# Patient Record
Sex: FEMALE | Race: BLACK OR AFRICAN AMERICAN | Employment: OTHER | RURAL
[De-identification: names, ages, dates, MRNs, and addresses within clinical notes are randomized per-mention and may not be internally consistent; named-entity substitution may affect disease eponyms.]

---

## 2017-03-15 ENCOUNTER — OFFICE VISIT (OUTPATIENT)
Dept: FAMILY MEDICINE CLINIC | Age: 82
End: 2017-03-15

## 2017-03-15 VITALS
WEIGHT: 202 LBS | HEIGHT: 65 IN | OXYGEN SATURATION: 95 % | HEART RATE: 66 BPM | RESPIRATION RATE: 18 BRPM | DIASTOLIC BLOOD PRESSURE: 60 MMHG | SYSTOLIC BLOOD PRESSURE: 137 MMHG | BODY MASS INDEX: 33.66 KG/M2 | TEMPERATURE: 96.7 F

## 2017-03-15 DIAGNOSIS — Z00.00 ROUTINE GENERAL MEDICAL EXAMINATION AT A HEALTH CARE FACILITY: Primary | ICD-10-CM

## 2017-03-15 DIAGNOSIS — Z86.73 HISTORY OF STROKE: ICD-10-CM

## 2017-03-15 DIAGNOSIS — I63.319 CEREBROVASCULAR ACCIDENT (CVA) DUE TO THROMBOSIS OF MIDDLE CEREBRAL ARTERY, UNSPECIFIED BLOOD VESSEL LATERALITY (HCC): ICD-10-CM

## 2017-03-15 DIAGNOSIS — Z13.39 SCREENING FOR ALCOHOLISM: ICD-10-CM

## 2017-03-15 DIAGNOSIS — N18.2 CKD (CHRONIC KIDNEY DISEASE), STAGE 2 (MILD): ICD-10-CM

## 2017-03-15 DIAGNOSIS — I10 ESSENTIAL HYPERTENSION: ICD-10-CM

## 2017-03-15 DIAGNOSIS — Z23 ENCOUNTER FOR IMMUNIZATION: ICD-10-CM

## 2017-03-15 DIAGNOSIS — Z13.31 SCREENING FOR DEPRESSION: ICD-10-CM

## 2017-03-15 DIAGNOSIS — E78.00 PURE HYPERCHOLESTEROLEMIA: ICD-10-CM

## 2017-03-15 DIAGNOSIS — K21.9 GASTROESOPHAGEAL REFLUX DISEASE WITHOUT ESOPHAGITIS: ICD-10-CM

## 2017-03-15 DIAGNOSIS — N32.81 OVERACTIVE BLADDER: ICD-10-CM

## 2017-03-15 RX ORDER — ATORVASTATIN CALCIUM 10 MG/1
TABLET, FILM COATED ORAL
Qty: 90 TAB | Refills: 3 | Status: SHIPPED | OUTPATIENT
Start: 2017-03-15 | End: 2018-05-05 | Stop reason: SDUPTHER

## 2017-03-15 RX ORDER — PHENOL/SODIUM PHENOLATE
20 AEROSOL, SPRAY (ML) MUCOUS MEMBRANE DAILY
Qty: 90 TAB | Refills: 3 | Status: SHIPPED | OUTPATIENT
Start: 2017-03-15 | End: 2017-06-01 | Stop reason: SDUPTHER

## 2017-03-15 RX ORDER — OXYBUTYNIN CHLORIDE 5 MG/1
TABLET ORAL
Qty: 180 TAB | Refills: 3 | Status: SHIPPED | OUTPATIENT
Start: 2017-03-15 | End: 2018-03-31 | Stop reason: SDUPTHER

## 2017-03-15 NOTE — PROGRESS NOTES
Corinne Poling is a 80 y.o. female presenting for/with: Annual Wellness Visit    HPI:  F/U TIA/CVA  Remains on ASA 81mg every day, eliquis 5 BID, judd well, no bleeding problems, BP pills and statin, judd all well. Orthostasis sx have been quiescent since last visit. No syncope, no falls, no vision change during episodes. CTA 10/2015 showed minimal atherosclerosis. No facial droop, no change in hand clumsiness (R hand a little weaker than left after last stroke)    Hypertension. Blood pressures have been great since last visit. Management at last visit included con't current meds, lisinopril@ 30mg every day due to orthostasis sx/ dizziness on the 40mg. Current regimen: ACE inhibitor and calcium channel blocker. Symptoms include no symptoms. Patient denies chest pain, palpitations, headache. Lab review:   Lab Results   Component Value Date/Time    Sodium 144 12/07/2016 02:05 PM    Potassium 4.2 12/07/2016 02:05 PM    Chloride 104 12/07/2016 02:05 PM    CO2 27 12/07/2016 02:05 PM    Anion gap 5 10/24/2015 03:21 AM    Glucose 106 12/07/2016 02:05 PM    BUN 15 12/07/2016 02:05 PM    Creatinine 1.07 12/07/2016 02:05 PM    BUN/Creatinine ratio 14 12/07/2016 02:05 PM    GFR est AA 55 12/07/2016 02:05 PM    GFR est non-AA 48 12/07/2016 02:05 PM    Calcium 9.3 12/07/2016 02:05 PM     Hyperlipidemia. On lipitor 40. Judd well. No myalgias, arthralgias, unusual weakness. Lab Results   Component Value Date/Time    Cholesterol, total 123 05/04/2016 02:32 PM    HDL Cholesterol 81 05/04/2016 02:32 PM    LDL, calculated 16 05/04/2016 02:32 PM    VLDL, calculated 26 05/04/2016 02:32 PM    Triglyceride 131 05/04/2016 02:32 PM    CHOL/HDL Ratio 1.4 10/23/2015 03:06 AM     Lab Results   Component Value Date/Time    ALT (SGPT) 8 05/04/2016 02:32 PM    AST (SGOT) 15 05/04/2016 02:32 PM    Alk.  phosphatase 85 05/04/2016 02:32 PM    Bilirubin, total 0.3 05/04/2016 02:32 PM     PMH, SH, Medications/Allergies: reviewed, on chart.    ROS:  Constitutional: No fever, chills or weight loss  Respiratory: No cough, SOB   CV: No chest pain or Palpitations    Visit Vitals    /60 (BP 1 Location: Right arm, BP Patient Position: Sitting)    Pulse 66    Temp 96.7 °F (35.9 °C) (Oral)    Resp 18    Ht 5' 5\" (1.651 m)    Wt 202 lb (91.6 kg)    SpO2 95%    BMI 33.61 kg/m2     Wt Readings from Last 3 Encounters:   03/15/17 202 lb (91.6 kg)   12/07/16 203 lb (92.1 kg)   11/02/16 202 lb (91.6 kg)     BP Readings from Last 3 Encounters:   03/15/17 137/60   12/07/16 152/78   11/02/16 130/60     Physical Examination: General appearance - alert, well appearing, and in no distress  Mental status - alert, oriented to person, place, and time  Eyes - pupils equal and reactive, extraocular eye movements intact  ENT - bilateral external ears and nose normal. Normal lips  Neck - supple, no significant adenopathy, no thyromegaly or mass  Lymphatics - no palpable lymphadenopathy, no hepatosplenomegaly  Chest - clear to auscultation, no wheezes, rales or rhonchi, symmetric air entry  Heart - normal rate, regular rhythm, normal S1, S2, no murmurs, rubs, clicks or gallops  Abd - NABS. Soft. Mild epigastric ttp, no G/R/G, no HSM/Mass. Extremities - peripheral pulses normal, no pedal edema, no clubbing or cyanosis  Neuro- CN 2-12 intact to test, nl coordination and ROM UE and LE. Nl gait. Str UE and LE 5/5. Gait normal.  Celia hallpike negative bilat. A/P:  TIA   Doing well. Con't eliquis, statin, ASA 81mg. Since had breakthru TIA on ASA and plavix, plan con't ASA and eliquis together indefinitely, provided pt has no bleeding problems. HTN and CKD  In goal today. Con't lisinopril 30mg every day, norvasc at half of a 10mg  (5mg dose) every day. Check labs in [place]. HLD  well controlled. con't current tx.  Plan labs in [place].    F/U 3mo/PRN    ______________________________________________________________________    Tucker Eddy is a 80 y.o. female and presents for annual Medicare Wellness Visit. Problem List: Reviewed with patient and discussed risk factors. Patient Active Problem List   Diagnosis Code    Carotid stenosis I65.29    CKD (chronic kidney disease) N18.9    GERD (gastroesophageal reflux disease) K21.9    Hyperlipidemia E78.5    HTN (hypertension) I10    History of stroke Z86.73    Knee pain, right M25.569    TIA (transient ischemic attack) G45.9       Current medical providers:  Patient Care Team:  Nona Ortiz MD as PCP - General (Family Practice)    PMH, , Medications/Allergies: reviewed, on chart. Female Alcohol Screening: On any occasion during the past 3 months, have you had more than 3 drinks containing alcohol? No    Do you average more than 7 drinks per week? No    ROS:  Constitutional: No fever, chills or weight loss  Respiratory: No cough, SOB   CV: No chest pain or Palpitations    Objective:  Visit Vitals    /60 (BP 1 Location: Right arm, BP Patient Position: Sitting)    Pulse 66    Temp 96.7 °F (35.9 °C) (Oral)    Resp 18    Ht 5' 5\" (1.651 m)    Wt 202 lb (91.6 kg)    SpO2 95%    BMI 33.61 kg/m2    Body mass index is 33.61 kg/(m^2). Assessment of cognitive impairment: Alert and oriented x 3    Depression Screen:   PHQ 2 / 9, over the last two weeks 3/15/2017   Little interest or pleasure in doing things Not at all   Feeling down, depressed or hopeless Not at all   Total Score PHQ 2 0       Fall Risk Assessment:    Fall Risk Assessment, last 12 mths 3/15/2017   Able to walk? Yes   Fall in past 12 months? No     Functional Ability:   Does the patient exhibit a steady gait? yes   How long did it take the patient to get up and walk from a sitting position? 3s   Is the patient self reliant?  (ie can do own laundry, meals, household chores)  yes     Does the patient handle his/her own medications? yes     Does the patient handle his/her own money?    yes     Is the patients home safe (ie good lighting, handrails on stairs and bath, etc.)? yes     Did you notice or did patient express any hearing difficulties? no     Did you notice or did patient express any vision difficulties? no       Advance Care Planning:   Patient was offered the opportunity to discuss advance care planning:  yes     Does patient have an Advance Directive:  yes   If no, did you provide information on Caring Connections? NA       Plan:      Orders Placed This Encounter    Depression Screen Annual    atorvastatin (LIPITOR) 10 mg tablet    Omeprazole delayed release (PRILOSEC D/R) 20 mg tablet    oxybutynin (DITROPAN) 5 mg tablet    varicella zoster vacine live (ZOSTAVAX) 19,400 unit/0.65 mL susr injection       Health Maintenance   Topic Date Due    DTaP/Tdap/Td series (1 - Tdap) 11/30/1954    ZOSTER VACCINE AGE 60>  11/30/1993    GLAUCOMA SCREENING Q2Y  11/30/1998    OSTEOPOROSIS SCREENING (DEXA)  11/30/1998    MEDICARE YEARLY EXAM  01/07/2017    INFLUENZA AGE 9 TO ADULT  09/11/2017 (Originally 8/1/2016)    Pneumococcal 65+ Low/Medium Risk (2 of 2 - PPSV23) 05/04/2017    BREAST CANCER SCRN MAMMOGRAM  01/13/2018       *Patient verbalized understanding and agreement with the plan. A copy of the After Visit Summary with personalized health plan was given to the patient today.

## 2017-03-15 NOTE — MR AVS SNAPSHOT
Visit Information Date & Time Provider Department Dept. Phone Encounter #  
 3/15/2017  1:00 PM Bharti Wiley MD [address] [place] [address] [id number] Follow-up Instructions Return in about 3 months (around 6/15/2017). Follow-up and Disposition History Your Appointments 6/1/2017  1:20 PM  
ESTABLISHED PATIENT with Bharti Wiley MD  
[address] ([facility]) Appt Note: 3 MO F/U  
 [address] [facility] [address] [facility],5Th Floor [address] [phone]  
  
   
 [address] [facility] [address] [place], [place] [address] [facility],5Th Floor [address] Upcoming Health Maintenance Date Due DTaP/Tdap/Td series (1 - Tdap) 11/30/1954 ZOSTER VACCINE AGE 60> 11/30/1993 GLAUCOMA SCREENING Q2Y 11/30/1998 OSTEOPOROSIS SCREENING (DEXA) 11/30/1998 MEDICARE YEARLY EXAM 1/7/2017 INFLUENZA AGE 9 TO ADULT 9/11/2017* Pneumococcal 65+ Low/Medium Risk (2 of 2 - PPSV23) 5/4/2017 BREAST CANCER SCRN MAMMOGRAM 1/13/2018 *Topic was postponed. The date shown is not the original due date. Allergies as of 3/15/2017  Review Complete On: 3/15/2017 By: Kenia Crockett LPN Severity Noted Reaction Type Reactions Ciprofloxacin  09/04/2013    Rash Current Immunizations  Never Reviewed Name Date Influenza Vaccine 10/13/2015 Pneumococcal Conjugate (PCV-13) 5/4/2016 Not reviewed this visit You Were Diagnosed With   
  
 Codes Comments Routine general medical examination at a health care facility    -  Primary ICD-10-CM: Z00.00 ICD-9-CM: V70.0 Screening for alcoholism     ICD-10-CM: Z13.89 ICD-9-CM: V79.1 Screening for depression     ICD-10-CM: Z13.89 ICD-9-CM: V79.0 History of stroke     ICD-10-CM: Z86.73 
ICD-9-CM: V12.54 Essential hypertension     ICD-10-CM: I10 
ICD-9-CM: 401.9 CKD (chronic kidney disease), stage 2 (mild)     ICD-10-CM: N18.2 ICD-9-CM: 876. 2  Pure hypercholesterolemia     ICD-10-CM: E78.00 
 ICD-9-CM: 272.0 Cerebrovascular accident (CVA) due to thrombosis of middle cerebral artery, unspecified blood vessel laterality ([facility] [address]     ICD-10-CM: P63.341 ICD-9-CM: 434.01 Gastroesophageal reflux disease without esophagitis     ICD-10-CM: K21.9 ICD-9-CM: 530.81 Overactive bladder     ICD-10-CM: N32.81 ICD-9-CM: 596.51 Encounter for immunization     ICD-10-CM: B44 ICD-9-CM: V03.89 Vitals BP Pulse Temp Resp Height(growth percentile) Weight(growth percentile)  
 137/60 (BP 1 Location: Right arm, BP Patient Position: Sitting) 66 96.7 °F (35.9 °C) (Oral) 18 5' 5\" (1.651 m) 202 lb (91.6 kg) SpO2 BMI OB Status Smoking Status 95% 33.61 kg/m2 Menopause Never Smoker BMI and BSA Data Body Mass Index Body Surface Area  
 33.61 kg/m 2 2.05 m 2 Preferred Pharmacy Pharmacy Name [facility], [place] - [address] [phone] Your Updated Medication List  
  
   
This list is accurate as of: 3/15/17  1:29 PM.  Always use your most recent med list. amLODIPine 10 mg tablet Commonly known as:  Bronson Presser Take 0.5 Tabs by mouth daily. aspirin delayed-release 81 mg tablet Take  by mouth daily. atorvastatin 10 mg tablet Commonly known as:  LIPITOR  
TAKE ONE TABLET BY MOUTH ONCE DAILY FOR stroke and cholesterol ELIQUIS 5 mg tablet Generic drug:  apixaban TAKE ONE TABLET BY MOUTH TWICE DAILY  
  
 lisinopril 30 mg tablet Commonly known as:  Ashley Pares Take 1 Tab by mouth daily. Indications: pressure Omeprazole delayed release 20 mg tablet Commonly known as:  PRILOSEC D/R Take 1 Tab by mouth daily. Indications: HEARTBURN  
  
 oxybutynin 5 mg tablet Commonly known as:  DITROPAN  
TAKE ONE TABLET BY MOUTH TWICE DAILY FOR  BLADDER  HYPERACTIVITY  
  
 varicella zoster vacine live 19,400 unit/0.65 mL Susr injection Commonly known as:  ZOSTAVAX 1 Vial by SubCUTAneous route once for 1 dose. VITAMIN D3 1,000 unit Cap Generic drug:  cholecalciferol Take  by mouth. Prescriptions Printed Refills  
 varicella zoster vacine live (ZOSTAVAX) 19,400 unit/0.65 mL susr injection 0 Si Vial by SubCUTAneous route once for 1 dose. Class: Print Route: SubCUTAneous Prescriptions Sent to Pharmacy Refills  
 atorvastatin (LIPITOR) 10 mg tablet 3 Sig: TAKE ONE TABLET BY MOUTH ONCE DAILY FOR stroke and cholesterol Class: Normal  
 Pharmacy: [address] [facility] Ph #: [phone] Omeprazole delayed release (PRILOSEC D/R) 20 mg tablet 3 Sig: Take 1 Tab by mouth daily. Indications: HEARTBURN Class: Normal  
 Pharmacy: [id number] [facility] [facility] Ph #: [phone] Route: Oral  
 oxybutynin (DITROPAN) 5 mg tablet 3 Sig: TAKE ONE TABLET BY MOUTH TWICE DAILY FOR  BLADDER  HYPERACTIVITY Class: Normal  
 Pharmacy: [id number] [facility] [facility] Ph #: [phone] We Performed the Following Binta Melendez [[id number] [facility]] Follow-up Instructions Return in about 3 months (around 6/15/2017). Introducing [facility] HE[facility]! Augustus Santos introduces Expreem patient portal. Now you can access parts of your medical record, email your doctor's office, and request medication refills online. 1. In your internet browser, go to [email or web address] 2. Click on the First Time User? Click Here link in the Sign In box. You will see the New Member Sign Up page. 3. Enter your Expreem Access Code exactly as it appears below. You will not need to use this code after youve completed the sign-up process. If you do not sign up before the expiration date, you must request a new code.  
 
· Expreem Access Code: [email or web address] 
 Expires: 6/13/2017 12:30 PM 
 
4. Enter the last four digits of your Social Security Number (xxxx) and Date of Birth (mm/dd/yyyy) as indicated and click Submit. You will be taken to the next sign-up page. 5. Create a [employer] ID. This will be your Neitui login ID and cannot be changed, so think of one that is secure and easy to remember. 6. Create a Neitui password. You can change your password at any time. 7. Enter your Password Reset Question and Answer. This can be used at a later time if you forget your password. 8. Enter your e-mail address. You will receive e-mail notification when new information is available in [address] 9. Click Sign Up. You can now view and download portions of your medical record. 10. Click the Download Summary menu link to download a portable copy of your medical information. If you have questions, please visit the Frequently Asked Questions section of the Neitui website. Remember, Neitui is NOT to be used for urgent needs. For medical emergencies, dial 911. Now available from your iPhone and Android! Please provide this summary of care documentation to your next provider. Your primary care clinician is listed as Nicolette Moroe. If you have any questions after today's visit, please call [phone].

## 2017-06-01 ENCOUNTER — OFFICE VISIT (OUTPATIENT)
Dept: FAMILY MEDICINE CLINIC | Age: 82
End: 2017-06-01

## 2017-06-01 VITALS
RESPIRATION RATE: 18 BRPM | BODY MASS INDEX: 33.49 KG/M2 | SYSTOLIC BLOOD PRESSURE: 134 MMHG | OXYGEN SATURATION: 96 % | HEIGHT: 65 IN | WEIGHT: 201 LBS | HEART RATE: 74 BPM | DIASTOLIC BLOOD PRESSURE: 59 MMHG | TEMPERATURE: 99 F

## 2017-06-01 DIAGNOSIS — K21.9 GASTROESOPHAGEAL REFLUX DISEASE WITHOUT ESOPHAGITIS: ICD-10-CM

## 2017-06-01 DIAGNOSIS — E78.00 PURE HYPERCHOLESTEROLEMIA: ICD-10-CM

## 2017-06-01 DIAGNOSIS — Z86.73 HISTORY OF STROKE: Primary | ICD-10-CM

## 2017-06-01 DIAGNOSIS — I10 ESSENTIAL HYPERTENSION: ICD-10-CM

## 2017-06-01 DIAGNOSIS — Z78.0 POSTMENOPAUSAL: ICD-10-CM

## 2017-06-01 DIAGNOSIS — Z23 ENCOUNTER FOR IMMUNIZATION: ICD-10-CM

## 2017-06-01 RX ORDER — PHENOL/SODIUM PHENOLATE
20 AEROSOL, SPRAY (ML) MUCOUS MEMBRANE DAILY
Qty: 90 TAB | Refills: 3 | Status: SHIPPED | OUTPATIENT
Start: 2017-06-01 | End: 2018-06-08 | Stop reason: SDUPTHER

## 2017-06-01 NOTE — PATIENT INSTRUCTIONS
Pneumococcal Polysaccharide Vaccine: Care Instructions  Your Care Instructions  The pneumococcal polysaccharide vaccine (PPSV) can prevent some of the serious complications of pneumonia. This includes infection in the bloodstream (bacteremia) or throughout the body (septicemia). PPSV is recommended for people ages 72 years and older. People ages 3 to 59 who have a long-term illness should also get the vaccine. This includes people with diabetes, heart disease, liver disease, or lung disease. PPSV can also help people who have a weakened immune system. This includes cancer patients and people who don't have a spleen. The immune system helps your body fight infection and other illnesses. PPSV is given as a shot. It's usually given in the arm. Healthy older adults get the shot once. Other people may need to have a second dose. The shot may cause pain and redness at the site. It may also cause a mild fever for a short time. Follow-up care is a key part of your treatment and safety. Be sure to make and go to all appointments, and call your doctor if you are having problems. It's also a good idea to know your test results and keep a list of the medicines you take. How can you care for yourself at home? · Take an over-the-counter pain medicine, such as acetaminophen (Tylenol), ibuprofen (Advil, Motrin), or naproxen (Aleve), if your arm is sore after the shot. Be safe with medicines. Read and follow all instructions on the label. · Give acetaminophen (Tylenol) or ibuprofen (Advil, Motrin) to your child for pain or fussiness after the shot. Read and follow all instructions on the label. Do not give aspirin to anyone younger than 20. It has been linked to Reye syndrome, a serious illness. · Put ice or a cold pack on the sore area for 10 to 20 minutes at a time. Put a thin cloth between the ice and your skin. When should you call for help? Call 911 anytime you think you may need emergency care.  For example, call if:  · You have severe problems breathing or swallowing. · You have a seizure. Call your doctor now or seek immediate medical care if:  · You get hives. · You have a high fever. · You have any unusual reaction after getting the shot. Watch closely for changes in your health, and be sure to contact your doctor if you have any problems. Where can you learn more? Go to http://naina-david.info/. Enter T225 in the search box to learn more about \"Pneumococcal Polysaccharide Vaccine: Care Instructions. \"  Current as of: February 9, 2016  Content Version: 11.2  © 6232-6590 [employer]. Care instructions adapted under license by [employer] (which disclaims liability or warranty for this information). If you have questions about a medical condition or this instruction, always ask your healthcare professional. [address] any warranty or liability for your use of this information. If you have any questions regarding [employer], you may call [employer] support at [phone].

## 2017-06-01 NOTE — PROGRESS NOTES
Dvein Downs is a 80 y.o. female presenting for/with:    Medication Refill    HPI:  F/U TIA/CVA  Remains on ASA 81mg every day, eliquis 5 BID, judd well, no bleeding problems, BP pills and statin, judd all well. Orthostasis sx have been quiescent since last visit. No syncope, no falls, no vision change during episodes. CTA 10/2015 showed minimal atherosclerosis. No facial droop, no change in hand clumsiness (R hand a little weaker than left after last stroke)    Hypertension. Blood pressures have been great since last visit. Management at last visit included con't current meds, lisinopril@ 30mg every day due to orthostasis sx/ dizziness on the 40mg. Current regimen: ACE inhibitor and calcium channel blocker. Symptoms include no symptoms. Patient denies chest pain, palpitations, headache. Lab review:   Lab Results   Component Value Date/Time    Sodium 144 12/07/2016 02:05 PM    Potassium 4.2 12/07/2016 02:05 PM    Chloride 104 12/07/2016 02:05 PM    CO2 27 12/07/2016 02:05 PM    Anion gap 5 10/24/2015 03:21 AM    Glucose 106 12/07/2016 02:05 PM    BUN 15 12/07/2016 02:05 PM    Creatinine 1.07 12/07/2016 02:05 PM    BUN/Creatinine ratio 14 12/07/2016 02:05 PM    GFR est AA 55 12/07/2016 02:05 PM    GFR est non-AA 48 12/07/2016 02:05 PM    Calcium 9.3 12/07/2016 02:05 PM     Hyperlipidemia. On lipitor 40. Judd well. No myalgias, arthralgias, unusual weakness. Lab Results   Component Value Date/Time    Cholesterol, total 123 05/04/2016 02:32 PM    HDL Cholesterol 81 05/04/2016 02:32 PM    LDL, calculated 16 05/04/2016 02:32 PM    VLDL, calculated 26 05/04/2016 02:32 PM    Triglyceride 131 05/04/2016 02:32 PM    CHOL/HDL Ratio 1.4 10/23/2015 03:06 AM     Lab Results   Component Value Date/Time    ALT (SGPT) 8 05/04/2016 02:32 PM    AST (SGOT) 15 05/04/2016 02:32 PM    Alk.  phosphatase 85 05/04/2016 02:32 PM    Bilirubin, total 0.3 05/04/2016 02:32 PM     PMH, SH, Medications/Allergies: reviewed, on chart.    ROS:  Constitutional: No fever, chills or weight loss  Respiratory: No cough, SOB   CV: No chest pain or Palpitations    Visit Vitals    /59    Pulse 74    Temp 99 °F (37.2 °C) (Oral)    Resp 18    Ht 5' 5\" (1.651 m)    Wt 201 lb (91.2 kg)    SpO2 96%    BMI 33.45 kg/m2     Wt Readings from Last 3 Encounters:   06/01/17 201 lb (91.2 kg)   03/15/17 202 lb (91.6 kg)   12/07/16 203 lb (92.1 kg)     BP Readings from Last 3 Encounters:   06/01/17 134/59   03/15/17 137/60   12/07/16 152/78     Physical Examination: General appearance - alert, well appearing, and in no distress  Mental status - alert, oriented to person, place, and time  Eyes - pupils equal and reactive, extraocular eye movements intact  ENT - bilateral external ears and nose normal. Normal lips  Neck - supple, no significant adenopathy, no thyromegaly or mass  Lymphatics - no palpable lymphadenopathy, no hepatosplenomegaly  Chest - clear to auscultation, no wheezes, rales or rhonchi, symmetric air entry  Heart - normal rate, regular rhythm, normal S1, S2, no murmurs, rubs, clicks or gallops  Extremities - peripheral pulses normal, no pedal edema, no clubbing or cyanosis    A/P:  TIA   Doing well. Con't eliquis, statin, ASA 81mg. Since had breakthru TIA on ASA and plavix, plan con't ASA and eliquis together indefinitely, provided pt has no bleeding problems. HTN and CKD  In goal today. Con't lisinopril 30mg every day, norvasc at half of a 10mg  (5mg dose) every day. Check labs    HLD  well controlled. con't current tx. Check labs    HCM  DXA due  Ordered. Pneumovax 23 due. Ordered.     F/U 3mo/PRN

## 2017-06-01 NOTE — MR AVS SNAPSHOT
Visit Information Date & Time Provider Department Dept. Phone Encounter #  
 6/1/2017  1:20 PM Dante Singer MD [address] [place] [address] [id number] Follow-up Instructions Return in about 6 months (around 12/1/2017). Your Appointments 12/7/2017  1:20 PM  
ESTABLISHED PATIENT with Dante Singer MD  
[facility] ([facility]) Appt Note: 6 MO F/U  
 1000 [facility] [address] [facility] [id number] [phone]  
  
   
 1000 [address] [facility] [address] Upcoming Health Maintenance Date Due  
 GLAUCOMA SCREENING Q2Y 11/30/1998 Pneumococcal 65+ Low/Medium Risk (2 of 2 - PPSV23) 5/4/2017 INFLUENZA AGE 9 TO ADULT 9/11/2017* BREAST CANCER SCRN MAMMOGRAM 1/13/2018 MEDICARE YEARLY EXAM 3/16/2018 DTaP/Tdap/Td series (2 - Td) 6/1/2027 *Topic was postponed. The date shown is not the original due date. Allergies as of 6/1/2017  Review Complete On: 6/1/2017 By: Dante Singer MD  
  
 Severity Noted Reaction Type Reactions Ciprofloxacin  09/04/2013    Rash Current Immunizations  Never Reviewed Name Date Influenza Vaccine 10/13/2015 Pneumococcal Conjugate (PCV-13) 5/4/2016 Pneumococcal Polysaccharide (PPSV-23) 6/1/2017 Not reviewed this visit You Were Diagnosed With   
  
 Codes Comments History of stroke    -  Primary ICD-10-CM: Z86.73 
ICD-9-CM: V12.54 Essential hypertension     ICD-10-CM: I10 
ICD-9-CM: 401.9 Pure hypercholesterolemia     ICD-10-CM: E78.00 ICD-9-CM: 272.0 Gastroesophageal reflux disease without esophagitis     ICD-10-CM: K21.9 ICD-9-CM: 530.81 Postmenopausal     ICD-10-CM: Z78.0 ICD-9-CM: V49.81 Encounter for immunization     ICD-10-CM: T12 ICD-9-CM: V03.89 Vitals BP Pulse Temp Resp Height(growth percentile) Weight(growth percentile) 134/59 74 99 °F (37.2 °C) (Oral) 18 5' 5\" (1.651 m) 201 lb (91.2 kg) SpO2 BMI OB Status Smoking Status 96% 33.45 kg/m2 Menopause Never Smoker BMI and BSA Data Body Mass Index Body Surface Area  
 33.45 kg/m 2 2.05 m 2 Preferred Pharmacy Pharmacy Name Phone [address] [facility] [place] [facility], [place] - [address] [phone] Your Updated Medication List  
  
   
This list is accurate as of: 6/1/17  1:58 PM.  Always use your most recent med list. amLODIPine 10 mg tablet Commonly known as:  Leslie Punter Take 0.5 Tabs by mouth daily. aspirin delayed-release 81 mg tablet Take  by mouth daily. atorvastatin 10 mg tablet Commonly known as:  LIPITOR  
TAKE ONE TABLET BY MOUTH ONCE DAILY FOR stroke and cholesterol ELIQUIS 5 mg tablet Generic drug:  apixaban TAKE ONE TABLET BY MOUTH TWICE DAILY  
  
 lisinopril 30 mg tablet Commonly known as:  Roxianne Daughters Take 1 Tab by mouth daily. Indications: pressure Omeprazole delayed release 20 mg tablet Commonly known as:  PRILOSEC D/R Take 1 Tab by mouth daily. Indications: HEARTBURN  
  
 oxybutynin 5 mg tablet Commonly known as:  DITROPAN  
TAKE ONE TABLET BY MOUTH TWICE DAILY FOR  BLADDER  HYPERACTIVITY  
  
 VITAMIN D3 1,000 unit Cap Generic drug:  cholecalciferol Take  by mouth. Prescriptions Printed Refills Omeprazole delayed release (PRILOSEC D/R) 20 mg tablet 3 Sig: Take 1 Tab by mouth daily. Indications: HEARTBURN Class: Print Route: Oral  
  
We Performed the Following ADMIN PNEUMOCOCCAL VACCINE [ [facility]S] LIPID PANEL [12858 CPT(R)] METABOLIC PANEL, COMPREHENSIVE [42265 CPT(R)] PNEUMOCOCCAL POLYSACCHARIDE VACCINE, 23-VALENT, ADULT OR IMMUNOSUPPRESSED PT DOSE, [66538 CPT(R)] Follow-up Instructions Return in about 6 months (around 12/1/2017). To-Do List   
 06/01/2017 Imaging:  DEXA BONE DENSITY STUDY AXIAL Patient Instructions Pneumococcal Polysaccharide Vaccine: Care Instructions Your Care Instructions The pneumococcal polysaccharide vaccine (PPSV) can prevent some of the serious complications of pneumonia. This includes infection in the bloodstream (bacteremia) or throughout the body (septicemia). PPSV is recommended for people ages 72 years and older. People ages 3 to 59 who have a long-term illness should also get the vaccine. This includes people with diabetes, heart disease, liver disease, or lung disease. PPSV can also help people who have a weakened immune system. This includes cancer patients and people who don't have a spleen. The immune system helps your body fight infection and other illnesses. PPSV is given as a shot. It's usually given in the arm. Healthy older adults get the shot once. Other people may need to have a second dose. The shot may cause pain and redness at the site. It may also cause a mild fever for a short time. Follow-up care is a key part of your treatment and safety. Be sure to make and go to all appointments, and call your doctor if you are having problems. It's also a good idea to know your test results and keep a list of the medicines you take. How can you care for yourself at home? · Take an over-the-counter pain medicine, such as acetaminophen (Tylenol), ibuprofen (Advil, Motrin), or naproxen (Aleve), if your arm is sore after the shot. Be safe with medicines. Read and follow all instructions on the label. · Give acetaminophen (Tylenol) or ibuprofen (Advil, Motrin) to your child for pain or fussiness after the shot. Read and follow all instructions on the label. Do not give aspirin to anyone younger than 20. It has been linked to Reye syndrome, a serious illness. · Put ice or a cold pack on the sore area for 10 to 20 minutes at a time. Put a thin cloth between the ice and your skin. When should you call for help? Call 911 anytime you think you may need emergency care. For example, call if: · You have severe problems breathing or swallowing. · You have a seizure. Call your doctor now or seek immediate medical care if: 
· You get hives. · You have a high fever. · You have any unusual reaction after getting the shot. Watch closely for changes in your health, and be sure to contact your doctor if you have any problems. Where can you learn more? Go to http://naina-david.info/. Enter T225 in the search box to learn more about \"Pneumococcal Polysaccharide Vaccine: Care Instructions. \" Current as of: February 9, 2016 Content Version: 11.2 © 7285-7165 [employer]. Care instructions adapted under license by [employer] (which disclaims liability or warranty for this information). If you have questions about a medical condition or this instruction, always ask your healthcare professional. [address] any warranty or liability for your use of this information. If you have any questions regarding [employer], you may call Gro[employer] support at [phone]. Introducing [facility] & HE[facility]! [facility] introduces [employer] patient portal. Now you can access parts of your medical record, email your doctor's office, and request medication refills online. 1. In your internet browser, go to [email or web address] 2. Click on the First Time User? Click Here link in the Sign In box. You will see the New Member Sign Up page. 3. Enter your [employer] Access Code exactly as it appears below. You will not need to use this code after youve completed the sign-up process. If you do not sign up before the expiration date, you must request a new code. · [employer] Access Code: [email or web address] Expires: 6/13/2017 12:30 PM 
 
4. Enter the last four digits of your Social Security Number (xxxx) and Date of Birth (mm/dd/yyyy) as indicated and click Submit. You will be taken to the next sign-up page. 5. Create a [employer] ID. This will be your [employer] login ID and cannot be changed, so think of one that is secure and easy to remember. 6. Create a [employer] password. You can change your password at any time. 7. Enter your Password Reset Question and Answer. This can be used at a later time if you forget your password. 8. Enter your e-mail address. You will receive e-mail notification when new information is available in [address] 9. Click Sign Up. You can now view and download portions of your medical record. 10. Click the Download Summary menu link to download a portable copy of your medical information. If you have questions, please visit the Frequently Asked Questions section of the [employer] website. Remember, [employer] is NOT to be used for urgent needs. For medical emergencies, dial 911. Now available from your iPhone and Android! Please provide this summary of care documentation to your next provider. Your primary care clinician is listed as Dayna Moore. If you have any questions after today's visit, please call [phone].

## 2017-06-02 LAB
ALBUMIN SERPL-MCNC: 3.5 G/DL (ref 3.5–4.7)
ALBUMIN/GLOB SERPL: 1.3 {RATIO} (ref 1.2–2.2)
ALP SERPL-CCNC: 88 IU/L (ref 39–117)
ALT SERPL-CCNC: 10 IU/L (ref 0–32)
AST SERPL-CCNC: 12 IU/L (ref 0–40)
BILIRUB SERPL-MCNC: 0.3 MG/DL (ref 0–1.2)
BUN SERPL-MCNC: 14 MG/DL (ref 8–27)
BUN/CREAT SERPL: 12 (ref 12–28)
CALCIUM SERPL-MCNC: 8.9 MG/DL (ref 8.7–10.3)
CHLORIDE SERPL-SCNC: 102 MMOL/L (ref 96–106)
CHOLEST SERPL-MCNC: 116 MG/DL (ref 100–199)
CO2 SERPL-SCNC: 24 MMOL/L (ref 18–29)
CREAT SERPL-MCNC: 1.16 MG/DL (ref 0.57–1)
GLOBULIN SER CALC-MCNC: 2.8 G/DL (ref 1.5–4.5)
GLUCOSE SERPL-MCNC: 160 MG/DL (ref 65–99)
HDLC SERPL-MCNC: 76 MG/DL
LDLC SERPL CALC-MCNC: 21 MG/DL (ref 0–99)
POTASSIUM SERPL-SCNC: 4 MMOL/L (ref 3.5–5.2)
PROT SERPL-MCNC: 6.3 G/DL (ref 6–8.5)
SODIUM SERPL-SCNC: 141 MMOL/L (ref 134–144)
TRIGL SERPL-MCNC: 94 MG/DL (ref 0–149)
VLDLC SERPL CALC-MCNC: 19 MG/DL (ref 5–40)

## 2017-12-07 ENCOUNTER — OFFICE VISIT (OUTPATIENT)
Dept: FAMILY MEDICINE CLINIC | Age: 82
End: 2017-12-07

## 2017-12-07 VITALS
RESPIRATION RATE: 12 BRPM | SYSTOLIC BLOOD PRESSURE: 130 MMHG | DIASTOLIC BLOOD PRESSURE: 80 MMHG | OXYGEN SATURATION: 97 % | WEIGHT: 199.4 LBS | TEMPERATURE: 97.6 F | HEIGHT: 65 IN | BODY MASS INDEX: 33.22 KG/M2 | HEART RATE: 66 BPM

## 2017-12-07 DIAGNOSIS — I10 ESSENTIAL HYPERTENSION: ICD-10-CM

## 2017-12-07 DIAGNOSIS — E78.00 PURE HYPERCHOLESTEROLEMIA: ICD-10-CM

## 2017-12-07 DIAGNOSIS — I65.23 BILATERAL CAROTID ARTERY STENOSIS: ICD-10-CM

## 2017-12-07 DIAGNOSIS — G45.1 HEMISPHERIC CAROTID ARTERY SYNDROME: Primary | ICD-10-CM

## 2017-12-07 DIAGNOSIS — N18.2 STAGE 2 CHRONIC KIDNEY DISEASE: ICD-10-CM

## 2017-12-07 RX ORDER — AMLODIPINE BESYLATE 5 MG/1
5 TABLET ORAL DAILY
Qty: 90 TAB | Refills: 3 | Status: SHIPPED | OUTPATIENT
Start: 2017-12-07 | End: 2019-01-31 | Stop reason: SDUPTHER

## 2017-12-07 NOTE — MR AVS SNAPSHOT
Visit Information Date & Time Provider Department Dept. Phone Encounter #  
 12/7/2017  1:20 PM Aneta Michelle MD [address] [place] [address] [id number] Follow-up Instructions Return in about 6 months (around 6/7/2018). Follow-up and Disposition History Your Appointments 6/8/2018  1:20 PM  
ESTABLISHED PATIENT with Aneta Michelle MD  
[facility]) Appt Note: 6 mo f/u  
 1000 [facility] [address] [facility],5[address] [address] [phone]  
  
   
 1000 [address] [facility] [address] Upcoming Health Maintenance Date Due  
 GLAUCOMA SCREENING Q2Y 5/4/2017 BREAST CANCER SCRN MAMMOGRAM 1/13/2018 MEDICARE YEARLY EXAM 3/16/2018 DTaP/Tdap/Td series (2 - Td) 6/1/2027 Allergies as of 12/7/2017  Review Complete On: 12/7/2017 By: Aneta Michelle MD  
  
 Severity Noted Reaction Type Reactions Ciprofloxacin  09/04/2013    Rash Current Immunizations  Never Reviewed Name Date Influenza Vaccine 10/13/2015 Pneumococcal Conjugate (PCV-13) 5/4/2016 Pneumococcal Polysaccharide (PPSV-23) 6/1/2017 Not reviewed this visit You Were Diagnosed With   
  
 Codes Comments Hemispheric carotid artery syndrome    -  Primary ICD-10-CM: G45.1 ICD-9-CM: 435.8 Essential hypertension     ICD-10-CM: I10 
ICD-9-CM: 401.9 Pure hypercholesterolemia     ICD-10-CM: E78.00 ICD-9-CM: 272.0 Stage 2 chronic kidney disease     ICD-10-CM: N18.2 ICD-9-CM: 806. 2 Bilateral carotid artery stenosis     ICD-10-CM: I65.23 ICD-9-CM: 433.10, 433.30 Vitals BP Pulse Temp Resp Height(growth percentile) Weight(growth percentile) 130/80 (BP 1 Location: Left arm, BP Patient Position: Sitting) 66 97.6 °F (36.4 °C) (Oral) 12 5' 5\" (1.651 m) 199 lb 6.4 oz (90.4 kg) SpO2 BMI OB Status Smoking Status 97% 33.18 kg/m2 Menopause Never Smoker BMI and BSA Data Body Mass Index Body Surface Area  
 33.18 kg/m 2 2.04 m 2 Preferred Pharmacy Pharmacy Name Phone [facility] [place] - [address] [phone] Your Updated Medication List  
  
   
This list is accurate as of: 12/7/17  2:27 PM.  Always use your most recent med list. amLODIPine 5 mg tablet Commonly known as:  Marli Darting Take 1 Tab by mouth daily. aspirin delayed-release 81 mg tablet Take  by mouth daily. atorvastatin 10 mg tablet Commonly known as:  LIPITOR  
TAKE ONE TABLET BY MOUTH ONCE DAILY FOR stroke and cholesterol ELIQUIS 5 mg tablet Generic drug:  apixaban TAKE ONE TABLET BY MOUTH TWICE DAILY  
  
 lisinopril 30 mg tablet Commonly known as:  PRINIVIL, ZESTRIL  
TAKE ONE TABLET BY MOUTH ONCE DAILY FOR  PRESSURE Omeprazole delayed release 20 mg tablet Commonly known as:  PRILOSEC D/R Take 1 Tab by mouth daily. Indications: HEARTBURN  
  
 oxybutynin 5 mg tablet Commonly known as:  DITROPAN  
TAKE ONE TABLET BY MOUTH TWICE DAILY FOR  BLADDER  HYPERACTIVITY  
  
 VITAMIN D3 1,000 unit Cap Generic drug:  cholecalciferol Take  by mouth. Prescriptions Sent to Pharmacy Refills  
 amLODIPine (NORVASC) 5 mg tablet 3 Sig: Take 1 Tab by mouth daily. Class: Normal  
 Pharmacy: [address] [facility] [address]  #: [phone] Route: Oral  
  
Follow-up Instructions Return in about 6 months (around 6/7/2018). Patient Instructions Keep up the good work! If you have any questions regarding [employer], you may call [employer] support at [phone]. Patient Instructions History Introducing [facility]! [employer] introduces [employer] patient portal. Now you can access parts of your medical record, email your doctor's office, and request medication refills online.    
 
1. In your internet browser, go to [email or web address] 2. Click on the First Time User? Click Here link in the Sign In box. You will see the New Member Sign Up page. 3. Enter your [employer] Access Code exactly as it appears below. You will not need to use this code after youve completed the sign-up process. If you do not sign up before the expiration date, you must request a new code. · [employer] Access Code: [email or web address] Expires: 3/7/2018  1:20 PM 
 
4. Enter the last four digits of your Social Security Number (xxxx) and Date of Birth (mm/dd/yyyy) as indicated and click Submit. You will be taken to the next sign-up page. 5. Create a [employer]t ID. This will be your [employer] login ID and cannot be changed, so think of one that is secure and easy to remember. 6. Create a [employer] password. You can change your password at any time. 7. Enter your Password Reset Question and Answer. This can be used at a later time if you forget your password. 8. Enter your e-mail address. You will receive e-mail notification when new information is available in [address] 9. Click Sign Up. You can now view and download portions of your medical record. 10. Click the Download Summary menu link to download a portable copy of your medical information. If you have questions, please visit the Frequently Asked Questions section of the [employer] website. Remember, [employer] is NOT to be used for urgent needs. For medical emergencies, dial 911. Now available from your iPhone and Android! Please provide this summary of care documentation to your next provider. Your primary care clinician is listed as Prabhjot Moore. If you have any questions after today's visit, please call [phone].

## 2017-12-07 NOTE — PROGRESS NOTES
Giovany Baker is a 80 y.o. female presenting for/with:    HPI:  F/U TIA/CVA  Remains on ASA 81mg every day, eliquis 5 BID, judd well, no bleeding problems, BP pills and statin, judd all well. Orthostasis sx have been quiescent since last visit. No syncope, no falls, no vision change during episodes. CTA 10/2015 showed minimal atherosclerosis. No facial droop, no change in hand clumsiness (R hand a little weaker than left after last stroke). Hypertension. Blood pressures have been great since last visit. Management at last visit included con't current meds, lisinopril@ 30mg every day. Current regimen: ACE inhibitor and calcium channel blocker. Symptoms include no symptoms. Patient denies chest pain, palpitations, headache. Lab review:   Lab Results   Component Value Date/Time    Sodium 141 06/01/2017 01:50 PM    Potassium 4.0 06/01/2017 01:50 PM    Chloride 102 06/01/2017 01:50 PM    CO2 24 06/01/2017 01:50 PM    Anion gap 5 10/24/2015 03:21 AM    Glucose 160 06/01/2017 01:50 PM    BUN 14 06/01/2017 01:50 PM    Creatinine 1.16 06/01/2017 01:50 PM    BUN/Creatinine ratio 12 06/01/2017 01:50 PM    GFR est AA 50 06/01/2017 01:50 PM    GFR est non-AA 44 06/01/2017 01:50 PM    Calcium 8.9 06/01/2017 01:50 PM     Hyperlipidemia. On lipitor 40. Judd well. No myalgias, arthralgias, unusual weakness. Lab Results   Component Value Date/Time    Cholesterol, total 116 06/01/2017 01:50 PM    HDL Cholesterol 76 06/01/2017 01:50 PM    LDL, calculated 21 06/01/2017 01:50 PM    VLDL, calculated 19 06/01/2017 01:50 PM    Triglyceride 94 06/01/2017 01:50 PM    CHOL/HDL Ratio 1.4 10/23/2015 03:06 AM     Lab Results   Component Value Date/Time    ALT (SGPT) 10 06/01/2017 01:50 PM    AST (SGOT) 12 06/01/2017 01:50 PM    Alk. phosphatase 88 06/01/2017 01:50 PM    Bilirubin, total 0.3 06/01/2017 01:50 PM     PMH, SH, Medications/Allergies: reviewed, on chart.     ROS:  Constitutional: No fever, chills or weight loss  Respiratory: No cough, SOB   CV: No chest pain or Palpitations    Visit Vitals    /80 (BP 1 Location: Left arm, BP Patient Position: Sitting)    Pulse 66    Temp 97.6 °F (36.4 °C) (Oral)    Resp 12    Ht 5' 5\" (1.651 m)    Wt 199 lb 6.4 oz (90.4 kg)    SpO2 97%    BMI 33.18 kg/m2     Wt Readings from Last 3 Encounters:   12/07/17 199 lb 6.4 oz (90.4 kg)   06/01/17 201 lb (91.2 kg)   03/15/17 202 lb (91.6 kg)     BP Readings from Last 3 Encounters:   12/07/17 130/80   06/01/17 134/59   03/15/17 137/60     Physical Examination: General appearance - alert, well appearing, and in no distress  Mental status - alert, oriented to person, place, and time  Eyes - pupils equal and reactive, extraocular eye movements intact  ENT - bilateral external ears and nose normal. Normal lips  Neck - supple, no significant adenopathy, no thyromegaly or mass  Lymphatics - no palpable lymphadenopathy, no hepatosplenomegaly  Chest - clear to auscultation, no wheezes, rales or rhonchi, symmetric air entry  Heart - normal rate, regular rhythm, normal S1, S2, no murmurs, rubs, clicks or gallops  Extremities - peripheral pulses normal, no pedal edema, no clubbing or cyanosis    A/P:  TIA/HLD/Carotid stenosis  Doing well. Con't eliquis, statin, ASA 81mg. Since had breakthru TIA on ASA and plavix, plan con't ASA and eliquis together indefinitely, provided pt has no bleeding problems. HTN and CKD  In goal today. Con't lisinopril 30mg every day, norvasc at 5mg every day. Hyperglycemia  Plan repeat A1c with next labs summer 2018.   F/U 6mo/PRN

## 2017-12-07 NOTE — PATIENT INSTRUCTIONS
Keep up the good work! If you have any questions regarding [employer], you may call [employer] support at [phone].

## 2018-02-06 ENCOUNTER — TELEPHONE (OUTPATIENT)
Dept: FAMILY MEDICINE CLINIC | Age: 83
End: 2018-02-06

## 2018-02-06 NOTE — TELEPHONE ENCOUNTER
Patient is needing a letter from pcp in order to have her mail box moved close to her house. Patient had a stroke and it not able to cross road to retreive her mail. Needs to be in a safer location. Please call when letter is ready.

## 2018-03-31 DIAGNOSIS — N32.81 OVERACTIVE BLADDER: ICD-10-CM

## 2018-04-02 RX ORDER — OXYBUTYNIN CHLORIDE 5 MG/1
TABLET ORAL
Qty: 60 TAB | Refills: 11 | Status: SHIPPED | OUTPATIENT
Start: 2018-04-02 | End: 2018-12-26 | Stop reason: ALTCHOICE

## 2018-04-19 ENCOUNTER — TELEPHONE (OUTPATIENT)
Dept: FAMILY MEDICINE CLINIC | Age: 83
End: 2018-04-19

## 2018-04-19 DIAGNOSIS — I10 ESSENTIAL HYPERTENSION: Primary | ICD-10-CM

## 2018-04-19 DIAGNOSIS — I10 ESSENTIAL HYPERTENSION WITH GOAL BLOOD PRESSURE LESS THAN 140/90: ICD-10-CM

## 2018-04-19 RX ORDER — LISINOPRIL 30 MG/1
30 TABLET ORAL DAILY
Qty: 90 TAB | Refills: 3 | Status: SHIPPED | OUTPATIENT
Start: 2018-04-19 | End: 2019-04-16 | Stop reason: ALTCHOICE

## 2018-05-05 DIAGNOSIS — I63.319 CEREBROVASCULAR ACCIDENT (CVA) DUE TO THROMBOSIS OF MIDDLE CEREBRAL ARTERY, UNSPECIFIED BLOOD VESSEL LATERALITY (HCC): ICD-10-CM

## 2018-05-08 RX ORDER — ATORVASTATIN CALCIUM 10 MG/1
TABLET, FILM COATED ORAL
Qty: 90 TAB | Refills: 3 | Status: SHIPPED | OUTPATIENT
Start: 2018-05-08 | End: 2019-04-16 | Stop reason: SDUPTHER

## 2018-06-08 ENCOUNTER — OFFICE VISIT (OUTPATIENT)
Dept: FAMILY MEDICINE CLINIC | Age: 83
End: 2018-06-08

## 2018-06-08 VITALS
TEMPERATURE: 99.1 F | HEIGHT: 66 IN | HEART RATE: 69 BPM | OXYGEN SATURATION: 90 % | SYSTOLIC BLOOD PRESSURE: 126 MMHG | DIASTOLIC BLOOD PRESSURE: 68 MMHG | RESPIRATION RATE: 14 BRPM | WEIGHT: 199 LBS | BODY MASS INDEX: 31.98 KG/M2

## 2018-06-08 DIAGNOSIS — R73.9 HYPERGLYCEMIA: ICD-10-CM

## 2018-06-08 DIAGNOSIS — Z86.73 HISTORY OF STROKE: ICD-10-CM

## 2018-06-08 DIAGNOSIS — Z13.31 SCREENING FOR DEPRESSION: ICD-10-CM

## 2018-06-08 DIAGNOSIS — I65.23 BILATERAL CAROTID ARTERY STENOSIS: ICD-10-CM

## 2018-06-08 DIAGNOSIS — K21.9 GASTROESOPHAGEAL REFLUX DISEASE WITHOUT ESOPHAGITIS: ICD-10-CM

## 2018-06-08 DIAGNOSIS — N18.2 STAGE 2 CHRONIC KIDNEY DISEASE: ICD-10-CM

## 2018-06-08 DIAGNOSIS — Z00.00 MEDICARE ANNUAL WELLNESS VISIT, SUBSEQUENT: Primary | ICD-10-CM

## 2018-06-08 DIAGNOSIS — E78.00 PURE HYPERCHOLESTEROLEMIA: ICD-10-CM

## 2018-06-08 DIAGNOSIS — I10 ESSENTIAL HYPERTENSION: ICD-10-CM

## 2018-06-08 DIAGNOSIS — Z13.39 SCREENING FOR ALCOHOLISM: ICD-10-CM

## 2018-06-08 DIAGNOSIS — Z13.5 SCREENING FOR GLAUCOMA: ICD-10-CM

## 2018-06-08 RX ORDER — PHENOL/SODIUM PHENOLATE
20 AEROSOL, SPRAY (ML) MUCOUS MEMBRANE DAILY
Qty: 90 TAB | Refills: 3 | Status: SHIPPED | OUTPATIENT
Start: 2018-06-08 | End: 2021-03-08 | Stop reason: SDUPTHER

## 2018-06-08 NOTE — PROGRESS NOTES
______________________________________________________________________    Whitney Scott is a 80 y.o. female and presents for annual Medicare Wellness Visit. Problem List: Reviewed with patient and discussed risk factors. Patient Active Problem List   Diagnosis Code    Carotid stenosis I65.29    CKD (chronic kidney disease) N18.9    GERD (gastroesophageal reflux disease) K21.9    Hyperlipidemia E78.5    HTN (hypertension) I10    History of stroke Z86.73    Knee pain, right M25.569    TIA (transient ischemic attack) G45.9       1. Have you been to the ER, urgent care clinic since your last visit? Hospitalized since your last visit? No    2. Have you seen or consulted any other health care providers outside of the [address] [place] [address] since your last visit? Include any pap smears or colon screening. No    Current medical providers:  Patient Care Team:  Rafa Looney MD as PCP - General (Family Practice)    [facility], , Medications/Allergies: reviewed, on chart. Female Alcohol Screening: On any occasion during the past 3 months, have you had more than 3 drinks at one sitting containing alcohol? No    Do you average more than 7 drinks per week? No      ROS:  Constitutional: No fever, chills or weight loss  Respiratory: No cough, SOB   CV: No chest pain or Palpitations    Objective:  Visit Vitals    /68 (BP 1 Location: Right arm, BP Patient Position: Sitting)    Pulse 69    Temp 99.1 °F (37.3 °C) (Oral)    Resp 14    Ht 5' 6\" (1.676 m)    Wt 199 lb (90.3 kg)    SpO2 90%    BMI 32.12 kg/m2    Body mass index is 32.12 kg/(m^2).     Assessment of cognitive impairment: Alert and oriented x 3   Mini-co Clock, 3/3 recall    Depression Screen:   PHQ over the last two weeks 2018   PHQ Not Done -   Little interest or pleasure in doing things Not at all   Feeling down, depressed or hopeless Not at all   Total Score PHQ 2 0       Fall Risk Assessment:    Fall Risk Assessment, last 12 mths 6/8/2018   Able to walk? Yes   Fall in past 12 months? No   Fall with injury? -   Number of falls in past 12 months -       Functional Ability:   Does the patient exhibit a steady gait? yes   How long did it take the patient to get up and walk from a sitting position? 1   Is the patient self reliant?  (ie can do own laundry, meals, household chores)  yes     Does the patient handle his/her own medications? yes     Does the patient handle his/her own money? yes     Is the patients home safe (ie good lighting, handrails on stairs and bath, etc.)? yes     Did you notice or did patient express any hearing difficulties? no     Did you notice or did patient express any vision difficulties? no       Advance Care Planning:   Patient was offered the opportunity to discuss advance care planning:  yes     Does patient have an Advance Directive:  yes   If no, did you provide information on Caring Connections? yes       Plan:      Orders Placed This Encounter    Depression Screen Annual    METABOLIC PANEL, COMPREHENSIVE    LIPID PANEL    HEMOGLOBIN A1C WITH EAG    Annual  Alcohol Screen 15 min ()       Health Maintenance   Topic Date Due    GLAUCOMA SCREENING Q2Y  05/04/2017    MEDICARE YEARLY EXAM  03/16/2018    Influenza Age 9 to Adult  08/01/2018    BREAST CANCER SCRN MAMMOGRAM  03/31/2019    DTaP/Tdap/Td series (2 - Td) 06/01/2027    Bone Densitometry (Dexa) Screening  Addressed    ZOSTER VACCINE AGE 60>  Addressed    Pneumococcal 65+ Low/Medium Risk  Completed       *Patient verbalized understanding and agreement with the plan. A copy of the After Visit Summary with personalized health plan was given to the patient today.

## 2018-06-08 NOTE — PATIENT INSTRUCTIONS
Medicare Wellness Visit, Female    The best way to live healthy is to have a lifestyle where you eat a well-balanced diet, exercise regularly, limit alcohol use, and quit all forms of tobacco/nicotine, if applicable. Regular preventive services are another way to keep healthy. Preventive services (vaccines, screening tests, monitoring & exams) can help personalize your care plan, which helps you manage your own care. Screening tests can find health problems at the earliest stages, when they are easiest to treat. [facility] follows the current, evidence-based guidelines published by the [facility] Are[facility] ([facility]) when recommending preventive services for our patients. Because we follow these guidelines, sometimes recommendations change over time as research supports it. (For example, mammograms used to be recommended annually. Even though Medicare will still pay for an annual mammogram, the newer guidelines recommend a mammogram every two years for women of average risk.)    Of course, you and your provider may decide to screen more often for some diseases, based on your risk and co-morbidities (chronic disease you are already diagnosed with). Preventive services for you include:    - Medicare offers their members a free annual wellness visit, which is time for you and your primary care provider to discuss and plan for your preventive service needs. Take advantage of this benefit every year!    -All people over age 72 should receive the recommended pneumonia vaccines. Current USPSTF guidelines recommend a series of two vaccines for the best pneumonia protection.     -All adults should have a yearly flu vaccine and a tetanus vaccine every 10 years. All adults age 61 years should receive a shingles vaccine once in their lifetime.      -A bone mass density test is recommended when a woman turns 65 to screen for osteoporosis.  This test is only recommended once as a screening. Some providers will use this same test as a disease monitoring tool if you already have osteoporosis. -All adults age 38-68 years who are overweight should have a diabetes screening test once every three years.     -Other screening tests & preventive services for persons with diabetes include: an eye exam to screen for diabetic retinopathy, a kidney function test, a foot exam, and stricter control over your cholesterol.     -Cardiovascular screening for adults with routine risk involves an electrocardiogram (ECG) at intervals determined by the provider.     -Colorectal cancer screenings should be done for adults age 54-65 years with normal risk. There are a number of acceptable methods of screening for this type of cancer. Each test has its own benefits and drawbacks. Discuss with your provider what is most appropriate for you during your annual wellness visit. The different tests include: colonoscopy (considered the best screening method), a fecal occult blood test, a fecal DNA test, and sigmoidoscopy. -Breast cancer screenings are recommended every other year for women of normal risk age 54-69 years.     -Cervical cancer screenings for women over age 72 are only recommended with certain risk factors.     -All adults born between [facility] should be screened once for Hepatitis C.      Here is a list of your current Health Maintenance items (your personalized list of preventive services) with a due date:  Health Maintenance Due   Topic Date Due    Glaucoma Screening   05/04/2017    Annual Well Visit  03/16/2018

## 2018-06-08 NOTE — PROGRESS NOTES
Patrick Virgen is a 80 y.o. female presenting for/with:    HPI:  F/U TIA/CVA  Remains on ASA 81mg every day, eliquis 5 BID, judd well, no bleeding problems, BP pills and statin, judd all well. Orthostasis sx have been quiescent since last visit. No syncope, no falls, no vision change lately. CTA 10/2015 showed minimal atherosclerosis. No facial droop, no change in hand clumsiness (R hand a little weaker than left after last stroke). Hypertension. Blood pressures have been great since last visit. Management at last visit included con't current meds, lisinopril@ 30mg every day. Current regimen: ACE inhibitor and calcium channel blocker. Symptoms include no symptoms. Patient denies chest pain, palpitations, headache. Lab review:   Lab Results   Component Value Date/Time    Sodium 144 01/09/2018 10:45 PM    Potassium 4.0 01/09/2018 10:45 PM    Chloride 107 01/09/2018 10:45 PM    CO2 30 01/09/2018 10:45 PM    Anion gap 7 01/09/2018 10:45 PM    Glucose 143 (H) 01/09/2018 10:45 PM    BUN 19 (H) 01/09/2018 10:45 PM    Creatinine 1.29 (H) 01/09/2018 10:45 PM    BUN/Creatinine ratio 15 01/09/2018 10:45 PM    GFR est AA 48 (L) 01/09/2018 10:45 PM    GFR est non-AA 39 (L) 01/09/2018 10:45 PM    Calcium 9.1 01/09/2018 10:45 PM     Hyperlipidemia. On lipitor 40. Judd well. No myalgias, arthralgias, unusual weakness. Lab Results   Component Value Date/Time    Cholesterol, total 116 06/01/2017 01:50 PM    HDL Cholesterol 76 06/01/2017 01:50 PM    LDL, calculated 21 06/01/2017 01:50 PM    VLDL, calculated 19 06/01/2017 01:50 PM    Triglyceride 94 06/01/2017 01:50 PM    CHOL/HDL Ratio 1.4 10/23/2015 03:06 AM     Lab Results   Component Value Date/Time    ALT (SGPT) 10 06/01/2017 01:50 PM    AST (SGOT) 12 06/01/2017 01:50 PM    Alk. phosphatase 88 06/01/2017 01:50 PM    Bilirubin, total 0.3 06/01/2017 01:50 PM     PMH, SH, Medications/Allergies: reviewed, on chart.     ROS:  Constitutional: No fever, chills or weight loss  Respiratory: No cough, SOB   CV: No chest pain or Palpitations    Visit Vitals    /68 (BP 1 Location: Right arm, BP Patient Position: Sitting)    Pulse 69    Temp 99.1 °F (37.3 °C) (Oral)    Resp 14    Ht 5' 6\" (1.676 m)    Wt 199 lb (90.3 kg)    SpO2 90%    BMI 32.12 kg/m2     Wt Readings from Last 3 Encounters:   06/08/18 199 lb (90.3 kg)   01/09/18 199 lb (90.3 kg)   12/07/17 199 lb 6.4 oz (90.4 kg)     BP Readings from Last 3 Encounters:   06/08/18 126/68   01/09/18 140/62   12/07/17 130/80     Physical Examination: General appearance - alert, well appearing, and in no distress  Mental status - alert, oriented to person, place, and time  Eyes - pupils equal and reactive, extraocular eye movements intact  ENT - bilateral external ears and nose normal. Normal lips  Neck - supple, no significant adenopathy, no thyromegaly or mass  Lymphatics - no palpable lymphadenopathy, no hepatosplenomegaly  Chest - clear to auscultation, no wheezes, rales or rhonchi, symmetric air entry  Heart - normal rate, regular rhythm, normal S1, S2, no murmurs, rubs, clicks or gallops  Extremities - peripheral pulses normal, no pedal edema, no clubbing or cyanosis    A/P:  TIA/HLD/Carotid stenosis  Doing well. Con't eliquis, statin, ASA 81mg. Since had breakthru TIA on ASA and plavix, plan con't ASA and eliquis together indefinitely, provided pt has no bleeding problems. Check CMP and lipids. HTN and CKD  In goal today. Con't lisinopril 30mg every day, norvasc at 5mg every day. Check CMP. Hyperglycemia  repeat A1c    GERD and GI bleed prophy  con't prilosec, is on double anticoag.     F/U 6mo/PRN

## 2018-06-08 NOTE — MR AVS SNAPSHOT
[address] [place] [address],[address] [facility] [phone] Patient: Katherine Morris MRN: [id number] RTO:[id number] Visit Information Date & Time Provider Department Dept. Phone Encounter #  
 6/8/2018  1:20 PM Yuliet Onofre MD [address] [place] [address] [id number] Upcoming Health Maintenance Date Due  
 GLAUCOMA SCREENING Q2Y 5/4/2017 MEDICARE YEARLY EXAM 3/16/2018 Influenza Age 5 to Adult 8/1/2018 BREAST CANCER SCRN MAMMOGRAM 3/31/2019 DTaP/Tdap/Td series (2 - Td) 6/1/2027 Allergies as of 6/8/2018  Review Complete On: 6/8/2018 By: Yuliet Onofre MD  
  
 Severity Noted Reaction Type Reactions Ciprofloxacin  09/04/2013    Rash Current Immunizations  Never Reviewed Name Date Influenza Vaccine 10/13/2015 Pneumococcal Conjugate (PCV-13) 5/4/2016 Pneumococcal Polysaccharide (PPSV-23) 6/1/2017 Not reviewed this visit You Were Diagnosed With   
  
 Codes Comments Medicare annual wellness visit, subsequent    -  Primary ICD-10-CM: Z00.00 ICD-9-CM: V70.0 Screening for alcoholism     ICD-10-CM: Z13.89 ICD-9-CM: V79.1 Screening for depression     ICD-10-CM: Z13.89 ICD-9-CM: V79.0 History of stroke     ICD-10-CM: Z86.73 
ICD-9-CM: V12.54 Bilateral carotid artery stenosis     ICD-10-CM: I65.23 ICD-9-CM: 433.10, 433.30 Pure hypercholesterolemia     ICD-10-CM: E78.00 ICD-9-CM: 272.0 Stage 2 chronic kidney disease     ICD-10-CM: N18.2 ICD-9-CM: 585.2 Essential hypertension     ICD-10-CM: I10 
ICD-9-CM: 401.9 Hyperglycemia     ICD-10-CM: R73.9 ICD-9-CM: 790.29 Screening for glaucoma     ICD-10-CM: Z13.5 ICD-9-CM: V80.1 Gastroesophageal reflux disease without esophagitis     ICD-10-CM: K21.9 ICD-9-CM: 530.81 Vitals BP Pulse Temp Resp Height(growth percentile) Weight(growth percentile) 126/68 (BP 1 Location: Right arm, BP Patient Position: Sitting) 69 99.1 °F (37.3 °C) (Oral) 14 5' 6\" (1.676 m) 199 lb (90.3 kg) SpO2 BMI OB Status Smoking Status 90% 32.12 kg/m2 Menopause Never Smoker BMI and BSA Data Body Mass Index Body Surface Area  
 32.12 kg/m 2 2.05 m 2 Preferred Pharmacy Pharmacy Name Phone [address] [phone] Your Updated Medication List  
  
   
This list is accurate as of 6/8/18  1:40 PM.  Always use your most recent med list. amLODIPine 5 mg tablet Commonly known as:  Delcie Denton Take 1 Tab by mouth daily. aspirin delayed-release 81 mg tablet Take  by mouth daily. atorvastatin 10 mg tablet Commonly known as:  LIPITOR  
TAKE ONE TABLET BY MOUTH ONCE DAILY FOR CHOLESTEROL AND  STROKE  
  
 ELIQUIS 5 mg tablet Generic drug:  apixaban TAKE ONE TABLET BY MOUTH TWICE DAILY  
  
 lisinopril 30 mg tablet Commonly known as:  Mira Bucklin Take 1 Tab by mouth daily. Indications: pressure Omeprazole delayed release 20 mg tablet Commonly known as:  PRILOSEC D/R Take 1 Tab by mouth daily. Indications: Heartburn  
  
 oxybutynin 5 mg tablet Commonly known as:  DITROPAN  
TAKE ONE TABLET BY MOUTH TWICE DAILY FOR  BLADDER  HYPERACTIVITY  
  
 VITAMIN D3 1,000 unit Cap Generic drug:  cholecalciferol Take  by mouth. Prescriptions Sent to Pharmacy Refills Omeprazole delayed release (PRILOSEC D/R) 20 mg tablet 3 Sig: Take 1 Tab by mouth daily. Indications: Heartburn Class: Normal  
 Pharmacy: [facility] [address] Ph #: [phone] Route: Oral  
  
We Performed the Following [facility] [[id number] [facility]] HEMOGLOBIN A1C WITH EAG [21878 CPT(R)] LIPID PANEL [91319 CPT(R)] METABOLIC PANEL, COMPREHENSIVE [90914 CPT(R)] HI ANNUAL ALCOHOL SCREEN 15 MIN [id number] \[facility] [place] [facility]\""] REFERRAL TO OPHTHALMOLOGY [REF57 Custom] Comments:  
 Please evaluate patient for routine eye exam and glaucoma screening Referral Information Referral ID Referred By Referred To  
  
 [id number] Hunter Haque MD   
   [id number] [facility] Phone: [phone] Fax: [phone] Visits Status Start Date End Date 1 New Request 6/8/18 6/8/19 If your referral has a status of pending review or denied, additional information will be sent to support the outcome of this decision. Patient Instructions Medicare Wellness Visit, Female The best way to live healthy is to have a lifestyle where you eat a well-balanced diet, exercise regularly, limit alcohol use, and quit all forms of tobacco/nicotine, if applicable. Regular preventive services are another way to keep healthy. Preventive services (vaccines, screening tests, monitoring & exams) can help personalize your care plan, which helps you manage your own care. Screening tests can find health problems at the earliest stages, when they are easiest to treat. [facility] follows the current, evidence-based guidelines published by the [facility] ([facility]) when recommending preventive services for our patients. Because we follow these guidelines, sometimes recommendations change over time as research supports it. (For example, mammograms used to be recommended annually. Even though Medicare will still pay for an annual mammogram, the newer guidelines recommend a mammogram every two years for women of average risk.) Of course, you and your provider may decide to screen more often for some diseases, based on your risk and co-morbidities (chronic disease you are already diagnosed with). Preventive services for you include: - Medicare offers their members a free annual wellness visit, which is time for you and your primary care provider to discuss and plan for your preventive service needs. Take advantage of this benefit every year! 
 
-All people over age 72 should receive the recommended pneumonia vaccines. Current USPSTF guidelines recommend a series of two vaccines for the best pneumonia protection.  
 
-All adults should have a yearly flu vaccine and a tetanus vaccine every 10 years. All adults age 61 years should receive a shingles vaccine once in their lifetime.   
 
-A bone mass density test is recommended when a woman turns 65 to screen for osteoporosis. This test is only recommended once as a screening. Some providers will use this same test as a disease monitoring tool if you already have osteoporosis. -All adults age 38-68 years who are overweight should have a diabetes screening test once every three years.  
 
-Other screening tests & preventive services for persons with diabetes include: an eye exam to screen for diabetic retinopathy, a kidney function test, a foot exam, and stricter control over your cholesterol.  
 
-Cardiovascular screening for adults with routine risk involves an electrocardiogram (ECG) at intervals determined by the provider.  
 
-Colorectal cancer screenings should be done for adults age 54-65 years with normal risk. There are a number of acceptable methods of screening for this type of cancer. Each test has its own benefits and drawbacks. Discuss with your provider what is most appropriate for you during your annual wellness visit. The different tests include: colonoscopy (considered the best screening method), a fecal occult blood test, a fecal DNA test, and sigmoidoscopy. -Breast cancer screenings are recommended every other year for women of normal risk age 54-69 years.  
 
-Cervical cancer screenings for women over age 72 are only recommended with certain risk factors. -All adults born between [facility] should be screened once for Hepatitis C. Here is a list of your current Health Maintenance items (your personalized list of preventive services) with a due date: 
Health Maintenance Due Topic Date Due  Glaucoma Screening   05/04/2017 Marisol Loya Annual Well Visit  03/16/2018 Introducing [facility]! [employer] introduces EpicTopic patient portal. Now you can access parts of your medical record, email your doctor's office, and request medication refills online. 1. In your internet browser, go to [email or web address] 2. Click on the First Time User? Click Here link in the Sign In box. You will see the New Member Sign Up page. 3. Enter your EpicTopic Access Code exactly as it appears below. You will not need to use this code after youve completed the sign-up process. If you do not sign up before the expiration date, you must request a new code. · EpicTopic Access Code: [email or web address] Expires: 6/11/2018  2:27 PM 
 
4. Enter the last four digits of your Social Security Number (xxxx) and Date of Birth (mm/dd/yyyy) as indicated and click Submit. You will be taken to the next sign-up page. 5. Create a EpicTopic ID. This will be your EpicTopic login ID and cannot be changed, so think of one that is secure and easy to remember. 6. Create a EpicTopic password. You can change your password at any time. 7. Enter your Password Reset Question and Answer. This can be used at a later time if you forget your password. 8. Enter your e-mail address. You will receive e-mail notification when new information is available in [address] 9. Click Sign Up. You can now view and download portions of your medical record. 10. Click the Download Summary menu link to download a portable copy of your medical information. If you have questions, please visit the Frequently Asked Questions section of the EpicTopic website.  Remember, EpicTopic is NOT to be used for urgent needs. For medical emergencies, dial 911. Now available from your iPhone and Android! Please provide this summary of care documentation to your next provider. Your primary care clinician is listed as Lakesha Moore. If you have any questions after today's visit, please call [phone].

## 2018-06-09 LAB
ALBUMIN SERPL-MCNC: 3.7 G/DL (ref 3.5–4.7)
ALBUMIN/GLOB SERPL: 1.3 {RATIO} (ref 1.2–2.2)
ALP SERPL-CCNC: 91 IU/L (ref 39–117)
ALT SERPL-CCNC: 10 IU/L (ref 0–32)
AST SERPL-CCNC: 12 IU/L (ref 0–40)
BILIRUB SERPL-MCNC: 0.3 MG/DL (ref 0–1.2)
BUN SERPL-MCNC: 19 MG/DL (ref 8–27)
BUN/CREAT SERPL: 18 (ref 12–28)
CALCIUM SERPL-MCNC: 9.4 MG/DL (ref 8.7–10.3)
CHLORIDE SERPL-SCNC: 104 MMOL/L (ref 96–106)
CHOLEST SERPL-MCNC: 124 MG/DL (ref 100–199)
CO2 SERPL-SCNC: 25 MMOL/L (ref 18–29)
CREAT SERPL-MCNC: 1.03 MG/DL (ref 0.57–1)
EST. AVERAGE GLUCOSE BLD GHB EST-MCNC: 137 MG/DL
GFR SERPLBLD CREATININE-BSD FMLA CKD-EPI: 50 ML/MIN/1.73
GFR SERPLBLD CREATININE-BSD FMLA CKD-EPI: 58 ML/MIN/1.73
GLOBULIN SER CALC-MCNC: 2.9 G/DL (ref 1.5–4.5)
GLUCOSE SERPL-MCNC: 112 MG/DL (ref 65–99)
HBA1C MFR BLD: 6.4 % (ref 4.8–5.6)
HDLC SERPL-MCNC: 79 MG/DL
LDLC SERPL CALC-MCNC: 33 MG/DL (ref 0–99)
POTASSIUM SERPL-SCNC: 4.4 MMOL/L (ref 3.5–5.2)
PROT SERPL-MCNC: 6.6 G/DL (ref 6–8.5)
SODIUM SERPL-SCNC: 142 MMOL/L (ref 134–144)
TRIGL SERPL-MCNC: 61 MG/DL (ref 0–149)
VLDLC SERPL CALC-MCNC: 12 MG/DL (ref 5–40)

## 2018-11-28 DIAGNOSIS — I63.30 CEREBROVASCULAR ACCIDENT (CVA) DUE TO THROMBOSIS OF CEREBRAL ARTERY (HCC): ICD-10-CM

## 2018-11-29 RX ORDER — APIXABAN 5 MG/1
TABLET, FILM COATED ORAL
Qty: 60 TAB | Refills: 11 | Status: SHIPPED | OUTPATIENT
Start: 2018-11-29 | End: 2018-11-29 | Stop reason: SDUPTHER

## 2018-12-26 PROBLEM — N32.81 OAB (OVERACTIVE BLADDER): Status: ACTIVE | Noted: 2018-12-26

## 2019-01-26 ENCOUNTER — HOSPITAL ENCOUNTER (INPATIENT)
Age: 84
LOS: 4 days | Discharge: HOME HEALTH | DRG: 661 | End: 2019-01-30
Attending: INTERNAL MEDICINE | Admitting: INTERNAL MEDICINE
Payer: MEDICARE

## 2019-01-26 PROBLEM — N13.30 HYDRONEPHROSIS: Status: ACTIVE | Noted: 2019-01-26

## 2019-01-26 PROCEDURE — 94760 N-INVAS EAR/PLS OXIMETRY 1: CPT

## 2019-01-26 PROCEDURE — 65270000029 HC RM PRIVATE

## 2019-01-26 PROCEDURE — 74011250636 HC RX REV CODE- 250/636: Performed by: INTERNAL MEDICINE

## 2019-01-26 PROCEDURE — 74011250637 HC RX REV CODE- 250/637: Performed by: INTERNAL MEDICINE

## 2019-01-26 RX ORDER — ATORVASTATIN CALCIUM 10 MG/1
10 TABLET, FILM COATED ORAL DAILY
Status: DISCONTINUED | OUTPATIENT
Start: 2019-01-26 | End: 2019-01-30 | Stop reason: HOSPADM

## 2019-01-26 RX ORDER — HYDRALAZINE HYDROCHLORIDE 20 MG/ML
10 INJECTION INTRAMUSCULAR; INTRAVENOUS
Status: DISCONTINUED | OUTPATIENT
Start: 2019-01-26 | End: 2019-01-30 | Stop reason: HOSPADM

## 2019-01-26 RX ORDER — HYDROCODONE BITARTRATE AND ACETAMINOPHEN 5; 325 MG/1; MG/1
1 TABLET ORAL
Status: DISCONTINUED | OUTPATIENT
Start: 2019-01-26 | End: 2019-01-30 | Stop reason: HOSPADM

## 2019-01-26 RX ORDER — MORPHINE SULFATE 2 MG/ML
1 INJECTION, SOLUTION INTRAMUSCULAR; INTRAVENOUS
Status: DISCONTINUED | OUTPATIENT
Start: 2019-01-26 | End: 2019-01-30 | Stop reason: HOSPADM

## 2019-01-26 RX ORDER — MELATONIN
1000 DAILY
Status: DISCONTINUED | OUTPATIENT
Start: 2019-01-26 | End: 2019-01-30 | Stop reason: HOSPADM

## 2019-01-26 RX ORDER — ONDANSETRON 2 MG/ML
4 INJECTION INTRAMUSCULAR; INTRAVENOUS
Status: DISCONTINUED | OUTPATIENT
Start: 2019-01-26 | End: 2019-01-30 | Stop reason: HOSPADM

## 2019-01-26 RX ORDER — SODIUM CHLORIDE 0.9 % (FLUSH) 0.9 %
5-40 SYRINGE (ML) INJECTION AS NEEDED
Status: DISCONTINUED | OUTPATIENT
Start: 2019-01-26 | End: 2019-01-30 | Stop reason: HOSPADM

## 2019-01-26 RX ORDER — OXYBUTYNIN CHLORIDE 5 MG/1
10 TABLET, EXTENDED RELEASE ORAL DAILY
Status: DISCONTINUED | OUTPATIENT
Start: 2019-01-26 | End: 2019-01-30 | Stop reason: HOSPADM

## 2019-01-26 RX ORDER — SODIUM CHLORIDE 9 MG/ML
50 INJECTION, SOLUTION INTRAVENOUS CONTINUOUS
Status: DISCONTINUED | OUTPATIENT
Start: 2019-01-26 | End: 2019-01-30

## 2019-01-26 RX ORDER — SODIUM CHLORIDE 0.9 % (FLUSH) 0.9 %
5-40 SYRINGE (ML) INJECTION EVERY 8 HOURS
Status: DISCONTINUED | OUTPATIENT
Start: 2019-01-26 | End: 2019-01-30 | Stop reason: HOSPADM

## 2019-01-26 RX ORDER — BISACODYL 5 MG
5 TABLET, DELAYED RELEASE (ENTERIC COATED) ORAL DAILY PRN
Status: DISCONTINUED | OUTPATIENT
Start: 2019-01-26 | End: 2019-01-30 | Stop reason: HOSPADM

## 2019-01-26 RX ORDER — AMLODIPINE BESYLATE 5 MG/1
5 TABLET ORAL DAILY
Status: DISCONTINUED | OUTPATIENT
Start: 2019-01-26 | End: 2019-01-30 | Stop reason: HOSPADM

## 2019-01-26 RX ORDER — PANTOPRAZOLE SODIUM 40 MG/1
40 TABLET, DELAYED RELEASE ORAL DAILY
Status: DISCONTINUED | OUTPATIENT
Start: 2019-01-26 | End: 2019-01-30 | Stop reason: HOSPADM

## 2019-01-26 RX ADMIN — ATORVASTATIN CALCIUM 10 MG: 10 TABLET, FILM COATED ORAL at 10:15

## 2019-01-26 RX ADMIN — OXYBUTYNIN CHLORIDE 10 MG: 5 TABLET, EXTENDED RELEASE ORAL at 10:14

## 2019-01-26 RX ADMIN — SODIUM CHLORIDE 100 ML/HR: 900 INJECTION, SOLUTION INTRAVENOUS at 04:10

## 2019-01-26 RX ADMIN — AMLODIPINE BESYLATE 5 MG: 5 TABLET ORAL at 10:14

## 2019-01-26 RX ADMIN — PANTOPRAZOLE SODIUM 40 MG: 40 TABLET, DELAYED RELEASE ORAL at 10:14

## 2019-01-26 RX ADMIN — VITAMIN D, TAB 1000IU (100/BT) 1000 UNITS: 25 TAB at 10:14

## 2019-01-26 NOTE — CONSULTS
[address]  MR#: [id number]  : 1933  ACCOUNT #: [de-identified]   DATE OF SERVICE: 2019    REFERRING PHYSICIAN:  Kelsie Tillman MD    REASON FOR  EVALUATION:  1. Right flank pain. 2.  Right hydronephrosis. 3.  An 8 mm mid right ureteral calculus. HISTORY OF PRESENT ILLNESS:  The patient is an 59-year-old black female who lives in [place] She presented to the hospital there with complaints of acute onset of right-sided flank pain. The pain was constant, it was very severe. She said it was sharp and stabbing. She was seen at the ER there and had a CT scan which demonstrated some right-sided hydronephrosis as well as an 8 mm mid right ureteral calculus as well as a bump in creatinine. Modifying factors that she was seen in the emergency department and given pain medications and the pain improved. She was transferred to [facility] and urology was consulted. PAST MEDICAL HISTORY:  Significant for hypertension, hyperlipidemia, carotid stenosis, does have a history of having had a TIA. She also has a history of stone disease. She has gastroesophageal reflux disease as well. PAST SURGICAL HISTORY:  Significant for a cholecystectomy as well as a hysterectomy. SOCIAL HISTORY:  Denies any smoking. Denies any alcohol. FAMILY HISTORY:  She denies any family history of stone disease, but she does have personal history. MEDICATIONS:  At the time of admission include Eliquis 5 mg 1 tablet p.o. b.i.d., oxybutynin 1 tab p.o. daily 10 mg, Lipitor 10 mg daily, Zestril 30 mg tablet, Norvasc 5 mg daily, 81 mg aspirin and vitamin D3.    REVIEW OF SYSTEMS:  Significant for severe abdominal pain, otherwise noncontributory. PHYSICAL EXAMINATION:  GENERAL:  Exam today reveals a well-developed, well-nourished female who appears younger than her stated age. She is in no distress.   VITAL SIGNS: Temperature is 97.7, blood pressure is 142/71, pulse 60, respiratory rate 18. HEENT:  Normocephalic, atraumatic. Extraocular muscles are intact. NECK:  There is no jugular venous distention. Sclerae are anicteric. CARDIOVASCULAR:  Regular rate. PULMONARY:  Clear. ABDOMEN:  Soft, nontender, nondistended. There is no CVA or point spinal tenderness. SKIN:  Warm and dry. NEUROLOGIC:  Cranial nerves II-XII are grossly intact. EXTREMITIES:  Demonstrate no clubbing, cyanosis or edema. LABORATORY VALUES:  Reviewed. Revealed a white blood cell count of 11, H and H of 13 and 41, platelets of 165. Urine did show large blood, about 0-5 red blood cells per high powered field on microscopy. Chemistries show a sodium of 140, potassium 4.2, chloride 104, bicarbonate 27, BUN 20, creatinine 1.66. Previous baseline creatinine was 1.03 in 06/2018. CT without contrast was performed. It demonstrated 8 mm mid right ureteral calculus with hydronephrosis as well as bilateral nonobstructing renal stones. IMPRESSION:  1. Right flank pain. 2.  Right hydronephrosis. 3.  Mid right ureteral calculus, 8 mm in size. PLAN:  I had discussion with the patient today. Her daughter is living in [place]. She lives alone. She is going to call her daughter and decide what she would like to do. Discussed with her that today we could do a cystoscopy and a right ureteral stent placement. I informed her that this would not treat the stone, but this would temporize the situation and help with pain and discomfort and then we will be able to treat the stone as an outpatient. She stated that she was transferred to the hospital to be able to get her stone taken care of so she wants to get her stone taken care of while she is here. The nurse called her daughter and then relayed on to me that her daughter wants to stay in the hospital until the stone can be addressed.   I explained to the patient for that we would be unable to address her stone until sometime next week. She does understand this. At this point, we will go ahead and feed her. Hold her aspirin and Eliquis and see about scheduling for a cystoscopy and a right retrograde pyelogram and a right ureteroscopic stone extraction using laser sometime next week.       MD JOSE Godoy / [place]  D: 01/26/2019 11:03     T: 01/26/2019 11:22  JOB #: [id number]

## 2019-01-26 NOTE — PROGRESS NOTES
Hospitalist Progress Note NAME: Loretta Oseguera :  1933 MRN:  [id number] Assessment / Plan: Moderate hydronephrosis secondary to right mid ureter kidney stone Acute renal failure most likely secondary to obstructive uropathy NPO, ivf and pain management CT scan which demonstrated some right-sided hydronephrosis as well as an 8 mm mid right ureteral calculus . Noted per urology eval:  
scheduling for a cystoscopy and a right retrograde pyelogram and a right ureteroscopic stone extraction using laser sometime next week. Need to talk with urology regarding the plan Hypertension Continue  PTA norvasc Hold lisinopril because of worsening renal function 
- echo 2015: ef 60% no wall motion abnormalities History of stroke/carotid stenosis Hold aspirin and Eliquis for possible surgical intervention Need to resume as able Hyperlipidemia Continue Lipitor 40 mg daily 
 
 
  
  
Code Status: Full Surrogate Decision Maker:sister  
  
DVT Prophylaxis: SCD 
GI Prophylaxis:o protonix 
  
Baseline: independent , use cane Lives in [place] Code status: full Prophylaxis: scds (eliquis on hold) Recommended Disposition: tbd Subjective: Chief Complaint / Reason for Physician Visit Follow up on moderate hydronephrosis Discussed with RN events overnight. Review of Systems: 
Symptom Y/N Comments  Symptom Y/N Comments Fever/Chills n   Chest Pain n   
Poor Appetite    Edema Cough n   Abdominal Pain n   
Sputum    Joint Pain SOB/FERNANDEZ n   Pruritis/Rash Nausea/vomit    Tolerating PT/OT Diarrhea    Tolerating Diet Constipation    Other Could NOT obtain due to:   
 
Objective: VITALS:  
Last 24hrs VS reviewed since prior progress note. Most recent are: 
Patient Vitals for the past 24 hrs: 
 Temp Pulse Resp BP SpO2  
19 0801 97.7 °F (36.5 °C) 60 18 142/71 96 % 01/26/19 0443 98.1 °F (36.7 °C) 67 18 132/62 96 % 01/26/19 0300 98.4 °F (36.9 °C) 68 18 140/67 96 % Intake/Output Summary (Last 24 hours) at 1/26/2019 0637 Last data filed at 1/26/2019 3868 Gross per 24 hour Intake 275 ml Output 850 ml Net -575 ml PHYSICAL EXAM: 
General: WD, WN. Alert, cooperative, no acute distress   
EENT:  EOMI. Anicteric sclerae. MMM Resp:  Bilateral air entry, no crackles. No accessory muscle use CV:  Regular  rhythm,  No edema GI:  Soft, Non distended, Non tender.  +Bowel sounds Neurologic:  Alert and oriented X 3, normal speech, Psych:   Good insight. Not anxious nor agitated Skin:  No rashes. No jaundice Reviewed most current lab test results and cultures  YES Reviewed most current radiology test results   YES Review and summation of old records today    NO Reviewed patient's current orders and MAR    YES 
PMH/ reviewed - no change compared to H&P 
________________________________________________________________________ Care Plan discussed with: 
  Comments Patient y Family RN y   
Care Manager Consultant Multidiciplinary team rounds were held today with , nursing, pharmacist and clinical coordinator. Patient's plan of care was discussed; medications were reviewed and discharge planning was addressed. ________________________________________________________________________ Total NON critical care TIME: 35  Minutes Total CRITICAL CARE TIME Spent:   Minutes non procedure based Comments >50% of visit spent in counseling and coordination of care    
________________________________________________________________________ Vincenzo Perry MD  
 
Procedures: see electronic medical records for all procedures/Xrays and details which were not copied into this note but were reviewed prior to creation of Plan. LABS: 
I reviewed today's most current labs and imaging studies. Pertinent labs include: 
Recent Labs  
  01/25/19 2030 WBC 11.1* HGB 12.9 HCT 40.6  Recent Labs  
  01/25/19 2030   
K 4.2  CO2 27 * BUN 20  
CREA 1.66* CA 8.9 ALB 3.4* TBILI 0.4 SGOT 15 ALT 18 Signed: Owen Jara MD

## 2019-01-26 NOTE — PROGRESS NOTES
Bedside shift change report given to Gerry Lucero [address] (oncoming nurse) by Luis Fernando Gallegos (offgoing nurse). Report included the following information SBAR, Kardex, Intake/Output, MAR and Recent Results.

## 2019-01-26 NOTE — PROGRESS NOTES
Patient seen and examined and chart reviewed. Full consult dictated. Discussed with patient undergoing right ureteral stent placement today. She called her daughters and she and the family decided that they want her to stay in the hospital until stone can be treated. Please continue to hold ASA and eliquis. Will see who is available next week to address stone.

## 2019-01-26 NOTE — PROGRESS NOTES
Spoke with pt and her daughter, Ayaz Carnes. They prefer she remain inpatient until stone can be addressed this coming week due to family living in [place] and pt having limited support in the area (she is from [address] [place] [address]). They understand date/time are unknown. Dr. Pasha Chew notified; ok for pt to eat. Cardiac regular diet entered.

## 2019-01-26 NOTE — PROGRESS NOTES
Patient received via stretcher and ambulance EMT in attendance to room 2118, nursing supervisor and MD notified of arrival. Patient assisted up to bathroom, voiding without difficulties. Resting quietly in bed at present with no acute distress, call bell in reach, bed in low locked position.

## 2019-01-26 NOTE — H&P
Hospitalist Admission NoteNAME: Belen Gamez :  1933 MRN:  [id number] Date/Time:  2019 6:07 AM 
 
Patient PCP: Cammie Price MD 
______________________________________________________________________ Given the patient's current clinical presentation, I have a high level of concern for decompensation if discharged from the emergency department. Complex decision making was performed, which includes reviewing the patient's available past medical records, laboratory results, and x-ray films. My assessment of this patient's clinical condition and my plan of care is as follows. Assessment / Plan: Moderate hydronephrosis secondary to right mid ureter kidney stone Start patient on pain medication Keep patient n.p.o. Urology consultation aware IV fluid Acute renal failure most likely secondary to obstructive uropathy Gentle hydration with IV fluid Urology consultation Hypertension Continue home antihypertensive medication Hold lisinopril because of worsening renal function History of TIA Hold aspirin and Eliquis for possible surgical intervention at a.m. Hyperlipidemia Continue Lipitor 40 mg daily History of carotid stenosis Code Status: Full Surrogate Decision Maker:sister DVT Prophylaxis: SCD 
GI Prophylaxis: not indicated Baseline: independent , use cane Subjective: CHIEF COMPLAINT: RLQ pain HISTORY OF PRESENT ILLNESS:    
years old female with past medical history significant for hypertension, GERD, kidney stone, history of CVA was transferred from [facility] because of right lower quadrant abdominal pain started today, patient denies any nausea any vomiting any fever any chills any change in color of urine any diarrhea, abdominal CT scan was done on show moderate right hydronephrosis with 8 mm stone right mid ureter, patient was transferred for urology evaluation, blood work was done in ED and show mild worsening creatinine 1.66, transfer center discussed the case with the accepting hospitalist and urology on-call and agree about the transfer. We were asked to admit for work up and evaluation of the above problems. Past Medical History:  
Diagnosis Date  Calculus of kidney  Carotid stenosis  CKD (chronic kidney disease)  GERD (gastroesophageal reflux disease)  Hiatal hernia  Hypertension  Stroke [facility]) 10/6/12 Past Surgical History:  
Procedure Laterality Date  HX CHOLECYSTECTOMY  HX HYSTERECTOMY Social History Tobacco Use  Smoking status: Never Smoker  Smokeless tobacco: Never Used Substance Use Topics  Alcohol use: No  
  Alcohol/week: 0.0 oz Frequency: Never Binge frequency: Never Family History Problem Relation Age of Onset  Heart Disease Mother  Hypertension Mother  Hypertension Father  Heart Disease Father Allergies Allergen Reactions  Ciprofloxacin Rash Prior to Admission medications Medication Sig Start Date End Date Taking? Authorizing Provider  
oxybutynin chloride XL (DITROPAN XL) 10 mg CR tablet Take 1 Tab by mouth daily. 12/26/18   Royal Baltazar MD  
apixaban (ELIQUIS) 5 mg tablet Take 1 Tab by mouth two (2) times a day. 11/29/18   Royal Baltazar MD  
Omeprazole delayed release (PRILOSEC D/R) 20 mg tablet Take 1 Tab by mouth daily. Indications: Heartburn 6/8/18   Marisabel Moore MD  
atorvastatin (LIPITOR) 10 mg tablet TAKE ONE TABLET BY MOUTH ONCE DAILY FOR CHOLESTEROL AND  STROKE 5/8/18   Marisabel Moore MD  
lisinopril (PRINIVIL, ZESTRIL) 30 mg tablet Take 1 Tab by mouth daily. Indications: pressure 4/19/18   Marisabel Moore MD  
amLODIPine (NORVASC) 5 mg tablet Take 1 Tab by mouth daily.  12/7/17   Royal Baltazar MD  
 aspirin delayed-release 81 mg tablet Take  by mouth daily. Provider, Historical  
Cholecalciferol, Vitamin D3, (VITAMIN D3) 1,000 unit cap Take  by mouth. Provider, Historical  
 
 
REVIEW OF SYSTEMS:    
I am not able to complete the review of systems because: The patient is intubated and sedated The patient has altered mental status due to his acute medical problems The patient has baseline aphasia from prior stroke(s) The patient has baseline dementia and is not reliable historian The patient is in acute medical distress and unable to provide information Total of 12 systems reviewed as follows:   
   POSITIVE= underlined text  Negative = text not underlined General:  fever, chills, sweats, generalized weakness, weight loss/gain,  
   loss of appetite Eyes:    blurred vision, eye pain, loss of vision, double vision ENT:    rhinorrhea, pharyngitis Respiratory:   cough, sputum production, SOB, FERNANDEZ, wheezing, pleuritic pain  
Cardiology:   chest pain, palpitations, orthopnea, PND, edema, syncope Gastrointestinal:  abdominal pain , N/V, diarrhea, dysphagia, constipation, bleeding Genitourinary:  frequency, urgency, dysuria, hematuria, incontinence Muskuloskeletal :  arthralgia, myalgia, back pain Hematology:  easy bruising, nose or gum bleeding, lymphadenopathy Dermatological: rash, ulceration, pruritis, color change / jaundice Endocrine:   hot flashes or polydipsia Neurological:  headache, dizziness, confusion, focal weakness, paresthesia, Speech difficulties, memory loss, gait difficulty Psychological: Feelings of anxiety, depression, agitation Objective: VITALS:   
Visit Vitals /62 (BP 1 Location: Right arm, BP Patient Position: Post activity; Sitting) Pulse 67 Temp 98.1 °F (36.7 °C) Resp 18 SpO2 96% PHYSICAL EXAM: 
 
 
_______________________________________________________________________ Care Plan discussed with: 
  Comments Patient y Family RN y   
Care Manager Consultant:     
_______________________________________________________________________ Expected  Disposition:  
Home with Family y HH/PT/OT/RN   
SNF/LTC   
TEJAS   
________________________________________________________________________ TOTAL TIME: 60 Minutes Critical Care Provided     Minutes non procedure based Comments  
 y Reviewed previous records  
>50% of visit spent in counseling and coordination of care y Discussion with patient and/or family and questions answered 
  
 
________________________________________________________________________ Signed: Oscar Sullivan MD 
 
Procedures: see electronic medical records for all procedures/Xrays and details which were not copied into this note but were reviewed prior to creation of Plan. LAB DATA REVIEWED:   
Recent Results (from the past 24 hour(s)) CBC WITH AUTOMATED DIFF Collection Time: 01/25/19  8:30 PM  
Result Value Ref Range WBC 11.1 (H) 3.6 - 11.0 K/uL RBC 4.45 3.80 - 5.20 M/uL  
 HGB 12.9 11.5 - 16.0 g/dL HCT 40.6 35.0 - 47.0 % MCV 91.2 80.0 - 99.0 FL  
 MCH 29.0 26.0 - 34.0 PG  
 MCHC 31.8 30.0 - 36.5 g/dL  
 RDW 13.7 11.5 - 14.5 % PLATELET 579 776 - 436 K/uL MPV 11.4 8.9 - 12.9 FL  
 NEUTROPHILS 82 (H) 32 - 75 % LYMPHOCYTES 10 (L) 12 - 49 % MONOCYTES 7 5 - 13 % EOSINOPHILS 1 0 - 7 % BASOPHILS 0 0 - 1 %  
 ABS. NEUTROPHILS 9.1 (H) 1.8 - 8.0 K/UL  
 ABS. LYMPHOCYTES 1.1 0.8 - 3.5 K/UL  
 ABS. MONOCYTES 0.7 0.0 - 1.0 K/UL  
 ABS. EOSINOPHILS 0.1 0.0 - 0.4 K/UL  
 ABS. BASOPHILS 0.0 0.0 - 0.1 K/UL XXWBCSUS 0    
METABOLIC PANEL, COMPREHENSIVE Collection Time: 01/25/19  8:30 PM  
Result Value Ref Range Sodium 140 136 - 145 mmol/L Potassium 4.2 3.5 - 5.1 mmol/L Chloride 104 97 - 108 mmol/L  
 CO2 27 21 - 32 mmol/L Anion gap 9 5 - 15 mmol/L Glucose 191 (H) 65 - 100 mg/dL BUN 20 6 - 20 MG/DL Creatinine 1.66 (H) 0.55 - 1.02 MG/DL  
 BUN/Creatinine ratio 12 12 - 20 GFR est AA 36 (L) >60 ml/min/1.73m2 GFR est non-AA 29 (L) >60 ml/min/1.73m2 Calcium 8.9 8.5 - 10.1 MG/DL Bilirubin, total 0.4 0.2 - 1.0 MG/DL  
 ALT (SGPT) 18 12 - 78 U/L  
 AST (SGOT) 15 15 - 37 U/L Alk. phosphatase 91 45 - 117 U/L Protein, total 7.4 6.4 - 8.2 g/dL Albumin 3.4 (L) 3.5 - 5.0 g/dL Globulin 4.0 2.0 - 4.0 g/dL A-G Ratio 0.9 (L) 1.1 - 2.2 URINALYSIS W/ RFLX MICROSCOPIC Collection Time: 01/25/19  8:30 PM  
Result Value Ref Range Color YELLOW/STRAW Appearance CLEAR CLEAR Specific gravity 1.015 1.003 - 1.030    
 pH (UA) 7.0 5.0 - 8.0 Protein TRACE (A) NEG mg/dL Glucose NEGATIVE  NEG mg/dL Ketone TRACE (A) NEG mg/dL Bilirubin NEGATIVE  NEG Blood LARGE (A) NEG Urobilinogen 1.0 0.2 - 1.0 EU/dL Nitrites NEGATIVE  NEG Leukocyte Esterase NEGATIVE  NEG    
LIPASE Collection Time: 01/25/19  8:30 PM  
Result Value Ref Range  Lipase 64 (L) 73 - 393 U/L  
 URINE MICROSCOPIC ONLY Collection Time: 01/25/19  8:30 PM  
Result Value Ref Range WBC 0-4 0 - 4 /hpf  
 RBC 0-5 0 - 5 /hpf Epithelial cells FEW FEW /lpf  Bacteria NEGATIVE  NEG /hpf

## 2019-01-27 LAB
BASOPHILS # BLD: 0 K/UL (ref 0–0.1)
BASOPHILS NFR BLD: 0 % (ref 0–1)
DIFFERENTIAL METHOD BLD: ABNORMAL
EOSINOPHIL # BLD: 0.2 K/UL (ref 0–0.4)
EOSINOPHIL NFR BLD: 4 % (ref 0–7)
ERYTHROCYTE [DISTWIDTH] IN BLOOD BY AUTOMATED COUNT: 13.4 % (ref 11.5–14.5)
HCT VFR BLD AUTO: 36.8 % (ref 35–47)
HGB BLD-MCNC: 11.3 G/DL (ref 11.5–16)
IMM GRANULOCYTES # BLD AUTO: 0 K/UL (ref 0–0.04)
IMM GRANULOCYTES NFR BLD AUTO: 0 % (ref 0–0.5)
LYMPHOCYTES # BLD: 1.1 K/UL (ref 0.8–3.5)
LYMPHOCYTES NFR BLD: 21 % (ref 12–49)
MCH RBC QN AUTO: 29.9 PG (ref 26–34)
MCHC RBC AUTO-ENTMCNC: 30.7 G/DL (ref 30–36.5)
MCV RBC AUTO: 97.4 FL (ref 80–99)
MONOCYTES # BLD: 0.4 K/UL (ref 0–1)
MONOCYTES NFR BLD: 8 % (ref 5–13)
NEUTS SEG # BLD: 3.6 K/UL (ref 1.8–8)
NEUTS SEG NFR BLD: 67 % (ref 32–75)
NRBC # BLD: 0 K/UL (ref 0–0.01)
NRBC BLD-RTO: 0 PER 100 WBC
PLATELET # BLD AUTO: 140 K/UL (ref 150–400)
PMV BLD AUTO: 11.3 FL (ref 8.9–12.9)
RBC # BLD AUTO: 3.78 M/UL (ref 3.8–5.2)
WBC # BLD AUTO: 5.4 K/UL (ref 3.6–11)

## 2019-01-27 PROCEDURE — 65270000029 HC RM PRIVATE

## 2019-01-27 PROCEDURE — 74011250636 HC RX REV CODE- 250/636: Performed by: HOSPITALIST

## 2019-01-27 PROCEDURE — 97116 GAIT TRAINING THERAPY: CPT | Performed by: PHYSICAL THERAPIST

## 2019-01-27 PROCEDURE — 36415 COLL VENOUS BLD VENIPUNCTURE: CPT

## 2019-01-27 PROCEDURE — 74011250637 HC RX REV CODE- 250/637: Performed by: INTERNAL MEDICINE

## 2019-01-27 PROCEDURE — 97161 PT EVAL LOW COMPLEX 20 MIN: CPT | Performed by: PHYSICAL THERAPIST

## 2019-01-27 PROCEDURE — 85025 COMPLETE CBC W/AUTO DIFF WBC: CPT

## 2019-01-27 PROCEDURE — 94760 N-INVAS EAR/PLS OXIMETRY 1: CPT

## 2019-01-27 RX ADMIN — PANTOPRAZOLE SODIUM 40 MG: 40 TABLET, DELAYED RELEASE ORAL at 10:18

## 2019-01-27 RX ADMIN — Medication 10 ML: at 21:02

## 2019-01-27 RX ADMIN — AMLODIPINE BESYLATE 5 MG: 5 TABLET ORAL at 10:18

## 2019-01-27 RX ADMIN — SODIUM CHLORIDE 75 ML/HR: 900 INJECTION, SOLUTION INTRAVENOUS at 05:41

## 2019-01-27 RX ADMIN — ATORVASTATIN CALCIUM 10 MG: 10 TABLET, FILM COATED ORAL at 10:18

## 2019-01-27 RX ADMIN — OXYBUTYNIN CHLORIDE 10 MG: 5 TABLET, EXTENDED RELEASE ORAL at 10:18

## 2019-01-27 RX ADMIN — VITAMIN D, TAB 1000IU (100/BT) 1000 UNITS: 25 TAB at 10:18

## 2019-01-27 RX ADMIN — Medication 10 ML: at 14:00

## 2019-01-27 NOTE — PROGRESS NOTES
General Surgery End of Shift Nursing Note Bedside shift change report given to ANALY (oncoming nurse) by Audra (offgoing nurse). Report included the following information SBAR, Kardex, Intake/Output and MAR. Shift worked:   7a-7p Summary of shift:    No c/o pain, nausea, etc. Voiding, good appetite. Issues for physician to address: none Franca Taylor

## 2019-01-27 NOTE — PROGRESS NOTES
Pt up in chair. She is alert and oriented times four at this time. Denies pain or needs. On room air, oxygen saturation normal. Call light within reach. Plan of care reviewed with her, she voiced understanding. Will monitor. No events overnight. Slept well. Will monitor.

## 2019-01-27 NOTE — PROGRESS NOTES
Problem: Falls - Risk of 
Goal: *Absence of Falls Document Chelly Beyer Fall Risk and appropriate interventions in the flowsheet. Outcome: Progressing Towards Goal 
Fall Risk Interventions: 
Mobility Interventions: Patient to call before getting OOB, PT Consult for mobility concerns, Utilize walker, cane, or other assistive device

## 2019-01-27 NOTE — PROGRESS NOTES
Hospitalist Progress Note NAME: Belen Gamez :  1933 MRN:  [id number] Assessment / Plan: Moderate hydronephrosis secondary to right mid ureter kidney stone Acute renal failure most likely secondary to obstructive uropathy diet today, and pain management CT scan which demonstrated some right-sided hydronephrosis as well as an 8 mm mid right ureteral calculus . - per the urology, npo past midnight and possible cystoscopy and right ureteroscopy with laser and right ureteral stent placement. Hypertension Continue  PTA norvasc Hold lisinopril because of worsening renal function ( labs ordered today BMP not done ) Check labs in am 
- echo 2015: ef 60% no wall motion abnormalities History of stroke/carotid stenosis Hold aspirin and Eliquis for possible surgical intervention Need to resume as able Hyperlipidemia Continue Lipitor 40 mg daily 
 
 
  
Code Status: Full Surrogate Decision Maker:sister  
  
DVT Prophylaxis: SCD 
GI Prophylaxis: protonix 
  
Baseline: independent , use cane Lives in [place] near [facility], lives by herself Met the patient 3 daughters today ( who live in [place]) PT recommended HH with increased assistance vs snf Code status: full Prophylaxis: scds (eliquis on hold) Recommended Disposition: tbd Subjective: Chief Complaint / Reason for Physician Visit Follow up on moderate hydronephrosis Sitting in chair, with family at the bedside. Discussed with RN events overnight. Review of Systems: 
Symptom Y/N Comments  Symptom Y/N Comments Fever/Chills n   Chest Pain n   
Poor Appetite    Edema Cough n   Abdominal Pain n   
Sputum    Joint Pain SOB/FERNANDEZ n   Pruritis/Rash Nausea/vomit    Tolerating PT/OT Diarrhea    Tolerating Diet Constipation    Other Could NOT obtain due to:   
 
Objective: VITALS:  
Last 24hrs VS reviewed since prior progress note. Most recent are: Patient Vitals for the past 24 hrs: 
 Temp Pulse Resp BP SpO2  
01/27/19 1209 97.9 °F (36.6 °C) 64 16 160/66 90 % 01/27/19 0848 97.9 °F (36.6 °C) 60 16 147/73 97 % 01/26/19 2331 97.4 °F (36.3 °C) (!) 53 16 139/70 98 % 01/26/19 1851 97.9 °F (36.6 °C) 67 16 142/59 100 % 01/26/19 1530 97.6 °F (36.4 °C) 63 16 116/65 95 % Intake/Output Summary (Last 24 hours) at 1/27/2019 1421 Last data filed at 1/27/2019 1353 Gross per 24 hour Intake 2424.17 ml Output 1525 ml Net 899.17 ml PHYSICAL EXAM: 
General: WD, WN. Alert, cooperative,  no acute distress   
EENT:  EOMI. Anicteric sclerae. MMM Resp:  Bilateral air entry, no crackles  No accessory muscle use CV:  Regular  rhythm,  No edema GI:  Soft, Non distended, Non tender.  +Bowel sounds Neurologic:  Alert and oriented X 3, normal speech, Psych:   Good insight. Not anxious nor agitated Skin:  No rashes. No jaundice Reviewed most current lab test results and cultures  YES Reviewed most current radiology test results   YES Review and summation of old records today    NO Reviewed patient's current orders and MAR    YES 
PMH/ reviewed - no change compared to H&P 
________________________________________________________________________ Care Plan discussed with: 
  Comments Patient y Family RN y   
Care Manager Consultant Multidiciplinary team rounds were held today with , nursing, pharmacist and clinical coordinator. Patient's plan of care was discussed; medications were reviewed and discharge planning was addressed. ________________________________________________________________________ Total NON critical care TIME: 30  Minutes Total CRITICAL CARE TIME Spent:   Minutes non procedure based Comments >50% of visit spent in counseling and coordination of care    
________________________________________________________________________ Ruth Manifold, MD  
 
 Procedures: see electronic medical records for all procedures/Xrays and details which were not copied into this note but were reviewed prior to creation of Plan. LABS: 
I reviewed today's most current labs and imaging studies. Pertinent labs include: 
Recent Labs  
  01/27/19 0707 01/25/19 2030 WBC 5.4 11.1* HGB 11.3* 12.9 HCT 36.8 40.6 * 171 Recent Labs  
  01/25/19 2030   
K 4.2  CO2 27 * BUN 20  
CREA 1.66* CA 8.9 ALB 3.4* TBILI 0.4 SGOT 15 ALT 18 Signed: Aminah Forrester MD

## 2019-01-27 NOTE — PROGRESS NOTES
Doing well, no complaints. No further pain. Afebrile. Will make NPO after midnight in case their is availability for cystoscopy and right ureteroscopy with laser and right ureteral stent placement. She knows case may not be tomorrow and may be later in the week in which case she will allowed to eat and then will make NPO when needed.

## 2019-01-27 NOTE — PROGRESS NOTES
Problem: Falls - Risk of 
Goal: *Absence of Falls Document Orvel Buffy Fall Risk and appropriate interventions in the flowsheet. Outcome: Progressing Towards Goal 
Fall Risk Interventions: 
Mobility Interventions: Patient to call before getting OOB Medication Interventions: Bed/chair exit alarm, Patient to call before getting OOB Elimination Interventions: Bed/chair exit alarm

## 2019-01-27 NOTE — PROGRESS NOTES
Problem: Mobility Impaired (Adult and Pediatric) Goal: *Acute Goals and Plan of Care (Insert Text) Physical Therapy Goals Initiated 1/27/2019 1. Patient will move from supine to sit and sit to supine  in bed with independence within 7 day(s). 2.  Patient will transfer from bed to chair and chair to bed with supervision/set-up using the least restrictive device within 7 day(s). 3.  Patient will perform sit to stand with independence within 7 day(s). 4.  Patient will ambulate with supervision/set-up for 200 feet with the least restrictive device within 7 day(s). physical Therapy EVALUATION Patient: Joshua Carrizales (80 y.o. female) Date: 1/27/2019 Primary Diagnosis: Kidney Izora [place] Hydronephrosis Precautions: fall ASSESSMENT : 
Based on the objective data described below, the patient presents with generalized weakness, impaired balance, decreased activity tolerance, and slightly decreased functional mobility skills. Patient coming out of bathroom upon arrival.  Patient agreeable to ambulation. Patient ambulated 120' with quad cane and CGA, while occasionally holding onto handrail in hallway. Patient with a slow pace, decreased step clearance and shortened steps, but no overt LOB. Patient returned to bathroom with chair brought behind to sit while awaiting assistance with a bath. Prior to admission patient was independent with mobility and lived alone in a one story home. Patient able to take care of home and did a little driving. Family present during evaluation and they express concerns of patient getting weak. Discussed discharge options of HHPT and SNF. They would like to talk to patient and  about options. PT will continue to assess but currently would recommend HHPT and increased assistance/supervision at home vs. SNF if extra assistance not available. Saray Mancia Patient will benefit from skilled intervention to address the above impairments. Patients rehabilitation potential is considered to be Good Factors which may influence rehabilitation potential include:  
[]         None noted 
[]         Mental ability/status []         Medical condition 
[x]         Home/family situation and support systems 
[]         Safety awareness 
[]         Pain tolerance/management 
[]         Other: PLAN : 
Recommendations and Planned Interventions: 
[x]           Bed Mobility Training             []    Neuromuscular Re-Education 
[x]           Transfer Training                   []    Orthotic/Prosthetic Training 
[x]           Gait Training                         []    Modalities [x]           Therapeutic Exercises           []    Edema Management/Control 
[]           Therapeutic Activities            []    Patient and Family Training/Education 
[]           Other (comment): Frequency/Duration: Patient will be followed by physical therapy  4 times a week to address goals. Discharge Recommendations: Home Health and increased assistance vs. SNF Further Equipment Recommendations for Discharge: none SUBJECTIVE:  
Patient stated I like to hold onto things when I walk.  OBJECTIVE DATA SUMMARY:  
HISTORY:   
Past Medical History:  
Diagnosis Date  Calculus of kidney  Carotid stenosis  CKD (chronic kidney disease)  GERD (gastroesophageal reflux disease)  Hiatal hernia  Hypertension  Stroke [facility]) 10/6/12 Past Surgical History:  
Procedure Laterality Date  HX CHOLECYSTECTOMY  HX HYSTERECTOMY Prior Level of Function/Home Situation: Lives alone; independent with mobility. Personal factors and/or comorbidities impacting plan of care: Assistance available near home Home Situation Home Environment: Private residence # Steps to Enter: 0 Wheelchair Ramp: Yes One/Two Story Residence: One story Living Alone: Yes Support Systems: Family member(s) Patient Expects to be Discharged to[de-identified] Private residence Current DME Used/Available at Home: Cane, straight, Walker, rolling EXAMINATION/PRESENTATION/DECISION MAKING: Critical Behavior: 
Neurologic State: Alert Orientation Level: Oriented X4 Cognition: Appropriate for age attention/concentration Safety/Judgement: Awareness of environment, Decreased awareness of need for safety Hearing: Auditory Auditory Impairment: NoneSkin:  intact Edema: none noted Range Of Motion: 
AROM: Generally decreased, functional 
  
  
  
PROM: Within functional limits Strength:   
Strength: Generally decreased, functional 
  
  
  
  
  
  
Tone & Sensation:  
Tone: Normal 
  
  
  
  
Sensation: Impaired(fingers) Coordination: 
Coordination: Within functional limits Vision:  
  
Functional Mobility: 
Bed Mobility: 
  
  
  
  
Transfers: 
Sit to Stand: Contact guard assistance Stand to Sit: Contact guard assistance Bed to Chair: Contact guard assistance Balance:  
Sitting: Intact Standing: Impaired Standing - Static: Fair Standing - Dynamic : FairAmbulation/Gait Training:Distance (ft): 120 Feet (ft) Assistive Device: Gait belt;Cane, quad Ambulation - Level of Assistance: Contact guard assistance Gait Abnormalities: Decreased step clearance Speed/Any: Pace decreased (<100 feet/min) Step Length: Right shortened;Left shortened Therapeutic Exercises:  
 
 
Functional Measure: 
 
Elder Mobility Scale 
 
11/20 Scores under 10  generally these patients are dependent in mobility maneuvers; require help with 
basic ADL, such as transfers, toileting and dressing. Scores between 10  13  generally these patients are borderline in terms of safe mobility and 
independence in ADL i.e. they require some help with some mobility maneuvers. Scores over 14  Generally these patients are able to perform mobility maneuvers alone and safely 
and are independent in basic ADL. Physical Therapy Evaluation Charge Determination History Examination Presentation Decision-Making MEDIUM  Complexity : 1-2 comorbidities / personal factors will impact the outcome/ POC  LOW Complexity : 1-2 Standardized tests and measures addressing body structure, function, activity limitation and / or participation in recreation  LOW Complexity : Stable, uncomplicated  LOW Complexity : FOTO score of  Based on the above components, the patient evaluation is determined to be of the following complexity level: LOW Pain: 
  
  
  
  
  
  
Activity Tolerance:  
fair Please refer to the flowsheet for vital signs taken during this treatment. After treatment:  
[x]         Patient left in no apparent distress sitting up in chair 
[]         Patient left in no apparent distress in bed 
[x]         Call bell left within reach [x]         Nursing notified 
[x]         Caregiver present 
[]         Bed alarm activated COMMUNICATION/EDUCATION:  
The patients plan of care was discussed with: Registered Nurse. [x]         Fall prevention education was provided and the patient/caregiver indicated understanding. []         Patient/family have participated as able in goal setting and plan of care. [x]         Patient/family agree to work toward stated goals and plan of care. []         Patient understands intent and goals of therapy, but is neutral about his/her participation. []         Patient is unable to participate in goal setting and plan of care. Thank you for this referral. 
Aide Angeles, PT Time Calculation: 16 mins

## 2019-01-28 ENCOUNTER — ANESTHESIA EVENT (OUTPATIENT)
Dept: SURGERY | Age: 84
DRG: 661 | End: 2019-01-28
Payer: MEDICARE

## 2019-01-28 LAB
ANION GAP SERPL CALC-SCNC: 5 MMOL/L (ref 5–15)
BASOPHILS # BLD: 0 K/UL (ref 0–0.1)
BASOPHILS NFR BLD: 0 % (ref 0–1)
BUN SERPL-MCNC: 19 MG/DL (ref 6–20)
BUN/CREAT SERPL: 16 (ref 12–20)
CALCIUM SERPL-MCNC: 8.6 MG/DL (ref 8.5–10.1)
CHLORIDE SERPL-SCNC: 110 MMOL/L (ref 97–108)
CO2 SERPL-SCNC: 28 MMOL/L (ref 21–32)
CREAT SERPL-MCNC: 1.22 MG/DL (ref 0.55–1.02)
DIFFERENTIAL METHOD BLD: ABNORMAL
EOSINOPHIL # BLD: 0.2 K/UL (ref 0–0.4)
EOSINOPHIL NFR BLD: 3 % (ref 0–7)
ERYTHROCYTE [DISTWIDTH] IN BLOOD BY AUTOMATED COUNT: 13.2 % (ref 11.5–14.5)
GLUCOSE SERPL-MCNC: 102 MG/DL (ref 65–100)
HCT VFR BLD AUTO: 36 % (ref 35–47)
HGB BLD-MCNC: 11.6 G/DL (ref 11.5–16)
IMM GRANULOCYTES # BLD AUTO: 0 K/UL (ref 0–0.04)
IMM GRANULOCYTES NFR BLD AUTO: 0 % (ref 0–0.5)
LYMPHOCYTES # BLD: 1.1 K/UL (ref 0.8–3.5)
LYMPHOCYTES NFR BLD: 19 % (ref 12–49)
MCH RBC QN AUTO: 30.1 PG (ref 26–34)
MCHC RBC AUTO-ENTMCNC: 32.2 G/DL (ref 30–36.5)
MCV RBC AUTO: 93.3 FL (ref 80–99)
MONOCYTES # BLD: 0.6 K/UL (ref 0–1)
MONOCYTES NFR BLD: 10 % (ref 5–13)
NEUTS SEG # BLD: 3.9 K/UL (ref 1.8–8)
NEUTS SEG NFR BLD: 68 % (ref 32–75)
NRBC # BLD: 0 K/UL (ref 0–0.01)
NRBC BLD-RTO: 0 PER 100 WBC
PLATELET # BLD AUTO: 140 K/UL (ref 150–400)
PMV BLD AUTO: 11.3 FL (ref 8.9–12.9)
POTASSIUM SERPL-SCNC: 4 MMOL/L (ref 3.5–5.1)
RBC # BLD AUTO: 3.86 M/UL (ref 3.8–5.2)
SODIUM SERPL-SCNC: 143 MMOL/L (ref 136–145)
WBC # BLD AUTO: 5.8 K/UL (ref 3.6–11)

## 2019-01-28 PROCEDURE — 85025 COMPLETE CBC W/AUTO DIFF WBC: CPT

## 2019-01-28 PROCEDURE — 36415 COLL VENOUS BLD VENIPUNCTURE: CPT

## 2019-01-28 PROCEDURE — 94760 N-INVAS EAR/PLS OXIMETRY 1: CPT

## 2019-01-28 PROCEDURE — 65270000029 HC RM PRIVATE

## 2019-01-28 PROCEDURE — 74011250637 HC RX REV CODE- 250/637: Performed by: INTERNAL MEDICINE

## 2019-01-28 PROCEDURE — 97116 GAIT TRAINING THERAPY: CPT

## 2019-01-28 PROCEDURE — 80048 BASIC METABOLIC PNL TOTAL CA: CPT

## 2019-01-28 RX ADMIN — PANTOPRAZOLE SODIUM 40 MG: 40 TABLET, DELAYED RELEASE ORAL at 08:52

## 2019-01-28 RX ADMIN — Medication 10 ML: at 05:22

## 2019-01-28 RX ADMIN — OXYBUTYNIN CHLORIDE 10 MG: 5 TABLET, EXTENDED RELEASE ORAL at 08:52

## 2019-01-28 RX ADMIN — Medication 10 ML: at 13:36

## 2019-01-28 RX ADMIN — ATORVASTATIN CALCIUM 10 MG: 10 TABLET, FILM COATED ORAL at 08:52

## 2019-01-28 RX ADMIN — VITAMIN D, TAB 1000IU (100/BT) 1000 UNITS: 25 TAB at 08:52

## 2019-01-28 RX ADMIN — AMLODIPINE BESYLATE 5 MG: 5 TABLET ORAL at 08:52

## 2019-01-28 RX ADMIN — Medication 10 ML: at 21:26

## 2019-01-28 NOTE — PROGRESS NOTES
General Surgery End of Shift Nursing Note Bedside shift change report given to Dorcas Padilla (oncoming nurse) by Audra (offgoing nurse). Report included the following information SBAR, Kardex, Intake/Output, MAR and Recent Results. Shift worked:   7a-7p Summary of shift:    Uneventful. She has no complaints other than having quite a bit of urinary incontinence despite toileting nearly every hour ( frequently missing the hat, strained urine showed no evidence of stones). Issues for physician to address:   Family members asked about increasing oxybutinin? Donzell Harada

## 2019-01-28 NOTE — PROGRESS NOTES
General Surgery End of Shift Nursing Note Bedside shift change report given to Ginette (oncoming nurse) by Sofia Fischer (offgoing nurse). Report included the following information SBAR, Kardex, Intake/Output, MAR, Accordion, Recent Results and Med Rec Status. Shift worked:   7p-7a Summary of shift:    Patient had no complaints this shift, rested well, labs drawn, no concerns. Remains NPO Issues for physician to address:   none Darshana Jackson

## 2019-01-28 NOTE — PROGRESS NOTES
She has no complaint Patient Vitals for the past 12 hrs: 
 Temp Pulse Resp BP SpO2  
01/28/19 1551 97.7 °F (36.5 °C) 60  126/61 96 % 01/28/19 1125 97.3 °F (36.3 °C) 61 18 161/66 97 % 01/28/19 0747 98.5 °F (36.9 °C) 62 19 139/84 94 % Awake and alert She has an 8mm mid ureter stone on the right Scheduled for a right CRULLS with Dr Monet Serna tomorrow. OK to eat today and NPO p mn tonight

## 2019-01-28 NOTE — PROGRESS NOTES
Problem: Falls - Risk of 
Goal: *Absence of Falls Document Jamila Jean Baptiste Fall Risk and appropriate interventions in the flowsheet. Outcome: Progressing Towards Goal 
Fall Risk Interventions: 
Mobility Interventions: Communicate number of staff needed for ambulation/transfer, Utilize walker, cane, or other assistive device Medication Interventions: Patient to call before getting OOB, Teach patient to arise slowly Elimination Interventions: Patient to call for help with toileting needs, Call light in reach Problem: Pressure Injury - Risk of 
Goal: *Prevention of pressure injury Document Hema Scale and appropriate interventions in the flowsheet. Outcome: Progressing Towards Goal 
Pressure Injury Interventions: 
  
 
Moisture Interventions: Absorbent underpads Activity Interventions: Increase time out of bed, Pressure redistribution bed/mattress(bed type) Mobility Interventions: Pressure redistribution bed/mattress (bed type), PT/OT evaluation Nutrition Interventions: Document food/fluid/supplement intake Friction and Shear Interventions: Minimize layers

## 2019-01-28 NOTE — PROGRESS NOTES
Bedside shift change report given to Nitin Quezada RN by Nitza Ernst. Report included the following information SBAR, ED Summary, Intake/Output, MAR and Recent Results.

## 2019-01-28 NOTE — PROGRESS NOTES
Spiritual Care Partner Volunteer visited patient in Gen Surg on January 28, 2019. Documented by: 
 
MAYITO Sanders, [address] [facility],  [facility]  Paging Service  [phone]

## 2019-01-28 NOTE — PROGRESS NOTES
Problem: Mobility Impaired (Adult and Pediatric) Goal: *Acute Goals and Plan of Care (Insert Text) Physical Therapy Goals Initiated 1/27/2019 1. Patient will move from supine to sit and sit to supine  in bed with independence within 7 day(s). 2.  Patient will transfer from bed to chair and chair to bed with supervision/set-up using the least restrictive device within 7 day(s). 3.  Patient will perform sit to stand with independence within 7 day(s). 4.  Patient will ambulate with supervision/set-up for 200 feet with the least restrictive device within 7 day(s). physical Therapy TREATMENT Patient: Calista Snell (80 y.o. female) Date: 1/28/2019 Diagnosis: Kidney Stone Hydronephrosis <principal problem not specified> Procedure(s) (LRB): 
RIGHT URETEROSCOPY, HOLMIUM LASER, STENT (Right) Precautions:   
Chart, physical therapy assessment, plan of care and goals were reviewed. ASSESSMENT: 
Pt cleared by nurse to mobilize. Pt received up in chair. Pt agreeable to therapy. Pt performed sit to stand transfer at [facility]. Pt ambulated with SBQC at [facility]. Pt initially unsteady reaching out for furniture. With time pt with increased stability and moving well. Pt will left up in chair with all needs met. Pt will benefit from HHPT to continue to improve endurance and strength. Progression toward goals: 
[x]    Improving appropriately and progressing toward goals 
[]    Improving slowly and progressing toward goals 
[]    Not making progress toward goals and plan of care will be adjusted PLAN: 
Patient continues to benefit from skilled intervention to address the above impairments. Continue treatment per established plan of care. Discharge Recommendations:  Home Health Further Equipment Recommendations for Discharge:  None SUBJECTIVE:  
Patient stated I'm just waiting for them to go get this stone.  OBJECTIVE DATA SUMMARY:  
Critical Behavior: Neurologic State: Alert, Appropriate for age, Eyes open spontaneously Orientation Level: Oriented X4 Cognition: Appropriate decision making, Appropriate for age attention/concentration, Appropriate safety awareness, Follows commands Safety/Judgement: Awareness of environment, Decreased awareness of need for safety Functional Mobility Training: 
  
Transfers: 
Sit to Stand: Contact guard assistance Stand to Sit: Contact guard assistance Balance: 
Sitting: Intact Standing: Intact; With support Standing - Static: Good Standing - Dynamic : GoodAmbulation/Gait Training: 
Distance (ft): 190 Feet (ft) Assistive Device: Gait belt;Cane, quad(HHA) Ambulation - Level of Assistance: Contact guard assistance Gait Abnormalities: Decreased step clearance Base of Support: Widened Speed/Any: Pace decreased (<100 feet/min) Step Length: Right shortened;Left shortened Pain: 
Pain Scale 1: Numeric (0 - 10) Pain Intensity 1: 0 Activity Tolerance:  
Pt moving well and tolerance session well. After treatment:  
[x]    Patient left in no apparent distress sitting up in chair 
[]    Patient left in no apparent distress in bed 
[x]    Call bell left within reach [x]    Nursing notified 
[]    Caregiver present 
[]    Bed alarm activated COMMUNICATION/COLLABORATION:  
The patients plan of care was discussed with: Registered Nurse Juan Bray Time Calculation: 12 mins

## 2019-01-28 NOTE — CDMP QUERY
Patient is noted to have a BMI of 32 kg/m2. Please clarify if this patient is:  
 
=>Morbidly obese [BMI >/= 40, or BMI > 35 with obesity-related health conditions (e.g. Type 2 DM, HTN, MARJ, GERD, depression, OA weight bearing joints, urinary stress incontinence, etc.) ] 
=>Obese (BMI 30 - 39.9) =>Overweight (BMI 25 - 29.9) =>Other explanation of clinical findings =>Clinically Undetermined (no explanation for clinical findings) REFERENCE: 
The [address] [place] [address] has issued a statement indicating that, \"Individuals who are overweight, obese, or morbidly obese are at an increased risk for certain medical conditions when compared to persons of normal weight. Therefore, these conditions are always clinically significant and reportable when documented by the provider. Please clarify and document your clinical opinion in the progress notes and discharge summary, including the definitive and or presumptive diagnosis, (suspected or probable), related to the above clinical findings. Please include clinical findings supporting your diagnosis. Thanks, Jarrett Waldrop Rn/CDMP

## 2019-01-29 ENCOUNTER — ANESTHESIA (OUTPATIENT)
Dept: SURGERY | Age: 84
DRG: 661 | End: 2019-01-29
Payer: MEDICARE

## 2019-01-29 ENCOUNTER — APPOINTMENT (OUTPATIENT)
Dept: GENERAL RADIOLOGY | Age: 84
DRG: 661 | End: 2019-01-29
Attending: UROLOGY
Payer: MEDICARE

## 2019-01-29 PROCEDURE — 74011250637 HC RX REV CODE- 250/637: Performed by: INTERNAL MEDICINE

## 2019-01-29 PROCEDURE — 77030018832 HC SOL IRR H20 ICUM -A: Performed by: UROLOGY

## 2019-01-29 PROCEDURE — 74011250636 HC RX REV CODE- 250/636

## 2019-01-29 PROCEDURE — 77030019948 HC FBR LSR HOLM DISP OCOA -E: Performed by: UROLOGY

## 2019-01-29 PROCEDURE — 76000 FLUOROSCOPY <1 HR PHYS/QHP: CPT

## 2019-01-29 PROCEDURE — 74011000250 HC RX REV CODE- 250

## 2019-01-29 PROCEDURE — 74011000250 HC RX REV CODE- 250: Performed by: UROLOGY

## 2019-01-29 PROCEDURE — 76060000033 HC ANESTHESIA 1 TO 1.5 HR: Performed by: UROLOGY

## 2019-01-29 PROCEDURE — 74011250636 HC RX REV CODE- 250/636: Performed by: UROLOGY

## 2019-01-29 PROCEDURE — 77030013079 HC BLNKT BAIR HGGR 3M -A: Performed by: ANESTHESIOLOGY

## 2019-01-29 PROCEDURE — 74455 X-RAY URETHRA/BLADDER: CPT

## 2019-01-29 PROCEDURE — 77030008684 HC TU ET CUF COVD -B: Performed by: NURSE ANESTHETIST, CERTIFIED REGISTERED

## 2019-01-29 PROCEDURE — 65270000029 HC RM PRIVATE

## 2019-01-29 PROCEDURE — 74011250636 HC RX REV CODE- 250/636: Performed by: HOSPITALIST

## 2019-01-29 PROCEDURE — 0TF68ZZ FRAGMENTATION IN RIGHT URETER, VIA NATURAL OR ARTIFICIAL OPENING ENDOSCOPIC: ICD-10-PCS | Performed by: UROLOGY

## 2019-01-29 PROCEDURE — 77030018836 HC SOL IRR NACL ICUM -A: Performed by: UROLOGY

## 2019-01-29 PROCEDURE — 77030019908 HC STETH ESOPH SIMS -A: Performed by: NURSE ANESTHETIST, CERTIFIED REGISTERED

## 2019-01-29 PROCEDURE — C1769 GUIDE WIRE: HCPCS | Performed by: UROLOGY

## 2019-01-29 PROCEDURE — 77010033678 HC OXYGEN DAILY

## 2019-01-29 PROCEDURE — 77030026438 HC STYL ET INTUB CARD -A: Performed by: NURSE ANESTHETIST, CERTIFIED REGISTERED

## 2019-01-29 PROCEDURE — 77030020782 HC GWN BAIR PAWS FLX 3M -B

## 2019-01-29 PROCEDURE — C2617 STENT, NON-COR, TEM W/O DEL: HCPCS | Performed by: UROLOGY

## 2019-01-29 PROCEDURE — 76010000161 HC OR TIME 1 TO 1.5 HR INTENSV-TIER 1: Performed by: UROLOGY

## 2019-01-29 PROCEDURE — 94760 N-INVAS EAR/PLS OXIMETRY 1: CPT

## 2019-01-29 PROCEDURE — 76210000006 HC OR PH I REC 0.5 TO 1 HR: Performed by: UROLOGY

## 2019-01-29 PROCEDURE — 0T768DZ DILATION OF RIGHT URETER WITH INTRALUMINAL DEVICE, VIA NATURAL OR ARTIFICIAL OPENING ENDOSCOPIC: ICD-10-PCS | Performed by: UROLOGY

## 2019-01-29 PROCEDURE — 77030018846 HC SOL IRR STRL H20 ICUM -A: Performed by: UROLOGY

## 2019-01-29 PROCEDURE — 77030021163 HC TUBE CYSTO IRR ICUM -A: Performed by: UROLOGY

## 2019-01-29 DEVICE — URETERAL STENT
Type: IMPLANTABLE DEVICE | Site: URETER | Status: FUNCTIONAL
Brand: POLARIS™ ULTRA

## 2019-01-29 RX ORDER — FENTANYL CITRATE 50 UG/ML
25 INJECTION, SOLUTION INTRAMUSCULAR; INTRAVENOUS
Status: DISCONTINUED | OUTPATIENT
Start: 2019-01-29 | End: 2019-01-29 | Stop reason: HOSPADM

## 2019-01-29 RX ORDER — SODIUM CHLORIDE 0.9 % (FLUSH) 0.9 %
5-40 SYRINGE (ML) INJECTION EVERY 8 HOURS
Status: DISCONTINUED | OUTPATIENT
Start: 2019-01-29 | End: 2019-01-29 | Stop reason: HOSPADM

## 2019-01-29 RX ORDER — SODIUM CHLORIDE, SODIUM LACTATE, POTASSIUM CHLORIDE, CALCIUM CHLORIDE 600; 310; 30; 20 MG/100ML; MG/100ML; MG/100ML; MG/100ML
25 INJECTION, SOLUTION INTRAVENOUS CONTINUOUS
Status: DISCONTINUED | OUTPATIENT
Start: 2019-01-29 | End: 2019-01-29 | Stop reason: HOSPADM

## 2019-01-29 RX ORDER — SODIUM CHLORIDE, SODIUM LACTATE, POTASSIUM CHLORIDE, CALCIUM CHLORIDE 600; 310; 30; 20 MG/100ML; MG/100ML; MG/100ML; MG/100ML
INJECTION, SOLUTION INTRAVENOUS
Status: DISCONTINUED | OUTPATIENT
Start: 2019-01-29 | End: 2019-01-29 | Stop reason: HOSPADM

## 2019-01-29 RX ORDER — LIDOCAINE HYDROCHLORIDE 10 MG/ML
0.1 INJECTION, SOLUTION EPIDURAL; INFILTRATION; INTRACAUDAL; PERINEURAL AS NEEDED
Status: DISCONTINUED | OUTPATIENT
Start: 2019-01-29 | End: 2019-01-30 | Stop reason: HOSPADM

## 2019-01-29 RX ORDER — SODIUM CHLORIDE 0.9 % (FLUSH) 0.9 %
5-40 SYRINGE (ML) INJECTION AS NEEDED
Status: DISCONTINUED | OUTPATIENT
Start: 2019-01-29 | End: 2019-01-30 | Stop reason: HOSPADM

## 2019-01-29 RX ORDER — CEFAZOLIN SODIUM/WATER 2 G/20 ML
2 SYRINGE (ML) INTRAVENOUS ONCE
Status: COMPLETED | OUTPATIENT
Start: 2019-01-30 | End: 2019-01-29

## 2019-01-29 RX ORDER — EPHEDRINE SULFATE 50 MG/ML
INJECTION, SOLUTION INTRAVENOUS AS NEEDED
Status: DISCONTINUED | OUTPATIENT
Start: 2019-01-29 | End: 2019-01-29 | Stop reason: HOSPADM

## 2019-01-29 RX ORDER — ONDANSETRON 2 MG/ML
INJECTION INTRAMUSCULAR; INTRAVENOUS AS NEEDED
Status: DISCONTINUED | OUTPATIENT
Start: 2019-01-29 | End: 2019-01-29 | Stop reason: HOSPADM

## 2019-01-29 RX ORDER — FENTANYL CITRATE 50 UG/ML
INJECTION, SOLUTION INTRAMUSCULAR; INTRAVENOUS AS NEEDED
Status: DISCONTINUED | OUTPATIENT
Start: 2019-01-29 | End: 2019-01-29 | Stop reason: HOSPADM

## 2019-01-29 RX ORDER — DIPHENHYDRAMINE HYDROCHLORIDE 50 MG/ML
12.5 INJECTION, SOLUTION INTRAMUSCULAR; INTRAVENOUS AS NEEDED
Status: DISCONTINUED | OUTPATIENT
Start: 2019-01-29 | End: 2019-01-29 | Stop reason: HOSPADM

## 2019-01-29 RX ORDER — SODIUM CHLORIDE 0.9 % (FLUSH) 0.9 %
5-40 SYRINGE (ML) INJECTION AS NEEDED
Status: DISCONTINUED | OUTPATIENT
Start: 2019-01-29 | End: 2019-01-29 | Stop reason: HOSPADM

## 2019-01-29 RX ORDER — SUCCINYLCHOLINE CHLORIDE 20 MG/ML
INJECTION INTRAMUSCULAR; INTRAVENOUS AS NEEDED
Status: DISCONTINUED | OUTPATIENT
Start: 2019-01-29 | End: 2019-01-29 | Stop reason: HOSPADM

## 2019-01-29 RX ORDER — SODIUM CHLORIDE 0.9 % (FLUSH) 0.9 %
5-40 SYRINGE (ML) INJECTION EVERY 8 HOURS
Status: DISCONTINUED | OUTPATIENT
Start: 2019-01-29 | End: 2019-01-30 | Stop reason: HOSPADM

## 2019-01-29 RX ORDER — HYDROMORPHONE HYDROCHLORIDE 1 MG/ML
0.2 INJECTION, SOLUTION INTRAMUSCULAR; INTRAVENOUS; SUBCUTANEOUS
Status: DISCONTINUED | OUTPATIENT
Start: 2019-01-29 | End: 2019-01-29 | Stop reason: HOSPADM

## 2019-01-29 RX ORDER — ROCURONIUM BROMIDE 10 MG/ML
INJECTION, SOLUTION INTRAVENOUS AS NEEDED
Status: DISCONTINUED | OUTPATIENT
Start: 2019-01-29 | End: 2019-01-29 | Stop reason: HOSPADM

## 2019-01-29 RX ORDER — LIDOCAINE HYDROCHLORIDE 20 MG/ML
JELLY TOPICAL AS NEEDED
Status: DISCONTINUED | OUTPATIENT
Start: 2019-01-29 | End: 2019-01-30 | Stop reason: HOSPADM

## 2019-01-29 RX ORDER — PROPOFOL 10 MG/ML
INJECTION, EMULSION INTRAVENOUS AS NEEDED
Status: DISCONTINUED | OUTPATIENT
Start: 2019-01-29 | End: 2019-01-29 | Stop reason: HOSPADM

## 2019-01-29 RX ORDER — LIDOCAINE HYDROCHLORIDE 20 MG/ML
INJECTION, SOLUTION EPIDURAL; INFILTRATION; INTRACAUDAL; PERINEURAL AS NEEDED
Status: DISCONTINUED | OUTPATIENT
Start: 2019-01-29 | End: 2019-01-29 | Stop reason: HOSPADM

## 2019-01-29 RX ADMIN — EPHEDRINE SULFATE 10 MG: 50 INJECTION, SOLUTION INTRAVENOUS at 08:15

## 2019-01-29 RX ADMIN — Medication 10 ML: at 13:07

## 2019-01-29 RX ADMIN — Medication 10 ML: at 05:10

## 2019-01-29 RX ADMIN — PANTOPRAZOLE SODIUM 40 MG: 40 TABLET, DELAYED RELEASE ORAL at 10:29

## 2019-01-29 RX ADMIN — Medication 10 ML: at 21:09

## 2019-01-29 RX ADMIN — VITAMIN D, TAB 1000IU (100/BT) 1000 UNITS: 25 TAB at 10:29

## 2019-01-29 RX ADMIN — LIDOCAINE HYDROCHLORIDE 50 MG: 20 INJECTION, SOLUTION EPIDURAL; INFILTRATION; INTRACAUDAL; PERINEURAL at 07:57

## 2019-01-29 RX ADMIN — Medication 2 G: at 08:03

## 2019-01-29 RX ADMIN — SUCCINYLCHOLINE CHLORIDE 140 MG: 20 INJECTION INTRAMUSCULAR; INTRAVENOUS at 07:57

## 2019-01-29 RX ADMIN — OXYBUTYNIN CHLORIDE 10 MG: 5 TABLET, EXTENDED RELEASE ORAL at 10:29

## 2019-01-29 RX ADMIN — ONDANSETRON 4 MG: 2 INJECTION INTRAMUSCULAR; INTRAVENOUS at 08:23

## 2019-01-29 RX ADMIN — Medication 10 ML: at 10:29

## 2019-01-29 RX ADMIN — FENTANYL CITRATE 50 MCG: 50 INJECTION, SOLUTION INTRAMUSCULAR; INTRAVENOUS at 07:57

## 2019-01-29 RX ADMIN — SODIUM CHLORIDE, SODIUM LACTATE, POTASSIUM CHLORIDE, CALCIUM CHLORIDE: 600; 310; 30; 20 INJECTION, SOLUTION INTRAVENOUS at 07:00

## 2019-01-29 RX ADMIN — FENTANYL CITRATE 10 MCG: 50 INJECTION, SOLUTION INTRAMUSCULAR; INTRAVENOUS at 08:45

## 2019-01-29 RX ADMIN — ATORVASTATIN CALCIUM 10 MG: 10 TABLET, FILM COATED ORAL at 10:29

## 2019-01-29 RX ADMIN — PROPOFOL 140 MG: 10 INJECTION, EMULSION INTRAVENOUS at 07:57

## 2019-01-29 RX ADMIN — ROCURONIUM BROMIDE 10 MG: 10 INJECTION, SOLUTION INTRAVENOUS at 07:57

## 2019-01-29 RX ADMIN — AMLODIPINE BESYLATE 5 MG: 5 TABLET ORAL at 10:29

## 2019-01-29 RX ADMIN — SODIUM CHLORIDE 50 ML/HR: 900 INJECTION, SOLUTION INTRAVENOUS at 21:06

## 2019-01-29 NOTE — PROGRESS NOTES
Problem: Falls - Risk of 
Goal: *Absence of Falls Document Magalis Fearing Fall Risk and appropriate interventions in the flowsheet. Outcome: Progressing Towards Goal 
Fall Risk Interventions: 
Mobility Interventions: Patient to call before getting OOB Medication Interventions: Patient to call before getting OOB, Teach patient to arise slowly Elimination Interventions: Call light in reach Problem: Pressure Injury - Risk of 
Goal: *Prevention of pressure injury Document Hema Scale and appropriate interventions in the flowsheet. Outcome: Progressing Towards Goal 
Pressure Injury Interventions: 
  
 
Moisture Interventions: Maintain skin hydration (lotion/cream) Activity Interventions: Increase time out of bed Mobility Interventions: Turn and reposition approx. every two hours(pillow and wedges) Nutrition Interventions: Document food/fluid/supplement intake, Offer support with meals,snacks and hydration Friction and Shear Interventions: Minimize layers

## 2019-01-29 NOTE — PROGRESS NOTES
General Surgery End of Shift Nursing Note 
  Bedside shift change report given to Jeison Ku (OR nurse) by Nel Landry (offgoing nurse). Report included the following information SBAR, Kardex, ED Summary, Intake/Output, MAR, Accordion and Med Rec Status. 
  
Shift worked:   7p-7a Summary of shift:    Patient rested well this shift. 2 CHG baths given overnight. Report given to OR nurse. Patient remains alert and oriented x 4 with no complaints. Patient transported to [address] Issues for physician to address:   none  
  
  
  
Paty Dyer

## 2019-01-29 NOTE — OP NOTES
[address] REPORT    Joshua Torres  MR#: [id number]  : 1933  ACCOUNT #: [de-identified]   DATE OF SERVICE: 2019    PREOPERATIVE DIAGNOSES:  Right ureteral stone and bilateral renal stones. POSTOPERATIVE DIAGNOSES:  Right ureteral stone and bilateral renal stones. PROCEDURES PERFORMED:  Cystoscopy, right ureteroscopy, holmium laser stone fragmentation, placement of right ureteral stent. SURGEON:  Anthony Carter MD    ASSISTANT:  0    ESTIMATED BLOOD LOSS:  Minimal.    COMPLICATIONS:  None. SPECIMENS REMOVED:  None. IMPLANTS:  Right ureteral stent. ANESTHESIA:  General.    INDICATION:  The patient is an 80-year-old female. She was admitted to the hospital with right flank pain. She has been pain free for several days, but preferred to undergo ureteroscopy for an approximately 8 mm mid right ureteral stone. She has other small nonobstructing renal stones. We have discussed these findings and she understands we are not addressing renal stones today. DESCRIPTION OF PROCEDURE:  The patient was taken to the operating room, given general anesthesia, placed in a dorsal lithotomy position, and prepped and draped in standard fashion. The 21 scope was inserted. The bladder was inspected and found to be normal.  A 0.035 Glidewire was inserted up the right ureter beyond the stone, which was visible on fluoroscopy. The wire was positioned appropriately in the kidney. The mini ureteroscope was then advanced adjacent to the wire. The stone was visualized in the expected position at the mid ureter. The holmium laser fiber was used to fragment this into submillimeter particles. The stone was passed to the right UPJ with no other significant stone noted. The scope was carefully withdrawn. There was no evidence of injury to the ureter wall and there were no significant fragments remaining. The cystoscope was backloaded over the wire.   A 5 x 26 double-J stent was placed and coiled in the kidney and the bladder appropriately. Bladder drained. String was left 4 inches beyond the urethral meatus and tucked vaginally. Uro-Jet instilled. She was reversed from anesthesia and taken to recovery in stable condition. She can resume all medications and diet. She is okay for discharge from a Urology standpoint. We will make arrangements for followup in the office for probable baseline KUB and stent removal with external string.       MD Veto Pretty  D: 01/29/2019 08:56     T: 01/29/2019 09:50  JOB #: [id number]

## 2019-01-29 NOTE — PROGRESS NOTES
Per Dr. Jet Cuevas, pt could discharge as early as tomorrow. Dr. Jet Cuevas in agreement with pt being seen by both PT and OT. CM will continue to follow pt for d/c planning. Cuba Hsu, Care Manager [phone]

## 2019-01-29 NOTE — PROGRESS NOTES
General Surgery End of Shift Nursing Note Bedside shift change report given to Klaudia ROMERO (oncoming nurse) by Kaila Richardson RN (offgoing nurse). Report included the following information SBAR, Kardex, Intake/Output, MAR and Recent Results. Shift worked:   7a to 3p Summary of shift:    Postop, had procedure today, VS WNL Issues for physician to address:   none Number times ambulated in hallway past shift: 0 Number of times OOB to chair past shift: 0 Pain Management: 
Current medication: none Patient states pain is manageable on current pain medication: YES 
 
GI: 
 
Current diet:  DIET DENTAL SOFT (SOFT SOLID) Tolerating current diet: YES Passing flatus: YES Last Bowel Movement: 1/25/19 Appearance: soft Respiratory: 
 
Incentive Spirometer at bedside: YES Patient instructed on use: YES Patient Safety: 
 
Falls Score: 3 Bed Alarm On? Yes Sitter?  No 
 
Tani Hudson RN

## 2019-01-29 NOTE — PROGRESS NOTES
TRANSFER - IN REPORT: 
 
Verbal report received from [facility]an [facility] for routine post - op Report consisted of patients Situation, Background, Assessment and  
Recommendations(SBAR). Information from the following report(s) SBAR, Kardex, OR Summary, Procedure Summary, Intake/Output, MAR and Recent Results was reviewed with the receiving nurse. Opportunity for questions and clarification was provided. Assessment completed upon patients arrival to unit and care assumed.

## 2019-01-29 NOTE — PROGRESS NOTES
Reason for Admission:   Kidney Stone Hydrophenesis RRAT Score:  19 Do you (patient/family) have any concerns for transition/discharge? Pt family expressed concern about pt incontinence. Plan for utilizing home health:    Pt and family open to using [facility]. Likelihood of readmission? Moderate based on RRAT Transition of Care Plan:   CM discussed pt discharge planning with pt daughter, Davina Amaya, due to pt exhibiting some confusion. CM verified pt demographic, insurance, and PCP information. Pt resides alone in a one story home (Ramp to entry). Pt could previously complete ADLs/IADLs and drove short distances. Pt uses a cane to ambulate and has access to walker if needed. Pt last seen by PCP in December 2018. Pt has previous SNF and [facility] experience. Pt has supportive family but her daughters live in [place]. Davina Amaya is oldest daughter ([phone]). Plan will be for pt to discharge home with [facility] or to SNF. Pt family prefers SNF ([facility])  as her daughters reside in [place]. If pt discharges home she will stay with her daughter at [address] [place] [address] and she will have [facility] services. CM will continue to follow pt for d/c planning. Care Management Interventions Mode of Transport at Discharge: Other (see comment) Transition of Care Consult (CM Consult): Discharge Planning Discharge Durable Medical Equipment: No 
Physical Therapy Consult: Yes Current Support Network: Own Home Confirm Follow Up Transport: Family Plan discussed with Pt/Family/Caregiver: Yes Discharge Location Discharge Placement: Home with home health Veronica Cook, Care Manager [phone]

## 2019-01-29 NOTE — ROUTINE PROCESS
Bedside shift change report given to Alejandra Ferrell (oncoming nurse) by Rema Orta (offgoing nurse). Report included the following information SBAR, Kardex, OR Summary, Procedure Summary and Intake/Output Pt had no complaints of pain. Remained in the chair entire shift. Tolerated meals with no report of nausea or vomit. Advised night shift nurse pt is NPO at midnight and needs CHG wipes at midnight.

## 2019-01-29 NOTE — ANESTHESIA PREPROCEDURE EVALUATION
Anesthetic History No history of anesthetic complications Review of Systems / Medical History Patient summary reviewed, nursing notes reviewed and pertinent labs reviewed Pulmonary Within defined limits Neuro/Psych CVA TIA Cardiovascular Hypertension Exercise tolerance: <4 METS 
  
GI/Hepatic/Renal 
  
GERD Endo/Other Obesity Other Findings Comments: Kidney stone Physical Exam 
 
Airway Mallampati: II 
TM Distance: 4 - 6 cm Neck ROM: normal range of motion Mouth opening: Normal 
 
 Cardiovascular Regular rate and rhythm,  S1 and S2 normal,  no murmur, click, rub, or gallop Dental 
 
Dentition: Edentulous, Full lower dentures and Full upper dentures Pulmonary Breath sounds clear to auscultation Abdominal 
GI exam deferred Other Findings Anesthetic Plan ASA: 3 Anesthesia type: general 
 
Monitoring Plan: BIS Induction: Intravenous Anesthetic plan and risks discussed with: Patient

## 2019-01-29 NOTE — PERIOP NOTES
Patient incontinent of large amount of urine in preop. Assisted to BR to clean herself & linens changed

## 2019-01-29 NOTE — BRIEF OP NOTE
BRIEF OPERATIVE NOTE Date of Procedure: 1/29/2019 Preoperative Diagnosis: KIDNEY STONE Postoperative Diagnosis: KIDNEY STONE Procedure(s): RIGHT URETEROSCOPY, HOLMIUM LASER, STENT Surgeon(s) and Role: * Katy Thrasher MD - Primary Surgical Assistant: 0 Surgical Staff: 
Circ-1: Charlotte Mark RN Radiology Technician: Harper Pena Scrub Tech-1: Suzie Pa Staff: [facility] Event Time In Time Out Incision Start 01/29/2019 0481 Incision Close 01/29/2019 0845 Anesthesia: General  
Estimated Blood Loss: 0 Specimens: * No specimens in log * Findings:  
Bladder WNL,  8 mm right ureteral stone--laser fragmented    5 x 26  JJ stent with external string Complications: 0 Implants: * No implants in log * Dictated The patient may resume diet and all meds from our standpoint. OK for D/C to home from  standpoint. We will arrange office f/u for stent removal. Please call if new issues.

## 2019-01-29 NOTE — PROGRESS NOTES
Physical Therapy Patient currently in [address] Will defer therapy at this time. Per PT notes prior to surgery. Patient was moving well using quad cane. Recommending HHPT to ensure safe mobility in home envirnoment. Will continue to follow.  
Charity Christensen, PT

## 2019-01-29 NOTE — PROGRESS NOTES
Hospitalist Progress Note NAME: Doug Evangelista :  1933 MRN:  [id number] Assessment / Plan: Moderate hydronephrosis secondary to right mid ureter kidney stone Acute renal failure, secondary to obstructive uropathy 
-CT scan which demonstrated some right-sided hydronephrosis as well as an 8 mm mid right ureteral calculus . -NPO for cystoscopy and right ureteroscopy with laser and right ureteral stent placement in AM 
-holding eliquis MARCIE, resolving 
-IVFs 
-holding ACEi Hypertension 
-continue norvasc 
-echo 2015: ef 60% no wall motion abnormalities History of stroke/carotid stenosis  
-Hold aspirin and Eliquis. Hyperlipidemia Continue Lipitor 40 mg daily 
 
 
  
Code Status: Full Surrogate Decision Maker:sister  
  
DVT Prophylaxis: SCD 
GI Prophylaxis: protonix 
  
Baseline: independent , use cane Lives in [place] near [address] [facility], lives by herself Met the patient 3 daughters today ( who live in [place]) PT recommended HH with increased assistance vs snf Code status: full Prophylaxis: scds (eliquis on hold) Recommended Disposition: tbd Subjective: Chief Complaint / Reason for Physician Visit Follow up on moderate hydronephrosis Discussed with RN events overnight. Review of Systems: 
Symptom Y/N Comments  Symptom Y/N Comments Fever/Chills n   Chest Pain n   
Poor Appetite    Edema Cough n   Abdominal Pain n   
Sputum    Joint Pain SOB/FERNANDEZ n   Pruritis/Rash Nausea/vomit    Tolerating PT/OT Diarrhea    Tolerating Diet Constipation    Other Could NOT obtain due to:   
 
Objective: VITALS:  
Last 24hrs VS reviewed since prior progress note. Most recent are: 
Patient Vitals for the past 24 hrs: 
 Temp Pulse Resp BP SpO2  
19 2052 98 °F (36.7 °C) 66 18 140/73 95 % 19 1551 97.7 °F (36.5 °C) 60  126/61 96 % 19 1125 97.3 °F (36.3 °C) 61 18 161/66 97 % 01/28/19 0747 98.5 °F (36.9 °C) 62 19 139/84 94 % 01/28/19 0350 98 °F (36.7 °C) 60 17 134/66 95 % Intake/Output Summary (Last 24 hours) at 1/28/2019 2115 Last data filed at 1/28/2019 2000 Gross per 24 hour Intake 1460 ml Output 1200 ml Net 260 ml PHYSICAL EXAM: 
General: WD, WN. Alert, cooperative,  no acute distress   
EENT:  EOMI. Anicteric sclerae. MMM Resp:  Bilateral air entry, no crackles  No accessory muscle use CV:  Regular  rhythm,  No edema GI:  Soft, Non distended, Non tender.  +Bowel sounds Neurologic:  Alert and oriented X 3, normal speech, Psych:   Good insight. Not anxious nor agitated Skin:  No rashes. No jaundice Reviewed most current lab test results and cultures  YES Reviewed most current radiology test results   YES Review and summation of old records today    NO Reviewed patient's current orders and MAR    YES 
PMH/SH reviewed - no change compared to H&P 
________________________________________________________________________ Care Plan discussed with: 
  Comments Patient y Family RN y   
Care Manager Consultant Multidiciplinary team rounds were held today with , nursing, pharmacist and clinical coordinator. Patient's plan of care was discussed; medications were reviewed and discharge planning was addressed. ________________________________________________________________________ Total NON critical care TIME: 30  Minutes Total CRITICAL CARE TIME Spent:   Minutes non procedure based Comments >50% of visit spent in counseling and coordination of care    
________________________________________________________________________ Solo Baez MD  
 
Procedures: see electronic medical records for all procedures/Xrays and details which were not copied into this note but were reviewed prior to creation of Plan. LABS: 
I reviewed today's most current labs and imaging studies. Pertinent labs include: 
Recent Labs  
  01/28/19 0059 01/27/19 
7524 WBC 5.8 5.4 HGB 11.6 11.3* HCT 36.0 36.8 * 140* Recent Labs  
  01/28/19 0059   
K 4.0  
* CO2 28 * BUN 19  
CREA 1.22* CA 8.6 Signed: Sha Lora MD

## 2019-01-29 NOTE — ANESTHESIA POSTPROCEDURE EVALUATION
Procedure(s): 
CYSTOSCOPY, RIGHT URETEROSCOPY, LASER STONE FRAGMENTATION, STENT PLACEMENT RIGHT URETER. Anesthesia Post Evaluation Patient location during evaluation: PACU Note status: Adequate. Level of consciousness: responsive to verbal stimuli and sleepy but conscious Pain management: satisfactory to patient Airway patency: patent Anesthetic complications: no 
Cardiovascular status: acceptable Respiratory status: acceptable Hydration status: acceptable Comments: +Post-Anesthesia Evaluation and Assessment Patient: Nikunj Pina MRN: [id number]  SSN: [id number] YOB: 1933  Age: 80 y.o. Sex: female Cardiovascular Function/Vital Signs /60 (BP 1 Location: Left arm, BP Patient Position: At rest)   Pulse 68   Temp 36.6 °C (97.9 °F)   Resp 21   SpO2 100% Patient is status post Procedure(s): 
CYSTOSCOPY, RIGHT URETEROSCOPY, LASER STONE FRAGMENTATION, STENT PLACEMENT RIGHT URETER. Nausea/Vomiting: Controlled. Postoperative hydration reviewed and adequate. Pain: 
Pain Scale 1: Numeric (0 - 10) (01/29/19 0930) Pain Intensity 1: 0 (01/29/19 0930) Managed. Neurological Status:  
Neuro (WDL): Exceptions to WDL (01/29/19 0905) At baseline. Mental Status and Level of Consciousness: Arousable. Pulmonary Status:  
O2 Device: Nasal cannula (01/29/19 0930) Adequate oxygenation and airway patent. Complications related to anesthesia: None Post-anesthesia assessment completed. No concerns. Signed By: Shahida Vela MD  
 1/29/2019 Post anesthesia nausea and vomiting:  controlled Visit Vitals /60 (BP 1 Location: Left arm, BP Patient Position: At rest) Pulse 68 Temp 36.6 °C (97.9 °F) Resp 21 SpO2 100%

## 2019-01-29 NOTE — PERIOP NOTES
TRANSFER - IN REPORT: 
 
Verbal report received from [facility] on [address]  being received from 2118 for ordered procedure Report consisted of patients Situation, Background, Assessment and  
Recommendations(SBAR). Information from the following report(s) SBAR, Kardex, Intake/Output, MAR and Recent Results was reviewed with the receiving nurse. Opportunity for questions and clarification was provided. Assessment completed upon patients arrival to unit and care assumed.

## 2019-01-29 NOTE — PERIOP NOTES
TRANSFER - OUT REPORT: 
 
Verbal report given to Mayra Mcdonald RN (name) on Alannah Villeda  being transferred to [facility] (unit) for routine post - op Report consisted of patients Situation, Background, Assessment and  
Recommendations(SBAR). Information from the following report(s) SBAR, Kardex, OR Summary, Procedure Summary, Intake/Output and MAR was reviewed with the receiving nurse. Lines:  
Peripheral IV 01/28/19 Right Antecubital (Active) Site Assessment Clean, dry, & intact 1/29/2019  9:05 AM  
Phlebitis Assessment 0 1/29/2019  9:05 AM  
Infiltration Assessment 0 1/29/2019  9:05 AM  
Dressing Status Clean, dry, & intact 1/29/2019  9:05 AM  
Dressing Type Tape;Transparent 1/29/2019  9:05 AM  
Hub Color/Line Status Pink; Infusing 1/29/2019  9:05 AM  
Alcohol Cap Used Yes 1/28/2019  8:00 PM  
  
 
Opportunity for questions and clarification was provided. Patient transported with: 
 O2 @ 2 liters Tech

## 2019-01-30 VITALS
TEMPERATURE: 98.4 F | RESPIRATION RATE: 17 BRPM | DIASTOLIC BLOOD PRESSURE: 68 MMHG | HEART RATE: 94 BPM | OXYGEN SATURATION: 92 % | SYSTOLIC BLOOD PRESSURE: 132 MMHG

## 2019-01-30 PROCEDURE — 97530 THERAPEUTIC ACTIVITIES: CPT | Performed by: PHYSICAL THERAPIST

## 2019-01-30 PROCEDURE — 94760 N-INVAS EAR/PLS OXIMETRY 1: CPT

## 2019-01-30 PROCEDURE — 97116 GAIT TRAINING THERAPY: CPT | Performed by: PHYSICAL THERAPIST

## 2019-01-30 PROCEDURE — 74011250637 HC RX REV CODE- 250/637: Performed by: INTERNAL MEDICINE

## 2019-01-30 RX ADMIN — VITAMIN D, TAB 1000IU (100/BT) 1000 UNITS: 25 TAB at 10:04

## 2019-01-30 RX ADMIN — OXYBUTYNIN CHLORIDE 10 MG: 5 TABLET, EXTENDED RELEASE ORAL at 10:04

## 2019-01-30 RX ADMIN — ATORVASTATIN CALCIUM 10 MG: 10 TABLET, FILM COATED ORAL at 10:04

## 2019-01-30 RX ADMIN — PANTOPRAZOLE SODIUM 40 MG: 40 TABLET, DELAYED RELEASE ORAL at 10:04

## 2019-01-30 RX ADMIN — Medication 10 ML: at 05:08

## 2019-01-30 RX ADMIN — AMLODIPINE BESYLATE 5 MG: 5 TABLET ORAL at 10:04

## 2019-01-30 NOTE — PROGRESS NOTES
Hospitalist Progress Note NAME: Jacike Sierra :  1933 MRN:  [id number] Assessment / Plan: Moderate hydronephrosis secondary to right mid ureter kidney stone 
-CT scan which demonstrated some right-sided hydronephrosis as well as an 8 mm mid right ureteral calculus . -S/P Cystoscopy, right ureteroscopy, holmium laser stone fragmentation, placement of right ureteral stent.  
-DC planning in AM with HH PT OT 
-restart ASA / eliquis at discharge MARCIE, resolving 
-IVFs 
-holding ACEi. Check BMP in AM 
 
Hypertension 
-continue norvasc 
-echo 2015: ef 60% no wall motion abnormalities History of stroke/carotid stenosis  
-Hold aspirin and Eliquis. -reviewed past records and it is not clear why the patient is on Eliquis. She was started on this when she was hospitalized several years ago for TIA in [place].  Presumably she was found to have Afib at that time. Hyperlipidemia Continue Lipitor 40 mg daily 
 
 
  
Code Status: Full Surrogate Decision Maker:sister  
  
DVT Prophylaxis: SCD 
GI Prophylaxis: protonix 
  
Baseline: independent , use cane Lives in [place], lives by herself Met the patient 3 daughters today ( who live in [place]) PT recommended HH with increased assistance vs snf Code status: full Prophylaxis: scds (eliquis on hold) Recommended Disposition: tbd Subjective: Chief Complaint / Reason for Physician Visit Follow up on moderate hydronephrosis Discussed with RN events overnight. No new complaints. Review of Systems: 
Symptom Y/N Comments  Symptom Y/N Comments Fever/Chills n   Chest Pain n   
Poor Appetite    Edema Cough n   Abdominal Pain n   
Sputum    Joint Pain SOB/FERNANDEZ n   Pruritis/Rash Nausea/vomit    Tolerating PT/OT Diarrhea    Tolerating Diet Constipation    Other Could NOT obtain due to:   
 
Objective: VITALS:  
 Last 24hrs VS reviewed since prior progress note. Most recent are: 
Patient Vitals for the past 24 hrs: 
 Temp Pulse Resp BP SpO2  
01/29/19 1940 98.5 °F (36.9 °C) 68 17 112/68 98 % 01/29/19 1439 98.8 °F (37.1 °C) 65 17 117/66 98 % 01/29/19 1146 97.9 °F (36.6 °C) 67 16 161/86 98 % 01/29/19 1115 97.6 °F (36.4 °C) 75 16 138/75 98 % 01/29/19 1045 97.6 °F (36.4 °C) 90 16 (!) 135/97 95 % 01/29/19 1014 97.6 °F (36.4 °C) 63 18 147/77 98 % 01/29/19 0945 98.2 °F (36.8 °C) 61 17 128/63 99 % 01/29/19 0930  68 21 120/60 100 % 01/29/19 0915  69 17 121/55 100 % 01/29/19 0910  75 17 124/58 94 % 01/29/19 0905  80 17 120/56 99 % 01/29/19 0903 97.9 °F (36.6 °C) 84 20 121/55 100 % 01/29/19 0900  81 18 121/55 98 % 01/29/19 0642 98.1 °F (36.7 °C) 62 18 143/71 96 % 01/29/19 0346 98.4 °F (36.9 °C) 62 17 136/64 95 % 01/28/19 2319 98.1 °F (36.7 °C) 60 18 141/54 96 % Intake/Output Summary (Last 24 hours) at 1/29/2019 2103 Last data filed at 1/29/2019 2000 Gross per 24 hour Intake 2260 ml Output 1000 ml Net 1260 ml PHYSICAL EXAM: 
General: WD, WN. Alert, cooperative,  no acute distress   
EENT:  EOMI. Anicteric sclerae. MMM Resp:  Bilateral air entry, no crackles  No accessory muscle use CV:  Regular  rhythm,  No edema GI:  Soft, Non distended, Non tender.  +Bowel sounds Neurologic:  Alert and oriented X 3, normal speech, Psych:   Good insight. Not anxious nor agitated Skin:  No rashes. No jaundice Reviewed most current lab test results and cultures  YES Reviewed most current radiology test results   YES Review and summation of old records today    NO Reviewed patient's current orders and MAR    YES 
PMH/SH reviewed - no change compared to H&P 
________________________________________________________________________ Care Plan discussed with: 
  Comments Patient y Family RN y   
Care Manager Consultant Multidiciplinary team rounds were held today with , nursing, pharmacist and clinical coordinator. Patient's plan of care was discussed; medications were reviewed and discharge planning was addressed. ________________________________________________________________________ Total NON critical care TIME: 20  Minutes Total CRITICAL CARE TIME Spent:   Minutes non procedure based Comments >50% of visit spent in counseling and coordination of care    
________________________________________________________________________ Minh Sheikh MD  
 
Procedures: see electronic medical records for all procedures/Xrays and details which were not copied into this note but were reviewed prior to creation of Plan. LABS: 
I reviewed today's most current labs and imaging studies. Pertinent labs include: 
Recent Labs  
  01/28/19 0059 01/27/19 
3387 WBC 5.8 5.4 HGB 11.6 11.3* HCT 36.0 36.8 * 140* Recent Labs  
  01/28/19 
0059   
K 4.0  
* CO2 28 * BUN 19  
CREA 1.22* CA 8.6 Signed: Minh Sheikh MD

## 2019-01-30 NOTE — DISCHARGE INSTRUCTIONS
HOSPITALIST DISCHARGE INSTRUCTIONS    NAME: Belén Tineo   :  1933   MRN:  [id number]     Date/Time:  2019 8:10 AM    ADMIT DATE: 2019   DISCHARGE DATE: 2019         · It is important that you take the medication exactly as they are prescribed. · Keep your medication in the bottles provided by the pharmacist and keep a list of the medication names, dosages, and times to be taken in your wallet. · Do not take other medications without consulting your doctor. What to do at Home    Recommended diet:  Cardiac Diet    Recommended activity: PT/OT per Home Health      If you have questions regarding the hospital related prescriptions or hospital related issues please call [facility] at [phone]. You can always direct your questions to your primary care doctor if you are unable to reach your hospital physician; your PCP works as an extension of your hospital doctor just like your hospital doctor is an extension of your PCP for your time at the hospital [facility])    If you experience any of the following symptoms then please call your primary care physician or return to the emergency room if you cannot get hold of your doctor:    Fever, chills, nausea, vomiting, or persistent diarrhea  Worsening weakness or new problems with your speech or balance  Dark stools or visible blood in your stools  New Leg swelling or shortness of breath as these could be signs of a clot    Additional Instructions:      Bring these papers with you to your follow up appointments. The papers will help your doctors be sure to continue the care plan from the hospital.              Information obtained by :  I understand that if any problems occur once I am at home I am to contact my physician. I understand and acknowledge receipt of the instructions indicated above. Physician's or R.N.'s Signature                                                                  Date/Time                                                                                                                                              Patient or Representative Signature

## 2019-01-30 NOTE — DISCHARGE SUMMARY
Hospitalist Discharge Summary     Patient ID:  Mikala Walden  [id number]  80 y.o.  11/30/1933    PCP on record: Ramona Blanco MD    Admit date: 1/26/2019  Discharge date and time: 1/30/2019      DISCHARGE DIAGNOSIS:    Moderate hydronephrosis secondary to right mid ureter kidney stone  -S/P Cystoscopy, right ureteroscopy, holmium laser stone fragmentation, placement of right ureteral stent. 1/29  MARCIE, resolved  Hypertension  History of stroke/carotid stenosis   Hyperlipidemia    CONSULTATIONS:  IP CONSULT TO UROLOGY    Excerpted HPI from H&P of Mildred Potts MD:  CHIEF COMPLAINT: RLQ pain      HISTORY OF PRESENT ILLNESS:     years old female with past medical history significant for hypertension, GERD, kidney stone, history of CVA was transferred from [facility] because of right lower quadrant abdominal pain started today, patient denies any nausea any vomiting any fever any chills any change in color of urine any diarrhea, abdominal CT scan was done on show moderate right hydronephrosis with 8 mm stone right mid ureter, patient was transferred for urology evaluation, blood work was done in ED and show mild worsening creatinine 1.66, transfer center discussed the case with the accepting hospitalist and urology on-call and agree about the transfer.              ______________________________________________________________________  DISCHARGE SUMMARY/HOSPITAL COURSE:  for full details see H&P, daily progress notes, labs, consult notes. Moderate hydronephrosis secondary to right mid ureter kidney stone  -CT scan which demonstrated some right-sided hydronephrosis as well as an 8 mm mid right ureteral calculus . -S/P Cystoscopy, right ureteroscopy, holmium laser stone fragmentation, placement of right ureteral stent.   1/29  -DC planning in AM with HH PT OT  -restart ASA / eliquis at discharge     MARCIE, resolved    Hypertension  -continue norvasc and can restart ACEi  -echo 2015: ef 60% no wall motion abnormalities    History of stroke/carotid stenosis   -restart aspirin and Eliquis at discharge  -reviewed past records and it is not clear why the patient is on Eliquis. She was started on this when she was hospitalized several years ago for TIA in [place].  Presumably she was found to have Afib at that time. Hyperlipidemia  Continue Lipitor 40 mg daily    _______________________________________________________________________  Patient seen and examined by me on discharge day. Pertinent Findings:  Gen:    Not in distress  Chest: Clear lungs  CVS:   Regular rhythm. No edema  Abd:  Soft, not distended, not tender  Neuro:  Alert, Oriented x place, situation, grossly non focal exam  _______________________________________________________________________  DISCHARGE MEDICATIONS:   Current Discharge Medication List      CONTINUE these medications which have NOT CHANGED    Details   oxybutynin chloride XL (DITROPAN XL) 10 mg CR tablet Take 1 Tab by mouth daily. Qty: 30 Tab, Refills: 11    Associated Diagnoses: OAB (overactive bladder)      apixaban (ELIQUIS) 5 mg tablet Take 1 Tab by mouth two (2) times a day. Qty: 60 Tab, Refills: 11    Associated Diagnoses: Cerebrovascular accident (CVA) due to thrombosis of cerebral artery ([facility] [address]      Omeprazole delayed release (PRILOSEC D/R) 20 mg tablet Take 1 Tab by mouth daily. Indications: Heartburn  Qty: 90 Tab, Refills: 3    Associated Diagnoses: Gastroesophageal reflux disease without esophagitis      atorvastatin (LIPITOR) 10 mg tablet TAKE ONE TABLET BY MOUTH ONCE DAILY FOR CHOLESTEROL AND  STROKE  Qty: 90 Tab, Refills: 3    Associated Diagnoses: Cerebrovascular accident (CVA) due to thrombosis of middle cerebral artery, unspecified blood vessel laterality (HCC)      lisinopril (PRINIVIL, ZESTRIL) 30 mg tablet Take 1 Tab by mouth daily.  Indications: pressure  Qty: 90 Tab, Refills: 3    Associated Diagnoses: Essential hypertension with goal blood pressure less than 140/90      amLODIPine (NORVASC) 5 mg tablet Take 1 Tab by mouth daily. Qty: 90 Tab, Refills: 3    Associated Diagnoses: Essential hypertension      aspirin delayed-release 81 mg tablet Take  by mouth daily. Cholecalciferol, Vitamin D3, (VITAMIN D3) 1,000 unit cap Take  by mouth. My Recommended Diet, Activity, Wound Care, and follow-up labs are listed in the patient's Discharge Insturctions which I have personally completed and reviewed.   Risk of deterioration: Low    Condition at Discharge:  Stable  _____________________________________________________________________    Disposition  Home with family and home health services  ____________________________________________________________________    Care Plan discussed with:   Patient, Family, RN, Care Manager, Consultant    ____________________________________________________________________    Code Status: Full Code  ____________________________________________________________________      Condition at Discharge:  Stable  _____________________________________________________________________  Follow up with:   PCP : Yan Pisano MD  Follow-up Information     Follow up With Specialties Details Why Contact Info    Yan Pisano, [address]   [address]  [phone]                Total time in minutes spent coordinating this discharge (includes going over instructions, follow-up, prescriptions, and preparing report for sign off to her PCP) :  35 minutes    Signed:  Sha Lora MD

## 2019-01-30 NOTE — PROGRESS NOTES
Met with pt and called her daughter Ariel Ibanez  regarding pt's COSME. Michael Richardson said she will accompany pt to her home in [facility] and then on Saturday she will drive pt back to her house in [place] [address]. Pt doesn't want to go to SNF. Michael Richardson called her sisters and mother and decided pt will return to home with [facility]. HH/PT referral send to [facility] via [address] [place] [address]. Michael Richardson would like to know pt's d/c time so she can coordinate her travel to [facility]. Notified floor CM to coordinate. Bereket Hua MSW 
ED Case Manager Ext [phone]

## 2019-01-30 NOTE — PROGRESS NOTES
Pt accepted by [address] [place] [address] for services at discharge. Pt family to transport home but may be delayed due to weather conditions in [place]. Pt family still interested in [facility] (SNF), CM will follow up with SNF to see if pt would be accepted. UPDATE 10:05AM 
Pt is now alert and oriented and says that she would prefer to return to her daughters home with home health care. CM to update pt daughter. CM to call Dr. Salvatore Duarte for orders to start home health 48+ hrs after d/c. UPDATE 12:26 CM confirmed with pt daughter that the pt will return to her regular place of residency on  2/2/2019. CM received verbal permission for orders for [place] to start after 2/2/2019. [facility] added to AVS. CM will continue to follow pt for d/c planning. UPDATE 1:59PM 
CM confirmed pt will receive services with [facility] division. Pt daughters will  pt at discharge between 2:30-3:30pm. 
 
G[facility] has completed the discharge planning needs of the pt at this time. Salma Gallardo, Care Manager [phone]

## 2019-01-30 NOTE — PROGRESS NOTES
General Surgery End of Shift Nursing Note Bedside shift change report given to MAGGIE (oncoming nurse) by Ygnacio Apley (offgoing nurse). Report included the following information SBAR, Kardex, Intake/Output, MAR, Recent Results and Med Rec Status. Shift worked:   7p-7a Summary of shift:  Patient recovering well, no pain this shift, up to bathroom several times, voiding well. No concerns Issues for physician to address:   none Zoie Benjamin

## 2019-01-30 NOTE — PROGRESS NOTES
CM contacted by [facility] and asked to change start date of services to 2/5/2019. Shayne Michel, Care Manager [phone]

## 2019-01-30 NOTE — PROGRESS NOTES
Problem: Falls - Risk of 
Goal: *Absence of Falls Document Mike Ruffins Fall Risk and appropriate interventions in the flowsheet. Outcome: Progressing Towards Goal 
Fall Risk Interventions: 
Mobility Interventions: Patient to call before getting OOB Medication Interventions: Patient to call before getting OOB, Teach patient to arise slowly Elimination Interventions: Call light in reach Problem: Pressure Injury - Risk of 
Goal: *Prevention of pressure injury Document Hema Scale and appropriate interventions in the flowsheet. Outcome: Progressing Towards Goal 
Pressure Injury Interventions: 
  
 
Moisture Interventions: Maintain skin hydration (lotion/cream) Activity Interventions: Increase time out of bed Mobility Interventions: Turn and reposition approx. every two hours(pillow and wedges) Nutrition Interventions: Document food/fluid/supplement intake, Offer support with meals,snacks and hydration Friction and Shear Interventions: Minimize layers

## 2019-01-30 NOTE — PROGRESS NOTES
Physical Therapy Goals Revised 1/30/2019 1. Patient will move from supine to sit and sit to supine , scoot up and down and roll side to side in bed with independence within 7 day(s). 2.  Patient will transfer from bed to chair and chair to bed with independence using the least restrictive device within 7 day(s). 3.  Patient will perform sit to stand with independence within 7 day(s). 4.  Patient will ambulate with modified independence for 250 feet with the least restrictive device within 7 day(s). Physical Therapy Goals Initiated 1/27/2019 1. Patient will move from supine to sit and sit to supine  in bed with independence within 7 day(s). 2.  Patient will transfer from bed to chair and chair to bed with supervision/set-up using the least restrictive device within 7 day(s). 3.  Patient will perform sit to stand with independence within 7 day(s). 4.  Patient will ambulate with supervision/set-up for 200 feet with the least restrictive device within 7 day(s). physical Therapy TREATMENT Patient: Earl Lara (80 y.o. female) Date: 1/30/2019 Diagnosis: Kidney Stone Hydronephrosis <principal problem not specified> Procedure(s) (LRB): 
CYSTOSCOPY, RIGHT URETEROSCOPY, LASER STONE FRAGMENTATION, STENT PLACEMENT RIGHT URETER (Right) 1 Day Post-Op Precautions:  falls Chart, physical therapy assessment, plan of care and goals were reviewed. ASSESSMENT: Patient continues to demonstrate mildly impaired functional mobility and balance following cystoscopy, ureteroscopy and stent placement yesterday. Patient requiring only supervision to SBA for transfers and mobility. Gait is slow demonstrating non-functional reanna but steady with no overt LOB on level surfaces. Patient able to ambulate 200 feet today using quad cane for support. Patient lives alone and reports independence at baseline. Still driving short distances.  Patient reports she feels that she is at her baseline for functional mobility. Family lives in [place] and patient will return to daughter's home for a few days prior to returning to her home. Recommend HHPT once patient returns to her home to ensure safe mobility. Progression toward goals: 
[]    Improving appropriately and progressing toward goals [x]    Improving slowly and progressing toward goals 
[]    Not making progress toward goals and plan of care will be adjusted PLAN: 
Patient continues to benefit from skilled intervention to address the above impairments. Continue treatment per established plan of care. Discharge Recommendations:  Home Health with 24 hour supervision initially following discharge Further Equipment Recommendations for Discharge:  Has appropriate equipment SUBJECTIVE:  
Patient stated I'm feeling better.  OBJECTIVE DATA SUMMARY:  
Critical Behavior: 
Neurologic State: Alert Orientation Level: Appropriate for age, Oriented X4 Cognition: Follows commands Safety/Judgement: Awareness of environment, Decreased awareness of need for safety Functional Mobility Training: 
Bed Mobility: 
  
 deferred; upon arrival patient standing in room Transfers: 
Sit to Stand: Stand-by assistance Stand to Sit: Stand-by assistance Balance: 
Sitting: Intact Standing: Impaired Standing - Static: Good; Unsupported Standing - Dynamic : Good(using quad cane on level surface) Ambulation/Gait Training: 
Distance (ft): 200 Feet (ft) Assistive Device: Gait belt;Cane, quad Ambulation - Level of Assistance: Stand-by assistance Gait Abnormalities: Decreased step clearance Base of Support: Widened Speed/Any: Pace decreased (<100 feet/min); Slow Step Length: Left shortened;Right shortened Gait is slow but steady on level surfaces; no overt LOB noted Pain: 
Pain Scale 1: Numeric (0 - 10) Pain Intensity 1: 0 After treatment: [x]    Patient left in no apparent distress sitting up in chair 
[]    Patient left in no apparent distress in bed 
[x]    Call bell left within reach [x]    Nursing notified 
[]    Caregiver present 
[]    Bed alarm activated COMMUNICATION/COLLABORATION:  
The patients plan of care was discussed with: Registered Nurse and  Lev Kraft PT Time Calculation: 24 mins

## 2019-01-30 NOTE — PROGRESS NOTES
General Surgery End of Shift Nursing Note Bedside shift change report given to Kelly Donahue RN (oncoming nurse) by Edin Jenkins RN (offgoing nurse). Report included the following information SBAR, Kardex and Recent Results. Shift worked:   7a-7p Summary of shift:   Received patient at 3pm; No significant events this shift. Issues for physician to address:   [address];   
 
Number times ambulated in hallway past shift: 0 Number of times OOB to chair past shift: 0 Pain Management: 
Current medication: None Patient states pain is manageable on current pain medication: YES 
 
GI: 
 
Current diet:  DIET DENTAL SOFT (SOFT SOLID) Tolerating current diet: YES Passing flatus: YES Last Bowel Movement: Several days ago Appearance: n/a Respiratory: 
 
Incentive Spirometer at bedside: YES Patient instructed on use: YES Patient Safety: 
 
Falls Score: 3 Bed Alarm On? Yes Sitter? No 
 
Klaudia Smiley Simpler

## 2019-01-31 ENCOUNTER — HOME HEALTH ADMISSION (OUTPATIENT)
Dept: HOME HEALTH SERVICES | Facility: HOME HEALTH | Age: 84
End: 2019-01-31

## 2019-02-05 ENCOUNTER — HOME CARE VISIT (OUTPATIENT)
Dept: HOME HEALTH SERVICES | Facility: HOME HEALTH | Age: 84
End: 2019-02-05

## 2019-02-07 ENCOUNTER — HOME CARE VISIT (OUTPATIENT)
Dept: HOME HEALTH SERVICES | Facility: HOME HEALTH | Age: 84
End: 2019-02-07

## 2019-02-07 NOTE — PROGRESS NOTES
Pt was scheduled for appt 2/6/19. Did not show. .called pt is [place] with her daughters Will call for appt when she gets home

## 2019-04-12 ENCOUNTER — HOME HEALTH ADMISSION (OUTPATIENT)
Dept: HOME HEALTH SERVICES | Facility: HOME HEALTH | Age: 84
End: 2019-04-12
Payer: MEDICARE

## 2019-04-15 ENCOUNTER — HOME CARE VISIT (OUTPATIENT)
Dept: SCHEDULING | Facility: HOME HEALTH | Age: 84
End: 2019-04-15
Payer: MEDICARE

## 2019-04-15 VITALS
OXYGEN SATURATION: 95 % | HEART RATE: 57 BPM | SYSTOLIC BLOOD PRESSURE: 135 MMHG | TEMPERATURE: 98.8 F | DIASTOLIC BLOOD PRESSURE: 83 MMHG

## 2019-04-15 PROCEDURE — 3331090001 HH PPS REVENUE CREDIT

## 2019-04-15 PROCEDURE — 400013 HH SOC

## 2019-04-15 PROCEDURE — G0151 HHCP-SERV OF PT,EA 15 MIN: HCPCS

## 2019-04-15 PROCEDURE — 3331090002 HH PPS REVENUE DEBIT

## 2019-04-15 NOTE — PROGRESS NOTES
Armando Marte- thanks for the update. Medication clarification:  
Amlodipine is 5mg daily (1 tab) Tamsulosin is probably not needed since she had her stone broken up, would not take/order that for now, did not see that in Dr. Radha Busby note (urology). Aspirin 81mg can be picked up OTC and taken 1 daily for stroke prevention Docusate can be discontinued Oxybutynin is for symptomatic relief of overactive bladder, if she is getting good symptom relief without side effects (bothersome dry mouth, blurry vision) with 10mg once daily, ok to continue that dose. Will defer percocet, doesn't need that at this time.

## 2019-04-15 NOTE — Clinical Note
Dr Antonio Denny, Leana Fairbanks was admitted to home health PT services 4/15/19. Pt reports increased L hip pain beginning on Friday, 4/12/19. No edema, heat, or redness noted. No s/s infection or DVT. Pt is tender to palpation over L femoral head, denies trauma or fall. May be indicative of bursitis. Upon medication reconciliation, pt was noted to have med list from discharge from therapy on 3/14/19  which is differs from pt's home meds. The following discrepencies were noted: 1. Atorvastatin is listed as 40mg  nightly. Pt has 10mg tablets in the home, and taking 10 mg nightly 2. Tamsulosin . 4mg oral capsule. Pt does not have in the home. 3. Amlodipine 5mg , 2  tabs daily. Pt is taking 1 tab daily 4. Aspirin 81mg daily. Pt does not have in the home. 5. Docusate-senna 50/8.6. Pt does not have in the home. 6. Oxybutynin 5 mg BID. Pt is taking 10mg one time a day. 7. Pt has Percocet listed but does not have in the home. Please advise correct dosing and if you would like her to continue the medications she does not have in the home. Thank you, Virgen Mayo, PT,DPT, C/NDT

## 2019-04-16 PROCEDURE — 3331090002 HH PPS REVENUE DEBIT

## 2019-04-16 PROCEDURE — 3331090001 HH PPS REVENUE CREDIT

## 2019-04-17 PROCEDURE — 3331090001 HH PPS REVENUE CREDIT

## 2019-04-17 PROCEDURE — 3331090002 HH PPS REVENUE DEBIT

## 2019-04-18 PROCEDURE — 3331090002 HH PPS REVENUE DEBIT

## 2019-04-18 PROCEDURE — 3331090001 HH PPS REVENUE CREDIT

## 2019-04-19 ENCOUNTER — HOME CARE VISIT (OUTPATIENT)
Dept: SCHEDULING | Facility: HOME HEALTH | Age: 84
End: 2019-04-19
Payer: MEDICARE

## 2019-04-19 PROCEDURE — G0157 HHC PT ASSISTANT EA 15: HCPCS

## 2019-04-19 PROCEDURE — 3331090001 HH PPS REVENUE CREDIT

## 2019-04-19 PROCEDURE — 3331090002 HH PPS REVENUE DEBIT

## 2019-04-20 PROCEDURE — 3331090002 HH PPS REVENUE DEBIT

## 2019-04-20 PROCEDURE — 3331090001 HH PPS REVENUE CREDIT

## 2019-04-21 PROCEDURE — 3331090002 HH PPS REVENUE DEBIT

## 2019-04-21 PROCEDURE — 3331090001 HH PPS REVENUE CREDIT

## 2019-04-22 PROCEDURE — 3331090002 HH PPS REVENUE DEBIT

## 2019-04-22 PROCEDURE — 3331090001 HH PPS REVENUE CREDIT

## 2019-04-23 VITALS — HEART RATE: 72 BPM | SYSTOLIC BLOOD PRESSURE: 138 MMHG | DIASTOLIC BLOOD PRESSURE: 68 MMHG | OXYGEN SATURATION: 98 %

## 2019-04-23 PROCEDURE — 3331090001 HH PPS REVENUE CREDIT

## 2019-04-23 PROCEDURE — 3331090002 HH PPS REVENUE DEBIT

## 2019-04-24 ENCOUNTER — HOME CARE VISIT (OUTPATIENT)
Dept: SCHEDULING | Facility: HOME HEALTH | Age: 84
End: 2019-04-24
Payer: MEDICARE

## 2019-04-24 PROCEDURE — 3331090002 HH PPS REVENUE DEBIT

## 2019-04-24 PROCEDURE — G0157 HHC PT ASSISTANT EA 15: HCPCS

## 2019-04-24 PROCEDURE — 3331090001 HH PPS REVENUE CREDIT

## 2019-04-25 PROCEDURE — 3331090002 HH PPS REVENUE DEBIT

## 2019-04-25 PROCEDURE — 3331090001 HH PPS REVENUE CREDIT

## 2019-04-26 ENCOUNTER — HOME CARE VISIT (OUTPATIENT)
Dept: HOME HEALTH SERVICES | Facility: HOME HEALTH | Age: 84
End: 2019-04-26
Payer: MEDICARE

## 2019-04-26 ENCOUNTER — HOME CARE VISIT (OUTPATIENT)
Dept: SCHEDULING | Facility: HOME HEALTH | Age: 84
End: 2019-04-26
Payer: MEDICARE

## 2019-04-26 PROCEDURE — G0157 HHC PT ASSISTANT EA 15: HCPCS

## 2019-04-26 PROCEDURE — 3331090001 HH PPS REVENUE CREDIT

## 2019-04-26 PROCEDURE — 3331090002 HH PPS REVENUE DEBIT

## 2019-04-27 PROCEDURE — 3331090001 HH PPS REVENUE CREDIT

## 2019-04-27 PROCEDURE — 3331090002 HH PPS REVENUE DEBIT

## 2019-04-28 PROCEDURE — 3331090002 HH PPS REVENUE DEBIT

## 2019-04-28 PROCEDURE — 3331090001 HH PPS REVENUE CREDIT

## 2019-04-29 PROCEDURE — 3331090001 HH PPS REVENUE CREDIT

## 2019-04-29 PROCEDURE — 3331090002 HH PPS REVENUE DEBIT

## 2019-04-30 PROCEDURE — 3331090002 HH PPS REVENUE DEBIT

## 2019-04-30 PROCEDURE — 3331090001 HH PPS REVENUE CREDIT

## 2019-05-01 ENCOUNTER — HOME CARE VISIT (OUTPATIENT)
Dept: SCHEDULING | Facility: HOME HEALTH | Age: 84
End: 2019-05-01
Payer: MEDICARE

## 2019-05-01 VITALS — DIASTOLIC BLOOD PRESSURE: 78 MMHG | SYSTOLIC BLOOD PRESSURE: 136 MMHG

## 2019-05-01 PROCEDURE — G0157 HHC PT ASSISTANT EA 15: HCPCS

## 2019-05-01 PROCEDURE — 3331090001 HH PPS REVENUE CREDIT

## 2019-05-01 PROCEDURE — 3331090002 HH PPS REVENUE DEBIT

## 2019-05-02 PROCEDURE — 3331090002 HH PPS REVENUE DEBIT

## 2019-05-02 PROCEDURE — 3331090001 HH PPS REVENUE CREDIT

## 2019-05-03 ENCOUNTER — HOME CARE VISIT (OUTPATIENT)
Dept: SCHEDULING | Facility: HOME HEALTH | Age: 84
End: 2019-05-03
Payer: MEDICARE

## 2019-05-03 PROCEDURE — 3331090002 HH PPS REVENUE DEBIT

## 2019-05-03 PROCEDURE — G0151 HHCP-SERV OF PT,EA 15 MIN: HCPCS

## 2019-05-03 PROCEDURE — 3331090001 HH PPS REVENUE CREDIT

## 2019-05-04 PROCEDURE — 3331090001 HH PPS REVENUE CREDIT

## 2019-05-04 PROCEDURE — 3331090002 HH PPS REVENUE DEBIT

## 2019-05-05 PROCEDURE — 3331090001 HH PPS REVENUE CREDIT

## 2019-05-05 PROCEDURE — 3331090002 HH PPS REVENUE DEBIT

## 2019-05-06 VITALS
DIASTOLIC BLOOD PRESSURE: 79 MMHG | OXYGEN SATURATION: 96 % | SYSTOLIC BLOOD PRESSURE: 122 MMHG | TEMPERATURE: 99.3 F | HEART RATE: 68 BPM

## 2019-05-06 PROCEDURE — 3331090001 HH PPS REVENUE CREDIT

## 2019-05-06 PROCEDURE — 3331090002 HH PPS REVENUE DEBIT

## 2019-10-15 PROBLEM — N18.30 CKD (CHRONIC KIDNEY DISEASE) STAGE 3, GFR 30-59 ML/MIN (HCC): Status: ACTIVE | Noted: 2019-10-15

## 2020-11-09 PROBLEM — I73.9 PERIPHERAL VASCULAR DISEASE (HCC): Status: ACTIVE | Noted: 2020-11-09

## 2021-03-08 ENCOUNTER — OFFICE VISIT (OUTPATIENT)
Dept: FAMILY MEDICINE CLINIC | Age: 86
End: 2021-03-08
Payer: MEDICARE

## 2021-03-08 VITALS
SYSTOLIC BLOOD PRESSURE: 156 MMHG | OXYGEN SATURATION: 98 % | BODY MASS INDEX: 30.23 KG/M2 | HEIGHT: 67 IN | HEART RATE: 64 BPM | DIASTOLIC BLOOD PRESSURE: 78 MMHG | TEMPERATURE: 97.7 F | RESPIRATION RATE: 18 BRPM | WEIGHT: 192.6 LBS

## 2021-03-08 DIAGNOSIS — K92.2 GASTROINTESTINAL HEMORRHAGE, UNSPECIFIED GASTROINTESTINAL HEMORRHAGE TYPE: ICD-10-CM

## 2021-03-08 DIAGNOSIS — G45.9 TIA (TRANSIENT ISCHEMIC ATTACK): Primary | ICD-10-CM

## 2021-03-08 DIAGNOSIS — I10 ESSENTIAL HYPERTENSION: ICD-10-CM

## 2021-03-08 DIAGNOSIS — N18.30 STAGE 3 CHRONIC KIDNEY DISEASE, UNSPECIFIED WHETHER STAGE 3A OR 3B CKD (HCC): ICD-10-CM

## 2021-03-08 DIAGNOSIS — N32.81 OAB (OVERACTIVE BLADDER): ICD-10-CM

## 2021-03-08 DIAGNOSIS — I63.319 CEREBROVASCULAR ACCIDENT (CVA) DUE TO THROMBOSIS OF MIDDLE CEREBRAL ARTERY, UNSPECIFIED BLOOD VESSEL LATERALITY (HCC): ICD-10-CM

## 2021-03-08 DIAGNOSIS — Z12.31 ENCOUNTER FOR SCREENING MAMMOGRAM FOR MALIGNANT NEOPLASM OF BREAST: ICD-10-CM

## 2021-03-08 DIAGNOSIS — I65.23 BILATERAL CAROTID ARTERY STENOSIS: ICD-10-CM

## 2021-03-08 DIAGNOSIS — D50.0 IRON DEFICIENCY ANEMIA DUE TO CHRONIC BLOOD LOSS: ICD-10-CM

## 2021-03-08 DIAGNOSIS — K21.9 GASTROESOPHAGEAL REFLUX DISEASE WITHOUT ESOPHAGITIS: ICD-10-CM

## 2021-03-08 DIAGNOSIS — E78.00 PURE HYPERCHOLESTEROLEMIA: ICD-10-CM

## 2021-03-08 DIAGNOSIS — N64.4 PAIN OF RIGHT BREAST: ICD-10-CM

## 2021-03-08 DIAGNOSIS — Z86.73 HISTORY OF STROKE: ICD-10-CM

## 2021-03-08 PROCEDURE — G8427 DOCREV CUR MEDS BY ELIG CLIN: HCPCS | Performed by: FAMILY MEDICINE

## 2021-03-08 PROCEDURE — 99214 OFFICE O/P EST MOD 30 MIN: CPT | Performed by: FAMILY MEDICINE

## 2021-03-08 PROCEDURE — 1101F PT FALLS ASSESS-DOCD LE1/YR: CPT | Performed by: FAMILY MEDICINE

## 2021-03-08 PROCEDURE — G8417 CALC BMI ABV UP PARAM F/U: HCPCS | Performed by: FAMILY MEDICINE

## 2021-03-08 PROCEDURE — G8510 SCR DEP NEG, NO PLAN REQD: HCPCS | Performed by: FAMILY MEDICINE

## 2021-03-08 PROCEDURE — 1090F PRES/ABSN URINE INCON ASSESS: CPT | Performed by: FAMILY MEDICINE

## 2021-03-08 PROCEDURE — G8536 NO DOC ELDER MAL SCRN: HCPCS | Performed by: FAMILY MEDICINE

## 2021-03-08 RX ORDER — ASPIRIN 81 MG/1
81 TABLET ORAL DAILY
Qty: 100 TAB | Refills: 3 | Status: SHIPPED | OUTPATIENT
Start: 2021-03-08 | End: 2022-06-15 | Stop reason: SDUPTHER

## 2021-03-08 RX ORDER — PHENOL/SODIUM PHENOLATE
20 AEROSOL, SPRAY (ML) MUCOUS MEMBRANE DAILY
Qty: 90 TAB | Refills: 3 | Status: SHIPPED | OUTPATIENT
Start: 2021-03-08 | End: 2022-03-04 | Stop reason: SDUPTHER

## 2021-03-08 RX ORDER — OXYBUTYNIN CHLORIDE 15 MG/1
TABLET, EXTENDED RELEASE ORAL
Qty: 90 TAB | Refills: 3 | Status: SHIPPED | OUTPATIENT
Start: 2021-03-08 | End: 2022-04-25

## 2021-03-08 NOTE — PROGRESS NOTES
Chief Complaint   Patient presents with    Breast pain     (R) breast pain. States had a oblong sore pain site to (R) breast. Pain has since left    Foot Pain     Reports bunion to (R) foot. Wants to discuss options  for OTC treatment    Peripheral Edema     States had pringles on Saturday and now LE is swollen. Dwayne Venegas is a 80 y.o. female and presents for annual Medicare Wellness Visit. Pain Scale: /10  Pain Location:     1. Have you been to the ER, urgent care clinic since your last visit? Hospitalized since your last visit? Yes er in [place]   2. Have you seen or consulted any other health care providers outside of the [address] [place] [address] since your last visit? Include any pap smears or colon screening. No    HPI:  F/U TIA/CVA  Doing well on ASA 81mg every day, eliquis 2.5 BID. Remains on BP pills (norvasc 5mg/d) and statin lipitor to 10mg/d. No orthostasis sx since last visit. No syncope, no falls, no vision change lately. CTA 10/2015 showed minimal atherosclerosis. No facial droop, no change in hand clumsiness (R hand a little weaker than left after last stroke). On pepcid and prilosec due to breakthru GI bleeding while on just PPI. Lab Results   Component Value Date/Time    Hemoglobin (POC) 12.9 10/22/2015 04:59 PM    HGB 12.5 07/06/2020 01:48 PM     Hypertension. Blood pressures up today, prev have been great. Management at last visit at included con't current regimen norvasc 5mg daily. Current regimen: calcium channel blocker. Symptoms include no symptoms. Patient denies chest pain, palpitations, headache.  Last GFR close to baseline to a little worse, was a little pre-renal.  Lab review:   Lab Results   Component Value Date/Time    Sodium 141 07/06/2020 01:48 PM    Potassium 4.2 07/06/2020 01:48 PM    Chloride 107 (H) 07/06/2020 01:48 PM    CO2 26 07/06/2020 01:48 PM    Anion gap 6 03/12/2020 08:30 PM    Glucose 144 (H) 07/06/2020 01:48 PM    BUN 16 07/06/2020 01:48 PM Creatinine 1.05 (H) 07/06/2020 01:48 PM    BUN/Creatinine ratio 15 07/06/2020 01:48 PM    GFR est AA 56 (L) 07/06/2020 01:48 PM    GFR est non-AA 48 (L) 07/06/2020 01:48 PM    Calcium 9.3 07/06/2020 01:48 PM     Hyperlipidemia. On lipitor 10mg now. Judd well. No myalgias, arthralgias, unusual weakness. Lab Results   Component Value Date/Time    Cholesterol, total 110 07/06/2020 01:48 PM    HDL Cholesterol 75 07/06/2020 01:48 PM    LDL, calculated 16 07/06/2020 01:48 PM    VLDL, calculated 19 07/06/2020 01:48 PM    Triglyceride 95 07/06/2020 01:48 PM    CHOL/HDL Ratio 1.4 10/23/2015 03:06 AM     Lab Results   Component Value Date/Time    ALT (SGPT) 15 03/12/2020 08:30 PM    Alk. phosphatase 95 03/12/2020 08:30 PM    Bilirubin, total 0.3 03/12/2020 08:30 PM     OAB  Saw GYN Dr. Marcel Schlatter at [facility] in [place]. Doing well on oxybutynin XL 15 mg daily, judd well. PMH, SH, Medications/Allergies: reviewed, on chart. ROS:  Constitutional: No fever, chills or weight loss  Respiratory: No cough, SOB   CV: No chest pain or Palpitations    Visit Vitals  BP (!) 156/78 (BP 1 Location: Left upper arm, BP Patient Position: Sitting, BP Cuff Size: Adult long)   Pulse 64   Temp 97.7 °F (36.5 °C) (Oral)   Resp 18   Ht 5' 7\" (1.702 m)   Wt 192 lb 9.6 oz (87.4 kg)   SpO2 98%   BMI 30.17 kg/m²     Wt Readings from Last 3 Encounters:   03/08/21 192 lb 9.6 oz (87.4 kg)   11/09/20 192 lb (87.1 kg)   07/06/20 192 lb 12.8 oz (87.5 kg)   -0#  BP Readings from Last 3 Encounters:   03/08/21 (!) 156/78   11/09/20 138/82   07/06/20 130/80     Physical Examination: General appearance - alert, well appearing, and in no distress  Mental status - alert, oriented to person, place, and time  Eyes - pupils equal and reactive, extraocular eye movements intact. L lower lid with 2 small skin tags, pt reports itchy and bothersome.   ENT - bilateral external ears and nose normal. Normal lips  Neck - supple, no significant adenopathy, no thyromegaly or mass. Lymphatics - no palpable lymphadenopathy, no hepatosplenomegaly  Chest - clear to auscultation, no wheezes, rales or rhonchi, symmetric air entry  Heart - normal rate, regular rhythm, normal S1, S2, no murmurs, rubs, clicks or gallops  Extremities - peripheral pulses normal, trace pedal edema, no clubbing or cyanosis. A/P:  TIA/HLD/Carotid stenosis/anticoagulation  Doing well. Con't eliquis, statin, ASA. Plan to con't ASA 81mg and eliquis together indefinitely, provided pt has no bleeding problems. Plan Recheck CMP and lipids spring 2021. HTN and CKD  Pressure up today, prev ok. Monitor BP's. Con't current regimen, recheck GFRaa in BMP. Stay hydrated, miguel in hot weather. Hyperglycemia  Doing ok lately. Last A1c ok. Will con't to monitor periodically, need to keep wt down. Recheck in 6-12mo, tx PRN. Lab Results   Component Value Date/Time    Hemoglobin A1c 6.1 (H) 07/06/2020 01:48 PM     GERD and GI bleed prophy  con't prilosec, is on double anticoag indefinitely. Check H/H, iron 6-12mo    Overweight  Stable. Work on diet. Breast pain ((R) breast pain. States had a oblong sore pain site to (R) breast.  No trauma. Exam today shows no mass or lesion, no surface color change. Suspect duct ectasia. Check mammo, use warm compresses PRN.      F/U 3mo /PRN

## 2021-03-08 NOTE — PROGRESS NOTES
Kalie Lopez is a 87 y.o. female presenting for/with:    Breast pain ((R) breast pain. States had a oblong sore pain site to (R) breast. Pain has since left), Foot Pain (Reports bunion to (R) foot. Wants to discuss options  for OTC treatment), and Peripheral Edema (States had pringles on Saturday and now LE is swollen. )      Visit Vitals  BP (!) 156/78 (BP 1 Location: Left upper arm, BP Patient Position: Sitting, BP Cuff Size: Adult long)   Pulse 64   Temp 97.7 °F (36.5 °C) (Oral)   Resp 18   Ht 5' 7\" (1.702 m)   Wt 192 lb 9.6 oz (87.4 kg)   SpO2 98%   BMI 30.17 kg/m²     Pain Scale: 0 - No pain/10  Pain Location:     1. Have you been to the ER, urgent care clinic since your last visit?  Hospitalized since your last visit?  NO    2. Have you seen or consulted any other health care providers outside of the [facility] since your last visit?  Include any pap smears or colon screening. NO    Symptom review:  NO  Fever   NO  Shaking chills  NO  Cough  NO  Body aches  NO  Coughing up blood  NO  Chest congestion  NO  Chest pain  NO  Shortness of breath  NO  Profound Loss of smell/taste  NO  Nausea/Vomiting   NO  Loose stool/Diarrhea  NO  any skin issues    Patient Risk Factors Reviewed as follows:  NO  have you been in Close contact with confirmed COVID19 patient   NO  History of recent travel to affected geographical areas within the past 14 days  NO  COPD  NO  Active Cancer/Leukemia/Lymphoma/Chemotherapy  NO  Oral steroid use  NO  Pregnant  NO  Diabetes Mellitus  YES  Heart disease  NO  Asthma  NO Health care worker at home  NO Health care worker  NO Is there a Pregnant Woman in the home  NO Dialysis pt in the home   NO a large number of people living in the home    Learning Assessment 3/8/2021   PRIMARY LEARNER Patient   PRIMARY LANGUAGE ENGLISH   LEARNER PREFERENCE PRIMARY READING   ANSWERED BY patient   RELATIONSHIP SELF     Fall Risk Assessment, last 12 mths 3/8/2021   Able to walk? Yes   Fall in  past 12 months? 1   Do you feel unsteady? 0   Are you worried about falling 0   Is the gait abnormal? 0   Number of falls in past 12 months 1   Fall with injury? 0       3 most recent PHQ Screens 3/8/2021   PHQ Not Done -   Little interest or pleasure in doing things Not at all   Feeling down, depressed, irritable, or hopeless Not at all   Total Score PHQ 2 0     Abuse Screening Questionnaire 3/8/2021   Do you ever feel afraid of your partner? N   Are you in a relationship with someone who physically or mentally threatens you? N   Is it safe for you to go home?  Y       ADL Assessment 3/8/2021   Feeding yourself No Help Needed   Getting from bed to chair No Help Needed   Getting dressed No Help Needed   Bathing or showering No Help Needed   Walk across the room (includes cane/walker) No Help Needed   Using the telphone No Help Needed   Taking your medications No Help Needed   Preparing meals No Help Needed   Managing money (expenses/bills) No Help Needed   Moderately strenuous housework (laundry) No Help Needed   Shopping for personal items (toiletries/medicines) No Help Needed   Shopping for groceries No Help Needed   Driving No Help Needed   Climbing a flight of stairs No Help Needed   Getting to places beyond walking distances No Help Needed

## 2021-03-09 LAB
ALBUMIN SERPL-MCNC: 3.6 G/DL (ref 3.5–5)
ALBUMIN/GLOB SERPL: 1.1 {RATIO} (ref 1.1–2.2)
ALP SERPL-CCNC: 98 U/L (ref 45–117)
ALT SERPL-CCNC: 16 U/L (ref 12–78)
ANION GAP SERPL CALC-SCNC: 6 MMOL/L (ref 5–15)
AST SERPL-CCNC: 13 U/L (ref 15–37)
BILIRUB SERPL-MCNC: 0.4 MG/DL (ref 0.2–1)
BUN SERPL-MCNC: 18 MG/DL (ref 6–20)
BUN/CREAT SERPL: 20 (ref 12–20)
CALCIUM SERPL-MCNC: 8.9 MG/DL (ref 8.5–10.1)
CHLORIDE SERPL-SCNC: 107 MMOL/L (ref 97–108)
CHOLEST SERPL-MCNC: 132 MG/DL
CO2 SERPL-SCNC: 29 MMOL/L (ref 21–32)
CREAT SERPL-MCNC: 0.88 MG/DL (ref 0.55–1.02)
GLOBULIN SER CALC-MCNC: 3.3 G/DL (ref 2–4)
GLUCOSE SERPL-MCNC: 81 MG/DL (ref 65–100)
HDLC SERPL-MCNC: 104 MG/DL
HDLC SERPL: 1.3 {RATIO} (ref 0–5)
LDLC SERPL CALC-MCNC: 17.4 MG/DL (ref 0–100)
LIPID PROFILE,FLP: NORMAL
POTASSIUM SERPL-SCNC: 3.9 MMOL/L (ref 3.5–5.1)
PROT SERPL-MCNC: 6.9 G/DL (ref 6.4–8.2)
SODIUM SERPL-SCNC: 142 MMOL/L (ref 136–145)
TRIGL SERPL-MCNC: 53 MG/DL (ref ?–150)
VLDLC SERPL CALC-MCNC: 10.6 MG/DL

## 2021-04-14 ENCOUNTER — TELEPHONE (OUTPATIENT)
Dept: FAMILY MEDICINE CLINIC | Age: 86
End: 2021-04-14

## 2021-04-14 NOTE — TELEPHONE ENCOUNTER
----- Message from [address] [facility] sent at 4/14/2021 12:37 PM EDT -----  Regarding: Dr. Daphnie Esteban telephone  General Message/Vendor Calls    Caller's first and last name:Kalie Olivo, daughter      Reason for call: results       Callback required yes/no and why: yes       Best contact number(s): [phone]      Details to clarify the request:She would like to be contacted to discuss [facility], specifically the ones relating to chronic blood loss and kidney disease. She stated she also has been keeping track of patient's blood pressure and would like to give the results to Dr. Oscar Bowers.       [address] [facility] [facility]

## 2021-04-14 NOTE — TELEPHONE ENCOUNTER
Call to patients daughter she reviewed her moms after visit summary and has questions for Dr. 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 She is unsure of these 2 diagnosis. Please call daughter Pablo Joseph on [employer] form at [phone].

## 2021-04-14 NOTE — TELEPHONE ENCOUNTER
----- Message from [address] [employer] [address] sent at 4/14/2021 12:50 PM EDT -----  Regarding: Update Medical Information  Contact: [phone]  Rad Su blood pressure was up at her last visit. It was suggested that she keep track of her blood pressure at home, she did and these are the results:  3/9/2021  131/71/79;   3/10/2021  133/73/78;  3/11/2021  133/62/81;  3/12/2021  130/73/75;  3/13/2021  132/73/74;  3/14/2021  137/69/71;  3/15/2021  128/70/77;  3/16/2021  126/62/70.

## 2021-06-05 ENCOUNTER — HOSPITAL ENCOUNTER (OUTPATIENT)
Dept: MAMMOGRAPHY | Age: 86
Discharge: HOME | End: 2021-06-05
Attending: FAMILY MEDICINE
Payer: MEDICARE

## 2021-06-05 DIAGNOSIS — Z12.31 ENCOUNTER FOR SCREENING MAMMOGRAM FOR MALIGNANT NEOPLASM OF BREAST: ICD-10-CM

## 2021-06-05 PROCEDURE — 77063 BREAST TOMOSYNTHESIS BI: CPT

## 2021-06-07 ENCOUNTER — OFFICE VISIT (OUTPATIENT)
Dept: FAMILY MEDICINE CLINIC | Age: 86
End: 2021-06-07
Payer: MEDICARE

## 2021-06-07 VITALS
RESPIRATION RATE: 16 BRPM | BODY MASS INDEX: 30.32 KG/M2 | SYSTOLIC BLOOD PRESSURE: 122 MMHG | WEIGHT: 193.2 LBS | DIASTOLIC BLOOD PRESSURE: 66 MMHG | HEART RATE: 83 BPM | TEMPERATURE: 98.1 F | OXYGEN SATURATION: 95 % | HEIGHT: 67 IN

## 2021-06-07 DIAGNOSIS — I73.9 PERIPHERAL VASCULAR DISEASE (HCC): ICD-10-CM

## 2021-06-07 DIAGNOSIS — Z86.73 HISTORY OF STROKE: ICD-10-CM

## 2021-06-07 DIAGNOSIS — G45.9 TIA (TRANSIENT ISCHEMIC ATTACK): ICD-10-CM

## 2021-06-07 DIAGNOSIS — E78.00 PURE HYPERCHOLESTEROLEMIA: ICD-10-CM

## 2021-06-07 DIAGNOSIS — Z79.01 ANTICOAGULATED: ICD-10-CM

## 2021-06-07 DIAGNOSIS — I10 ESSENTIAL HYPERTENSION: ICD-10-CM

## 2021-06-07 DIAGNOSIS — K21.9 GASTROESOPHAGEAL REFLUX DISEASE WITHOUT ESOPHAGITIS: ICD-10-CM

## 2021-06-07 DIAGNOSIS — I63.319 CEREBROVASCULAR ACCIDENT (CVA) DUE TO THROMBOSIS OF MIDDLE CEREBRAL ARTERY, UNSPECIFIED BLOOD VESSEL LATERALITY (HCC): Primary | ICD-10-CM

## 2021-06-07 LAB — HGB BLD-MCNC: 11 G/DL

## 2021-06-07 PROCEDURE — 1101F PT FALLS ASSESS-DOCD LE1/YR: CPT | Performed by: FAMILY MEDICINE

## 2021-06-07 PROCEDURE — G8432 DEP SCR NOT DOC, RNG: HCPCS | Performed by: FAMILY MEDICINE

## 2021-06-07 PROCEDURE — G8417 CALC BMI ABV UP PARAM F/U: HCPCS | Performed by: FAMILY MEDICINE

## 2021-06-07 PROCEDURE — G8427 DOCREV CUR MEDS BY ELIG CLIN: HCPCS | Performed by: FAMILY MEDICINE

## 2021-06-07 PROCEDURE — 1090F PRES/ABSN URINE INCON ASSESS: CPT | Performed by: FAMILY MEDICINE

## 2021-06-07 PROCEDURE — 99214 OFFICE O/P EST MOD 30 MIN: CPT | Performed by: FAMILY MEDICINE

## 2021-06-07 PROCEDURE — G8536 NO DOC ELDER MAL SCRN: HCPCS | Performed by: FAMILY MEDICINE

## 2021-06-07 PROCEDURE — 85018 HEMOGLOBIN: CPT | Performed by: FAMILY MEDICINE

## 2021-06-07 RX ORDER — ATORVASTATIN CALCIUM 10 MG/1
TABLET, FILM COATED ORAL
Qty: 90 TABLET | Refills: 3 | Status: SHIPPED | OUTPATIENT
Start: 2021-06-07 | End: 2021-06-28

## 2021-06-07 NOTE — PROGRESS NOTES
Chief Complaint   Patient presents with    Leg Problem     right leg gives way     Jennifer Thomas is a 80 y.o. female    1. Have you been to the ER, urgent care clinic since your last visit? Hospitalized since your last visit? Yes er in [place]   2. Have you seen or consulted any other health care providers outside of the [address] [place] [address] since your last visit? Include any pap smears or colon screening. No    HPI:  F/U TIA/CVA  Doing well on ASA 81mg every day, eliquis 2.5 BID. Remains on BP pills (norvasc 5mg/d) and statin lipitor to 10mg/d. No orthostasis sx since last visit. No syncope, no falls, no vision change lately. CTA 10/2015 showed minimal atherosclerosis. No facial droop, no change in hand clumsiness (R hand a little weaker than left after last stroke), and does have R foot weakness lately. occ feels like leg is going out on her. Uses cane most of the time, helps. On pepcid and prilosec due to breakthru GI bleeding while on just PPI. Lab Results   Component Value Date/Time    Hemoglobin (POC) 12.9 10/22/2015 04:59 PM    HGB 12.5 07/06/2020 01:48 PM     Hypertension. Blood pressures up today, prev have been great. Management at last visit at included con't current regimen norvasc 5mg daily. Current regimen: calcium channel blocker. Symptoms include no symptoms. Patient denies chest pain, palpitations, headache. Last GFR close to baseline to a little worse, was a little pre-renal.  Lab review:   Lab Results   Component Value Date/Time    Sodium 142 03/08/2021 11:30 AM    Potassium 3.9 03/08/2021 11:30 AM    Chloride 107 03/08/2021 11:30 AM    CO2 29 03/08/2021 11:30 AM    Anion gap 6 03/08/2021 11:30 AM    Glucose 81 03/08/2021 11:30 AM    BUN 18 03/08/2021 11:30 AM    Creatinine 0.88 03/08/2021 11:30 AM    BUN/Creatinine ratio 20 03/08/2021 11:30 AM    GFR est AA >60 03/08/2021 11:30 AM    GFR est non-AA >60 03/08/2021 11:30 AM    Calcium 8.9 03/08/2021 11:30 AM     Hyperlipidemia.   On lipitor 10mg now. Judd well. No myalgias, arthralgias, unusual weakness. Lab Results   Component Value Date/Time    Cholesterol, total 132 03/08/2021 11:30 AM    HDL Cholesterol 104 03/08/2021 11:30 AM    LDL, calculated 17.4 03/08/2021 11:30 AM    VLDL, calculated 10.6 03/08/2021 11:30 AM    Triglyceride 53 03/08/2021 11:30 AM    CHOL/HDL Ratio 1.3 03/08/2021 11:30 AM     Lab Results   Component Value Date/Time    ALT (SGPT) 16 03/08/2021 11:30 AM    Alk. phosphatase 98 03/08/2021 11:30 AM    Bilirubin, total 0.4 03/08/2021 11:30 AM     OAB  Saw GYN Dr. Beau Kim at [facility] in [place]. Doing well on oxybutynin XL 15 mg daily, judd well. PMH, SH, Medications/Allergies: reviewed, on chart. ROS:  Constitutional: No fever, chills or weight loss  Respiratory: No cough, SOB   CV: No chest pain or Palpitations    Visit Vitals  /66 (BP 1 Location: Right arm)   Pulse 83   Temp 98.1 °F (36.7 °C) (Temporal)   Resp 16   Ht 5' 7\" (1.702 m)   Wt 193 lb 3.2 oz (87.6 kg)   SpO2 95%   BMI 30.26 kg/m²     +1#  Wt Readings from Last 3 Encounters:   06/07/21 193 lb 3.2 oz (87.6 kg)   03/08/21 192 lb 9.6 oz (87.4 kg)   11/09/20 192 lb (87.1 kg)     BP Readings from Last 3 Encounters:   06/07/21 122/66   03/08/21 (!) 156/78   11/09/20 138/82     Physical Examination: General appearance - alert, well appearing, and in no distress  Mental status - alert, oriented to person, place, and time  Eyes - pupils equal and reactive, extraocular eye movements intact. L lower lid with 2 small skin tags, pt reports itchy and bothersome. ENT - bilateral external ears and nose normal. Normal lips  Neck - supple, no significant adenopathy, no thyromegaly or mass.   Lymphatics - no palpable lymphadenopathy, no hepatosplenomegaly  Chest - clear to auscultation, no wheezes, rales or rhonchi, symmetric air entry  Heart - normal rate, regular rhythm, normal S1, S2, no murmurs, rubs, clicks or gallops  Extremities - peripheral pulses normal, trace pedal edema, no clubbing or cyanosis. Neuro- CN 2-12 intact to test, nl str, coordination and ROM UE and LE bilat. Results for orders placed or performed in visit on 21   AMB POC HEMOGLOBIN (HGB)   Result Value Ref Range    Hemoglobin (POC) 11.0 G/DL     A/P:  TIA/HLD/Carotid stenosis/anticoagulation  Doing well. Con't eliquis, statin, ASA. Plan to con't ASA 81mg and eliquis together indefinitely, provided pt has no bleeding problems. Plan Recheck CMP and lipids 2021. HGB ok today, no signs of anemia. HTN and CKD  Pressure better today, prev ok. Monitor BP's. Con't current regimen, plan recheck Marisela Castro in BMP 2021. Stay hydrated, miguel in hot weather. Hyperglycemia  Doing ok lately. Last A1c ok. Will con't to monitor periodically, need to keep wt down. Recheck today. Tx PRN. Lab Results   Component Value Date/Time    Hemoglobin A1c 6.1 (H) 2020 01:48 PM     GERD and GI bleed prophy  con't prilosec, is on double anticoag indefinitely. Check H/H, iron 6-12mo    Overweight  Stable. Work on diet. Breast pain ((R) breast pain. States had a oblong sore pain site to (R) breast.  No trauma. Exam today shows no mass or lesion, no surface color change. Suspect duct ectasia. mammo benign. Monitor, use warm compresses PRN. F/U 3mo /PRN  1. Have you been to the ER, urgent care clinic since your last visit? Hospitalized since your last visit? No    2. Have you seen or consulted any other health care providers outside of the [address] [place] [address] since your last visit? Include any pap smears or colon screening. No    Identified pt with two pt identifiers(name and ). Reviewed record in preparation for visit and have obtained necessary documentation.     Symptom review:    NO  Fever   NO  Shaking chills  NO  Cough  NO Headaches  NO  Body aches  NO  Coughing up blood  NO  Chest congestion  NO  Chest pain  NO  Shortness of breath  NO  Profound Loss of smell/taste  NO  Nausea/Vomiting   NO  Loose stool/Diarrhea  NO  any skin issues

## 2021-08-11 ENCOUNTER — TELEPHONE (OUTPATIENT)
Dept: FAMILY MEDICINE CLINIC | Age: 86
End: 2021-08-11

## 2021-08-11 NOTE — TELEPHONE ENCOUNTER
Patient confused on medication name. She knew medication as atorvastatin instead of lipitor. Daughter advised.

## 2021-08-11 NOTE — TELEPHONE ENCOUNTER
----- Message from Sree Bray sent at 8/11/2021  8:34 AM EDT -----  Regarding: Dr Rodriguez [place]: [phone]  General Message/Vendor Calls    Caller's first and last name:mica Lainez/daughter      Reason for call:daughter states the pharmacy called to state the Lipitor was ready to be picked up,but the patient states she doesn't take Lipitor. Please advise.       Callback required yes/no and why:yes      Best contact number(s):[phone]      Details to clarify the request:      Sree Bray

## 2021-09-07 ENCOUNTER — OFFICE VISIT (OUTPATIENT)
Dept: FAMILY MEDICINE CLINIC | Age: 86
End: 2021-09-07
Payer: MEDICARE

## 2021-09-07 VITALS
BODY MASS INDEX: 30.51 KG/M2 | DIASTOLIC BLOOD PRESSURE: 82 MMHG | SYSTOLIC BLOOD PRESSURE: 138 MMHG | RESPIRATION RATE: 16 BRPM | WEIGHT: 194.4 LBS | OXYGEN SATURATION: 96 % | HEART RATE: 85 BPM | HEIGHT: 67 IN

## 2021-09-07 DIAGNOSIS — I73.9 PERIPHERAL VASCULAR DISEASE (HCC): ICD-10-CM

## 2021-09-07 DIAGNOSIS — Z23 ENCOUNTER FOR IMMUNIZATION: ICD-10-CM

## 2021-09-07 DIAGNOSIS — N18.30 STAGE 3 CHRONIC KIDNEY DISEASE, UNSPECIFIED WHETHER STAGE 3A OR 3B CKD (HCC): ICD-10-CM

## 2021-09-07 DIAGNOSIS — I10 ESSENTIAL HYPERTENSION: Primary | ICD-10-CM

## 2021-09-07 DIAGNOSIS — G45.9 TIA (TRANSIENT ISCHEMIC ATTACK): ICD-10-CM

## 2021-09-07 DIAGNOSIS — I65.23 BILATERAL CAROTID ARTERY STENOSIS: ICD-10-CM

## 2021-09-07 DIAGNOSIS — R73.9 HYPERGLYCEMIA: ICD-10-CM

## 2021-09-07 DIAGNOSIS — E78.00 PURE HYPERCHOLESTEROLEMIA: ICD-10-CM

## 2021-09-07 PROCEDURE — 90694 VACC AIIV4 NO PRSRV 0.5ML IM: CPT | Performed by: FAMILY MEDICINE

## 2021-09-07 PROCEDURE — 99214 OFFICE O/P EST MOD 30 MIN: CPT | Performed by: FAMILY MEDICINE

## 2021-09-07 PROCEDURE — G0008 ADMIN INFLUENZA VIRUS VAC: HCPCS | Performed by: FAMILY MEDICINE

## 2021-09-07 RX ORDER — AMLODIPINE BESYLATE 5 MG/1
5 TABLET ORAL DAILY
Qty: 90 TABLET | Refills: 3 | Status: SHIPPED | OUTPATIENT
Start: 2021-09-07 | End: 2021-09-29

## 2021-09-07 NOTE — PROGRESS NOTES
After obtaining consent, and per orders of Dr. Wilhemina Hodgkins, injection of Fluad given by Toby Miller LPN. Patient instructed to remain in clinic for 20 minutes afterwards, and to report any adverse reaction to me immediately.

## 2021-09-07 NOTE — PROGRESS NOTES
Chief Complaint   Patient presents with    Cholesterol Problem     Marcel Aparicio is a 80 y.o. female    1. Have you been to the ER, urgent care clinic since your last visit? Hospitalized since your last visit? Yes er in [place]   2. Have you seen or consulted any other health care providers outside of the [address] [place] [address] since your last visit? Include any pap smears or colon screening. No    HPI:  F/U TIA/CVA  Doing well on ASA 81mg every day, eliquis 2.5 BID. Remains on BP pills (norvasc 5mg/d) and statin lipitor to 10mg/d. No orthostasis sx since last visit. No syncope, no falls, no vision change lately. CTA 10/2015 showed minimal atherosclerosis. No facial droop, no change in hand clumsiness (R hand a little weaker than left after last stroke), and does have R foot weakness lately. occ feels like leg is going out on her. Uses cane most of the time, helps. On pepcid and prilosec due to breakthru GI bleeding while on just PPI. Lab Results   Component Value Date/Time    Hemoglobin (POC) 11.0 06/07/2021 11:25 AM    Hemoglobin (POC) 12.9 10/22/2015 04:59 PM    HGB 12.5 07/06/2020 01:48 PM     Hypertension. Blood pressures up today, prev have been great. Management at last visit at included con't current regimen norvasc 5mg daily. Current regimen: calcium channel blocker. Symptoms include no symptoms. Patient denies chest pain, palpitations, headache.  Last GFR close to baseline to a little worse, was a little pre-renal.  Lab review:   Lab Results   Component Value Date/Time    Sodium 142 03/08/2021 11:30 AM    Potassium 3.9 03/08/2021 11:30 AM    Chloride 107 03/08/2021 11:30 AM    CO2 29 03/08/2021 11:30 AM    Anion gap 6 03/08/2021 11:30 AM    Glucose 81 03/08/2021 11:30 AM    BUN 18 03/08/2021 11:30 AM    Creatinine 0.88 03/08/2021 11:30 AM    BUN/Creatinine ratio 20 03/08/2021 11:30 AM    GFR est AA >60 03/08/2021 11:30 AM    GFR est non-AA >60 03/08/2021 11:30 AM    Calcium 8.9 03/08/2021 11:30 AM     Hyperlipidemia. On lipitor 10mg now. Judd well. No myalgias, arthralgias, unusual weakness. Lab Results   Component Value Date/Time    Cholesterol, total 132 03/08/2021 11:30 AM    HDL Cholesterol 104 03/08/2021 11:30 AM    LDL, calculated 17.4 03/08/2021 11:30 AM    VLDL, calculated 10.6 03/08/2021 11:30 AM    Triglyceride 53 03/08/2021 11:30 AM    CHOL/HDL Ratio 1.3 03/08/2021 11:30 AM     Lab Results   Component Value Date/Time    ALT (SGPT) 16 03/08/2021 11:30 AM    Alk. phosphatase 98 03/08/2021 11:30 AM    Bilirubin, total 0.4 03/08/2021 11:30 AM     OAB  Saw GYN Dr. Zina Howell at [facility] in [place]. Doing well on oxybutynin XL 15 mg daily, judd well. PMH, SH, Medications/Allergies: reviewed, on chart. ROS:  Constitutional: No fever, chills or weight loss  Respiratory: No cough, SOB   CV: No chest pain or Palpitations    Visit Vitals  /82 (BP 1 Location: Left arm)   Pulse 85   Resp 16   Ht 5' 7\" (1.702 m)   Wt 194 lb 6.4 oz (88.2 kg)   SpO2 96%   BMI 30.45 kg/m²     +1#  Wt Readings from Last 3 Encounters:   09/07/21 194 lb 6.4 oz (88.2 kg)   06/07/21 193 lb 3.2 oz (87.6 kg)   03/08/21 192 lb 9.6 oz (87.4 kg)     BP Readings from Last 3 Encounters:   09/07/21 138/82   06/07/21 122/66   03/08/21 (!) 156/78     Physical Examination: General appearance - alert, well appearing, and in no distress  Mental status - alert, oriented to person, place, and time  Eyes - pupils equal and reactive, extraocular eye movements intact. L lower lid with 2 small skin tags, pt reports itchy and bothersome. ENT - bilateral external ears and nose normal. Normal lips  Neck - supple, no significant adenopathy, no thyromegaly or mass.   Lymphatics - no palpable lymphadenopathy, no hepatosplenomegaly  Chest - clear to auscultation, no wheezes, rales or rhonchi, symmetric air entry  Heart - normal rate, irreg/irregular rhythm, normal S1, S2, no murmurs, rubs, clicks or gallops  Extremities - peripheral pulses normal, trace pedal edema, no clubbing or cyanosis. Neuro- CN 2-12 intact to test, nl str, coordination and ROM UE and LE bilat. A/P:  TIA/HLD/Carotid stenosis/anticoagulation  Doing well. Con't eliquis, statin, ASA. Plan to con't ASA 81mg and eliquis together indefinitely, provided pt has no bleeding problems. Recheck CMP and lipids fall . HGB ok today, no signs of anemia. HTN and CKD  Pressure ok today, Monitor BP's. Con't current regimen, recheck Mary Dempsey in BMP fall . Stay hydrated, miguel in hot weather. Hyperglycemia  Doing ok lately. Last A1c ok. Will con't to monitor periodically, need to keep wt down. Recheck today. Tx PRN. Lab Results   Component Value Date/Time    Hemoglobin A1c 6.1 (H) 2020 01:48 PM     GERD and GI bleed prophy  con't prilosec, is on double anticoag indefinitely. Check H/H, iron 6-12mo    Overweight  Stable. Work on diet. Breast pain ((R) breast pain. States had a oblong sore pain site to (R) breast.  No trauma. Exam today shows no mass or lesion, no surface color change. Suspect duct ectasia. mammo benign. Monitor, use warm compresses PRN. F/U 3mo /PRN  1. Have you been to the ER, urgent care clinic since your last visit? Hospitalized since your last visit? No    2. Have you seen or consulted any other health care providers outside of the [address] [place] [address] since your last visit? Include any pap smears or colon screening. No    Identified pt with two pt identifiers(name and ). Reviewed record in preparation for visit and have obtained necessary documentation.

## 2021-09-07 NOTE — PATIENT INSTRUCTIONS
Influenza (Flu) Vaccine (Inactivated or Recombinant): What You Need to Know  Why get vaccinated? Influenza vaccine can prevent influenza (flu). Flu is a contagious disease that spreads around the [place] every year, usually between October and May. Anyone can get the flu, but it is more dangerous for some people. Infants and young children, people 72years of age and older, pregnant women, and people with certain health conditions or a weakened immune system are at greatest risk of flu complications. Pneumonia, bronchitis, sinus infections and ear infections are examples of flu-related complications. If you have a medical condition, such as heart disease, cancer or diabetes, flu can make it worse. Flu can cause fever and chills, sore throat, muscle aches, fatigue, cough, headache, and runny or stuffy nose. Some people may have vomiting and diarrhea, though this is more common in children than adults. Each year, thousands of people in the [facility] die from flu, and many more are hospitalized. Flu vaccine prevents millions of illnesses and flu-related visits to the doctor each year. Influenza vaccine  CDC recommends everyone 10months of age and older get vaccinated every flu season. Children 6 months through 6years of age may need 2 doses during a single flu season. Everyone else needs only 1 dose each flu season. It takes about 2 weeks for protection to develop after vaccination. There are many flu viruses, and they are always changing. Each year a new flu vaccine is made to protect against three or four viruses that are likely to cause disease in the upcoming flu season. Even when the vaccine doesn't exactly match these viruses, it may still provide some protection. Influenza vaccine does not cause flu. Influenza vaccine may be given at the same time as other vaccines.   Talk with your health care provider  Tell your vaccine provider if the person getting the vaccine:  · Has had an allergic reaction after a previous dose of influenza vaccine, or has any severe, life-threatening allergies. · Has ever had Guillain-Barré Syndrome (also called GBS). In some cases, your health care provider may decide to postpone influenza vaccination to a future visit. People with minor illnesses, such as a cold, may be vaccinated. People who are moderately or severely ill should usually wait until they recover before getting influenza vaccine. Your health care provider can give you more information. Risks of a vaccine reaction  · Soreness, redness, and swelling where shot is given, fever, muscle aches, and headache can happen after influenza vaccine. · There may be a very small increased risk of Guillain-Barré Syndrome (GBS) after inactivated influenza vaccine (the flu shot). [place] All children who get the flu shot along with pneumococcal vaccine (PCV13), and/or DTaP vaccine at the same time might be slightly more likely to have a seizure caused by fever. Tell your health care provider if a child who is getting flu vaccine has ever had a seizure. People sometimes faint after medical procedures, including vaccination. Tell your provider if you feel dizzy or have vision changes or ringing in the ears. As with any medicine, there is a very remote chance of a vaccine causing a severe allergic reaction, other serious injury, or death. What if there is a serious problem? An allergic reaction could occur after the vaccinated person leaves the clinic. If you see signs of a severe allergic reaction (hives, swelling of the face and throat, difficulty breathing, a fast heartbeat, dizziness, or weakness), call 9-1-1 and get the person to the nearest hospital.  For other signs that concern you, call your health care provider. Adverse reactions should be reported to the Vaccine Adverse Event Reporting System (VAERS). Your health care provider will usually file this report, or you can do it yourself.  Visit the VAERS website at www.vaers. [facility].gov or call 3-592.469.4153. VAERS is only for reporting reactions, and VAERS staff do not give medical advice. The National Vaccine Injury Compensation Program  The National Vaccine Injury Compensation Program (VICP) is a federal program that was created to compensate people who may have been injured by certain vaccines. Visit the VICP website at www.[facility].gov/vaccinecompensation or call 7-295.771.1762 to learn about the program and about filing a claim. There is a time limit to file a claim for compensation. How can I learn more? · Ask your healthcare provider. · Call your local or state health department. · Contact the Centers for Disease Control and Prevention (CDC):  ? Call 2-922.315.5765 (1-800-CDC-INFO) or  ? Visit CDC's website at www.cdc.gov/flu  Vaccine Information Statement (Interim)  Inactivated Influenza Vaccine  8/15/2019  [address] Melody Heimlich 185NS-43  Department of Health and Human Services  Centers for Disease Control and Prevention  Many Vaccine Information Statements are available in [place]ian and other languages. See www.immunize.org/vis. Muchas hojas de información sobre vacunas están disponibles en español y en otros idiomas. Visite www.immunize.org/vis. Care instructions adapted under license by [employer] (which disclaims liability or warranty for this information). If you have questions about a medical condition or this instruction, always ask your healthcare professional. [place] [address] any warranty or liability for your use of this information.

## 2021-09-08 LAB
ALBUMIN SERPL-MCNC: 3.1 G/DL (ref 3.5–5)
ALBUMIN/GLOB SERPL: 0.9 {RATIO} (ref 1.1–2.2)
ALP SERPL-CCNC: 89 U/L (ref 45–117)
ALT SERPL-CCNC: 18 U/L (ref 12–78)
ANION GAP SERPL CALC-SCNC: 4 MMOL/L (ref 5–15)
AST SERPL-CCNC: 13 U/L (ref 15–37)
BILIRUB SERPL-MCNC: 0.3 MG/DL (ref 0.2–1)
BUN SERPL-MCNC: 21 MG/DL (ref 6–20)
BUN/CREAT SERPL: 19 (ref 12–20)
CALCIUM SERPL-MCNC: 8.7 MG/DL (ref 8.5–10.1)
CHLORIDE SERPL-SCNC: 109 MMOL/L (ref 97–108)
CHOLEST SERPL-MCNC: 122 MG/DL
CO2 SERPL-SCNC: 29 MMOL/L (ref 21–32)
CREAT SERPL-MCNC: 1.11 MG/DL (ref 0.55–1.02)
EST. AVERAGE GLUCOSE BLD GHB EST-MCNC: 134 MG/DL
GLOBULIN SER CALC-MCNC: 3.6 G/DL (ref 2–4)
GLUCOSE SERPL-MCNC: 99 MG/DL (ref 65–100)
HBA1C MFR BLD: 6.3 % (ref 4–5.6)
HDLC SERPL-MCNC: 85 MG/DL
HDLC SERPL: 1.4 {RATIO} (ref 0–5)
LDLC SERPL CALC-MCNC: 28.6 MG/DL (ref 0–100)
POTASSIUM SERPL-SCNC: 3.9 MMOL/L (ref 3.5–5.1)
PROT SERPL-MCNC: 6.7 G/DL (ref 6.4–8.2)
SODIUM SERPL-SCNC: 142 MMOL/L (ref 136–145)
TRIGL SERPL-MCNC: 42 MG/DL (ref ?–150)
VLDLC SERPL CALC-MCNC: 8.4 MG/DL

## 2022-02-02 ENCOUNTER — OFFICE VISIT (OUTPATIENT)
Dept: FAMILY MEDICINE CLINIC | Age: 87
End: 2022-02-02
Payer: MEDICARE

## 2022-02-02 VITALS
TEMPERATURE: 98.2 F | WEIGHT: 193.5 LBS | DIASTOLIC BLOOD PRESSURE: 90 MMHG | RESPIRATION RATE: 20 BRPM | HEIGHT: 67 IN | HEART RATE: 88 BPM | SYSTOLIC BLOOD PRESSURE: 170 MMHG | BODY MASS INDEX: 30.37 KG/M2 | OXYGEN SATURATION: 98 %

## 2022-02-02 DIAGNOSIS — E78.00 PURE HYPERCHOLESTEROLEMIA: ICD-10-CM

## 2022-02-02 DIAGNOSIS — I73.9 PERIPHERAL VASCULAR DISEASE (HCC): ICD-10-CM

## 2022-02-02 DIAGNOSIS — R73.9 HYPERGLYCEMIA: Primary | ICD-10-CM

## 2022-02-02 DIAGNOSIS — D50.0 IRON DEFICIENCY ANEMIA DUE TO CHRONIC BLOOD LOSS: ICD-10-CM

## 2022-02-02 DIAGNOSIS — N18.30 STAGE 3 CHRONIC KIDNEY DISEASE, UNSPECIFIED WHETHER STAGE 3A OR 3B CKD (HCC): ICD-10-CM

## 2022-02-02 DIAGNOSIS — I10 PRIMARY HYPERTENSION: ICD-10-CM

## 2022-02-02 DIAGNOSIS — R60.0 PEDAL EDEMA: ICD-10-CM

## 2022-02-02 DIAGNOSIS — G45.9 TIA (TRANSIENT ISCHEMIC ATTACK): ICD-10-CM

## 2022-02-02 PROCEDURE — 99214 OFFICE O/P EST MOD 30 MIN: CPT | Performed by: FAMILY MEDICINE

## 2022-02-02 RX ORDER — FUROSEMIDE 20 MG/1
TABLET ORAL
Qty: 30 TABLET | Refills: 1 | Status: SHIPPED | OUTPATIENT
Start: 2022-02-02 | End: 2022-02-09 | Stop reason: SDUPTHER

## 2022-02-02 NOTE — PROGRESS NOTES
Chief Complaint   Patient presents with    Hypertension     Denson Siemens is a 80 y.o. female    1. Have you been to the ER, urgent care clinic since your last visit? Hospitalized since your last visit? Yes er in [place]   2. Have you seen or consulted any other health care providers outside of the [address] [place] [address] since your last visit? Include any pap smears or colon screening. No    HPI:  F/U TIA/CVA  Doing well on ASA 81mg every day, eliquis 2.5 BID. Remains on BP pills (norvasc 5mg/d) and statin lipitor to 10mg/d. No syncope, no falls, but is having more orthostasis sx since last visit, with mild darkening of vision once. CTA 10/2015 showed minimal atherosclerosis. No facial droop, no change in hand clumsiness (R hand a little weaker than left after last stroke), and does have R foot weakness lately. occ feels like leg is going out on her. Uses cane most of the time, helps. On pepcid and prilosec due to breakthru GI bleeding while on just PPI. Did have one spell of BRBPR in [address] 2021, single episode, low volume. Lab Results   Component Value Date/Time    Hemoglobin (POC) 11.0 06/07/2021 11:25 AM    Hemoglobin (POC) 12.9 10/22/2015 04:59 PM    HGB 12.5 07/06/2020 01:48 PM     Hypertension. Blood pressures up today, prev have been great. Management at last visit at included con't current regimen norvasc 5mg daily. Current regimen: calcium channel blocker. Symptoms include mild pedal edema. Patient denies chest pain, palpitations, headache.  Last GFR close to baseline to a little worse, was a little pre-renal.  Lab review:   Lab Results   Component Value Date/Time    Sodium 142 09/07/2021 11:26 AM    Potassium 3.9 09/07/2021 11:26 AM    Chloride 109 (H) 09/07/2021 11:26 AM    CO2 29 09/07/2021 11:26 AM    Anion gap 4 (L) 09/07/2021 11:26 AM    Glucose 99 09/07/2021 11:26 AM    BUN 21 (H) 09/07/2021 11:26 AM    Creatinine 1.11 (H) 09/07/2021 11:26 AM    BUN/Creatinine ratio 19 09/07/2021 11:26 AM GFR est AA 56 (L) 09/07/2021 11:26 AM    GFR est non-AA 46 (L) 09/07/2021 11:26 AM    Calcium 8.7 09/07/2021 11:26 AM     Hyperlipidemia. On lipitor 10mg now. Judd well. No myalgias, arthralgias, unusual weakness. Lab Results   Component Value Date/Time    Cholesterol, total 122 09/07/2021 11:26 AM    HDL Cholesterol 85 09/07/2021 11:26 AM    LDL, calculated 28.6 09/07/2021 11:26 AM    VLDL, calculated 8.4 09/07/2021 11:26 AM    Triglyceride 42 09/07/2021 11:26 AM    CHOL/HDL Ratio 1.4 09/07/2021 11:26 AM     Lab Results   Component Value Date/Time    ALT (SGPT) 18 09/07/2021 11:26 AM    Alk. phosphatase 89 09/07/2021 11:26 AM    Bilirubin, total 0.3 09/07/2021 11:26 AM     OAB  Saw GYN Dr. Morin Room at [facility] in [place]. Doing well on oxybutynin XL 15 mg daily, judd well. PMH, SH, Medications/Allergies: reviewed, on chart. ROS:  Constitutional: No fever, chills or weight loss  Respiratory: No cough, SOB   CV: No chest pain or Palpitations    Visit Vitals  BP (!) 170/90 (BP 1 Location: Right arm)   Pulse 88   Temp 98.2 °F (36.8 °C) (Temporal)   Resp 20   Ht 5' 7\" (1.702 m)   Wt 193 lb 8 oz (87.8 kg)   SpO2 98%   BMI 30.31 kg/m²     -1#  Wt Readings from Last 3 Encounters:   02/02/22 193 lb 8 oz (87.8 kg)   09/07/21 194 lb 6.4 oz (88.2 kg)   06/07/21 193 lb 3.2 oz (87.6 kg)     BP Readings from Last 3 Encounters:   02/02/22 (!) 170/90   09/07/21 138/82   06/07/21 122/66     Physical Examination: General appearance - alert, well appearing, and in no distress  Mental status - alert, oriented to person, place, and time  Eyes - pupils equal and reactive, extraocular eye movements intact. L lower lid with 2 small skin tags, pt reports itchy and bothersome. ENT - bilateral external ears and nose normal. Normal lips  Neck - supple, no significant adenopathy, no thyromegaly or mass.   Lymphatics - no palpable lymphadenopathy, no hepatosplenomegaly  Chest - clear to auscultation, no wheezes, rales or rhonchi, symmetric air entry  Heart - normal rate, irreg/irregular rhythm, normal S1, S2, no murmurs, rubs, clicks or gallops  Extremities - peripheral pulses normal, trace pedal edema, no clubbing or cyanosis. Neuro- CN 2-12 intact to test, nl str, coordination and ROM UE and LE bilat. A/P:  TIA/HLD/Carotid stenosis/anticoagulation  Doing so/so, having more sx of dizziness lately. Concerned about more bleeding issues. Check labs, and adjust meds PRN. Con't eliquis, statin, ASA. Plan to con't ASA 81mg and eliquis together indefinitely, provided pt has no bleeding problems. Consider home physical therapy if labs are reassuring to help with the gait. HTN and CKD  Pressure up a lot today, prev in goal, with maybe a little more pedal edema. Try short course of lasix 20mg/d x 10d, then PRN only for swelling. Monitor BP's. Con't current regimen, recheck Prosper Presidio in BMP fall . Stay hydrated, miguel in hot weather. Hyperglycemia  Doing ok lately. Last A1c ok. Will con't to monitor periodically, need to keep wt down. Recheck today. Tx PRN. Lab Results   Component Value Date/Time    Hemoglobin A1c 6.3 (H) 2021 11:26 AM     GERD and GI bleed prophy  con't prilosec, is on double anticoag indefinitely. Check CBC, iron now and q6-12mo    Overweight  Stable. Work on diet. Breast pain ((R) breast pain. Resolved after last visit. F/U 1mo /PRN    1. Have you been to the ER, urgent care clinic since your last visit? Hospitalized since your last visit? No    2. Have you seen or consulted any other health care providers outside of the [address] [place] [address] since your last visit? Include any pap smears or colon screening. No    Identified pt with two pt identifiers(name and ). Reviewed record in preparation for visit and have obtained necessary documentation.

## 2022-02-02 NOTE — PROGRESS NOTES
Visit Vitals  BP (!) 170/90 (BP 1 Location: Right arm)   Pulse 88   Temp 98.2 °F (36.8 °C) (Temporal)   Resp 20   Ht 5' 7\" (1.702 m)   Wt 193 lb 8 oz (87.8 kg)   SpO2 98%   BMI 30.31 kg/m²     Chief Complaint   Patient presents with    Hypertension     1. Have you been to the ER, urgent care clinic since your last visit? Hospitalized since your last visit? No    2. Have you seen or consulted any other health care providers outside of the [address] [place] [address] since your last visit? Include any pap smears or colon screening.  No

## 2022-02-03 LAB
ALBUMIN SERPL-MCNC: 3.3 G/DL (ref 3.5–5)
ALBUMIN/GLOB SERPL: 1 {RATIO} (ref 1.1–2.2)
ALP SERPL-CCNC: 94 U/L (ref 45–117)
ALT SERPL-CCNC: 17 U/L (ref 12–78)
ANION GAP SERPL CALC-SCNC: 3 MMOL/L (ref 5–15)
AST SERPL-CCNC: 12 U/L (ref 15–37)
BASOPHILS # BLD: 0 K/UL (ref 0–0.1)
BASOPHILS NFR BLD: 0 % (ref 0–1)
BILIRUB SERPL-MCNC: 0.3 MG/DL (ref 0.2–1)
BUN SERPL-MCNC: 18 MG/DL (ref 6–20)
BUN/CREAT SERPL: 16 (ref 12–20)
CALCIUM SERPL-MCNC: 9.2 MG/DL (ref 8.5–10.1)
CHLORIDE SERPL-SCNC: 108 MMOL/L (ref 97–108)
CHOLEST SERPL-MCNC: 123 MG/DL
CO2 SERPL-SCNC: 29 MMOL/L (ref 21–32)
CREAT SERPL-MCNC: 1.14 MG/DL (ref 0.55–1.02)
DIFFERENTIAL METHOD BLD: ABNORMAL
EOSINOPHIL # BLD: 0.1 K/UL (ref 0–0.4)
EOSINOPHIL NFR BLD: 2 % (ref 0–7)
ERYTHROCYTE [DISTWIDTH] IN BLOOD BY AUTOMATED COUNT: 12.8 % (ref 11.5–14.5)
EST. AVERAGE GLUCOSE BLD GHB EST-MCNC: 128 MG/DL
GLOBULIN SER CALC-MCNC: 3.4 G/DL (ref 2–4)
GLUCOSE SERPL-MCNC: 111 MG/DL (ref 65–100)
HBA1C MFR BLD: 6.1 % (ref 4–5.6)
HCT VFR BLD AUTO: 38.9 % (ref 35–47)
HDLC SERPL-MCNC: 85 MG/DL
HDLC SERPL: 1.4 {RATIO} (ref 0–5)
HGB BLD-MCNC: 12.5 G/DL (ref 11.5–16)
IMM GRANULOCYTES # BLD AUTO: 0.1 K/UL (ref 0–0.04)
IMM GRANULOCYTES NFR BLD AUTO: 2 % (ref 0–0.5)
IRON SATN MFR SERPL: 24 % (ref 20–50)
IRON SERPL-MCNC: 54 UG/DL (ref 35–150)
LDLC SERPL CALC-MCNC: 24.2 MG/DL (ref 0–100)
LYMPHOCYTES # BLD: 1 K/UL (ref 0.8–3.5)
LYMPHOCYTES NFR BLD: 21 % (ref 12–49)
MCH RBC QN AUTO: 30.2 PG (ref 26–34)
MCHC RBC AUTO-ENTMCNC: 32.1 G/DL (ref 30–36.5)
MCV RBC AUTO: 94 FL (ref 80–99)
MONOCYTES # BLD: 0.4 K/UL (ref 0–1)
MONOCYTES NFR BLD: 8 % (ref 5–13)
NEUTS SEG # BLD: 3.1 K/UL (ref 1.8–8)
NEUTS SEG NFR BLD: 67 % (ref 32–75)
NRBC # BLD: 0 K/UL (ref 0–0.01)
NRBC BLD-RTO: 0 PER 100 WBC
PLATELET # BLD AUTO: 171 K/UL (ref 150–400)
PMV BLD AUTO: 11 FL (ref 8.9–12.9)
POTASSIUM SERPL-SCNC: 3.9 MMOL/L (ref 3.5–5.1)
PROT SERPL-MCNC: 6.7 G/DL (ref 6.4–8.2)
RBC # BLD AUTO: 4.14 M/UL (ref 3.8–5.2)
SODIUM SERPL-SCNC: 140 MMOL/L (ref 136–145)
TIBC SERPL-MCNC: 223 UG/DL (ref 250–450)
TRIGL SERPL-MCNC: 69 MG/DL (ref ?–150)
VLDLC SERPL CALC-MCNC: 13.8 MG/DL
WBC # BLD AUTO: 4.7 K/UL (ref 3.6–11)

## 2022-02-07 ENCOUNTER — TELEPHONE (OUTPATIENT)
Dept: FAMILY MEDICINE CLINIC | Age: 87
End: 2022-02-07

## 2022-02-07 NOTE — TELEPHONE ENCOUNTER
Patient's daughter (also Florene) reports mother has a red, swollen toe that needs to be seen. They are unable to navigate a virtual visit due to technology limitations.

## 2022-02-09 ENCOUNTER — OFFICE VISIT (OUTPATIENT)
Dept: FAMILY MEDICINE CLINIC | Age: 87
End: 2022-02-09
Payer: MEDICARE

## 2022-02-09 VITALS
HEART RATE: 78 BPM | HEIGHT: 67 IN | TEMPERATURE: 97.6 F | WEIGHT: 186 LBS | DIASTOLIC BLOOD PRESSURE: 78 MMHG | SYSTOLIC BLOOD PRESSURE: 130 MMHG | RESPIRATION RATE: 20 BRPM | BODY MASS INDEX: 29.19 KG/M2 | OXYGEN SATURATION: 97 %

## 2022-02-09 DIAGNOSIS — I10 PRIMARY HYPERTENSION: ICD-10-CM

## 2022-02-09 DIAGNOSIS — R60.0 PEDAL EDEMA: ICD-10-CM

## 2022-02-09 DIAGNOSIS — E78.00 PURE HYPERCHOLESTEROLEMIA: ICD-10-CM

## 2022-02-09 DIAGNOSIS — M10.072 ACUTE IDIOPATHIC GOUT INVOLVING TOE OF LEFT FOOT: Primary | ICD-10-CM

## 2022-02-09 DIAGNOSIS — K21.9 GASTROESOPHAGEAL REFLUX DISEASE WITHOUT ESOPHAGITIS: ICD-10-CM

## 2022-02-09 DIAGNOSIS — N18.30 STAGE 3 CHRONIC KIDNEY DISEASE, UNSPECIFIED WHETHER STAGE 3A OR 3B CKD (HCC): ICD-10-CM

## 2022-02-09 PROCEDURE — 99214 OFFICE O/P EST MOD 30 MIN: CPT | Performed by: FAMILY MEDICINE

## 2022-02-09 RX ORDER — PREDNISONE 20 MG/1
20 TABLET ORAL
Qty: 4 TABLET | Refills: 0 | Status: SHIPPED | OUTPATIENT
Start: 2022-02-09 | End: 2022-02-13

## 2022-02-09 RX ORDER — FUROSEMIDE 20 MG/1
20 TABLET ORAL
Qty: 30 TABLET | Refills: 0
Start: 2022-02-10 | End: 2022-06-15 | Stop reason: SDUPTHER

## 2022-02-09 NOTE — PROGRESS NOTES
Chief Complaint   Patient presents with    Other     Swollen left toe    Ankle swelling     bilateral pitting edema     Kalie Luque is a 80 y.o. female    1. Have you been to the ER, urgent care clinic since your last visit? Hospitalized since your last visit? Yes er in [place]   2. Have you seen or consulted any other health care providers outside of the [address] [place] [address] since your last visit? Include any pap smears or colon screening. No    HPI:  F/U TIA/CVA  Doing well on ASA 81mg every day, eliquis 2.5 BID. Remains on BP pills (norvasc 5mg/d) and statin lipitor to 10mg/d. No syncope, no falls. Less orthostasis sx since last visit. No darkening of vision since last visit. CTA 10/2015 showed minimal atherosclerosis. No BRBPR since last visit. Lab Results   Component Value Date/Time    Hemoglobin (POC) 11.0 06/07/2021 11:25 AM    Hemoglobin (POC) 12.9 10/22/2015 04:59 PM    HGB 12.5 02/02/2022 10:26 AM     Hypertension. Blood pressures better today, prev have been great. Management at last visit at included con't current regimen norvasc 5mg daily. Current regimen: calcium channel blocker. Symptoms include mild pedal edema. Patient denies chest pain, palpitations, headache. Last GFR close to baseline to a little worse, was a little pre-renal.  Lab review:   Lab Results   Component Value Date/Time    Sodium 140 02/02/2022 10:26 AM    Potassium 3.9 02/02/2022 10:26 AM    Chloride 108 02/02/2022 10:26 AM    CO2 29 02/02/2022 10:26 AM    Anion gap 3 (L) 02/02/2022 10:26 AM    Glucose 111 (H) 02/02/2022 10:26 AM    BUN 18 02/02/2022 10:26 AM    Creatinine 1.14 (H) 02/02/2022 10:26 AM    BUN/Creatinine ratio 16 02/02/2022 10:26 AM    GFR est AA 55 (L) 02/02/2022 10:26 AM    GFR est non-AA 45 (L) 02/02/2022 10:26 AM    Calcium 9.2 02/02/2022 10:26 AM     Hyperlipidemia. On lipitor 10mg now. Rishabh well. No myalgias, arthralgias, unusual weakness.   Lab Results   Component Value Date/Time Cholesterol, total 123 02/02/2022 10:26 AM    HDL Cholesterol 85 02/02/2022 10:26 AM    LDL, calculated 24.2 02/02/2022 10:26 AM    VLDL, calculated 13.8 02/02/2022 10:26 AM    Triglyceride 69 02/02/2022 10:26 AM    CHOL/HDL Ratio 1.4 02/02/2022 10:26 AM     Lab Results   Component Value Date/Time    ALT (SGPT) 17 02/02/2022 10:26 AM    Alk. phosphatase 94 02/02/2022 10:26 AM    Bilirubin, total 0.3 02/02/2022 10:26 AM     OAB  Saw GYN Dr. Franky Butler at [facility] in [place]. Doing well on oxybutynin XL 15 mg daily, judd well. PMH, SH, Medications/Allergies: reviewed, on chart. ROS:  Constitutional: No fever, chills or weight loss  Respiratory: No cough, SOB   CV: No chest pain or Palpitations    Visit Vitals  /78 (BP 1 Location: Right arm)   Pulse 78   Temp 97.6 °F (36.4 °C) (Oral)   Resp 20   Ht 5' 7\" (1.702 m)   Wt 186 lb (84.4 kg)   SpO2 97%   BMI 29.13 kg/m²     -7#  Wt Readings from Last 3 Encounters:   02/09/22 186 lb (84.4 kg)   02/02/22 193 lb 8 oz (87.8 kg)   09/07/21 194 lb 6.4 oz (88.2 kg)     BP Readings from Last 3 Encounters:   02/09/22 130/78   02/02/22 (!) 170/90   09/07/21 138/82     Physical Examination: General appearance - alert, well appearing, and in no distress  Mental status - alert, oriented to person, place, and time  Eyes - pupils equal and reactive, extraocular eye movements intact. L lower lid with 2 small skin tags, pt reports itchy and bothersome. ENT - bilateral external ears and nose normal. Normal lips  Neck - supple, no significant adenopathy, no thyromegaly or mass. Lymphatics - no palpable lymphadenopathy, no hepatosplenomegaly  Chest - clear to auscultation, no wheezes, rales or rhonchi, symmetric air entry  Heart - normal rate, irreg/irregular rhythm, normal S1, S2, no murmurs, rubs, clicks or gallops  Extremities - peripheral pulses normal, trace pedal edema, no clubbing or cyanosis.     A/P:  Gout, acute, L hallux  Tx with prednisone 20mg/d x4d. Cut lasix dose as below    TIA/HLD/Carotid stenosis/anticoagulation  Doing so/so, having more sx of dizziness lately. Concerned about more bleeding issues. Check labs, and adjust meds PRN. Con't eliquis, statin, ASA. Plan to con't ASA 81mg and eliquis together indefinitely, provided pt has no bleeding problems. Consider home physical therapy if labs are reassuring to help with the gait. HTN and CKD  Pressure a lot better today, but now with a gout flareup, probably 2nd lasix 20mg/d. Cut that to BIW for now, but monitor for worsening swelling and monitor BP's. John Ropes in late spring. Stay hydrated, miguel in hot weather. Hyperglycemia  Doing ok lately. Last A1c ok. Will con't to monitor periodically, need to keep wt down. Plan Recheck fall 2022. Tx PRN. Lab Results   Component Value Date/Time    Hemoglobin A1c 6.1 (H) 02/02/2022 10:26 AM     GERD and GI bleed prophy  con't prilosec, is on double anticoag indefinitely. CBC, iron ok, plan rechec q6-12mo    Overweight  Stable. Work on diet. Breast pain ((R) breast pain. Resolved after last visit. F/U 1mo /PRN    1. Have you been to the ER, urgent care clinic since your last visit? Hospitalized since your last visit? No    2. Have you seen or consulted any other health care providers outside of the [address] [place] [address] since your last visit? Include any pap smears or colon screening.  No

## 2022-02-09 NOTE — PATIENT INSTRUCTIONS
Gout: Care Instructions  Overview     Gout is a form of arthritis caused by a buildup of uric acid crystals in a joint. It causes sudden attacks of pain, swelling, redness, and stiffness, usually in one joint, especially the big toe. Gout usually comes on without a cause. But it can be brought on by drinking alcohol (especially beer), eating or drinking things made with high-fructose corn syrup, or eating seafood or red meat. Taking certain medicines, such as diuretics, can also trigger an attack of gout. Taking your medicines as prescribed and following up with your doctor regularly can help you avoid gout attacks in the future. Follow-up care is a key part of your treatment and safety. Be sure to make and go to all appointments, and call your doctor if you are having problems. It's also a good idea to know your test results and keep a list of the medicines you take. How can you care for yourself at home? · If the joint is swollen, put ice or a cold pack on the area for 10 to 20 minutes at a time. Put a thin cloth between the ice and your skin. · Prop up the sore limb on a pillow when you ice it or anytime you sit or lie down during the next 3 days. Try to keep it above the level of your heart. This can help reduce swelling. · Rest sore joints. Avoid activities that put weight or strain on the joints for a few days. Take short rest breaks from your regular activities during the day. · Take your medicines exactly as prescribed. Call your doctor if you think you are having a problem with your medicine. · Take pain medicines exactly as directed. ? If the doctor gave you a prescription medicine for pain, take it as prescribed. ? If you are not taking a prescription pain medicine, ask your doctor if you can take an over-the-counter medicine. · Eat less seafood and red meat. · Avoid foods or drinks that are made with high-fructose corn syrup. · Check with your doctor before drinking alcohol.   · Losing weight, if you are overweight, may help reduce attacks of gout. But do not go on a diet that causes rapid weight loss. Losing a lot of weight in a short amount of time can cause a gout attack. When should you call for help? Call your doctor now or seek immediate medical care if:    · You have a fever.     · The joint is so painful you cannot use it.     · You have sudden, unexplained swelling, redness, warmth, or severe pain in one or more joints. Watch closely for changes in your health, and be sure to contact your doctor if:    · You have joint pain.     · Your symptoms get worse or are not improving after 2 or 3 days. Where can you learn more? Go to http://www.gray.com/  Enter E531 in the search box to learn more about \"Gout: Care Instructions. \"  Current as of: April 30, 2021               Content Version: 13.0  © 0424-2678 [employer]. Care instructions adapted under license by [employer] (which disclaims liability or warranty for this information). If you have questions about a medical condition or this instruction, always ask your healthcare professional. [address] any warranty or liability for your use of this information.

## 2022-03-04 DIAGNOSIS — K21.9 GASTROESOPHAGEAL REFLUX DISEASE WITHOUT ESOPHAGITIS: ICD-10-CM

## 2022-03-04 RX ORDER — PHENOL/SODIUM PHENOLATE
20 AEROSOL, SPRAY (ML) MUCOUS MEMBRANE DAILY
Qty: 90 TABLET | Refills: 3 | Status: SHIPPED | OUTPATIENT
Start: 2022-03-04 | End: 2022-08-31 | Stop reason: SDUPTHER

## 2022-03-17 ENCOUNTER — TELEPHONE (OUTPATIENT)
Dept: FAMILY MEDICINE CLINIC | Age: 87
End: 2022-03-17

## 2022-03-17 NOTE — TELEPHONE ENCOUNTER
----- Message from Rochelle Catsorena sent at 3/16/2022  4:39 PM EDT -----  Subject: Message to Provider    QUESTIONS  Information for Provider? Shell Ibanez, daughter, is requesting if the patient   can continue using eloquis when having oral surgery on 4/12/22? Please   call daughterShell, at [phone].   ---------------------------------------------------------------------------  --------------  Danay LOCKETT  What is the best way for the office to contact you? OK to leave message on   voicemail  Preferred Call Back Phone Number? [phone]  ---------------------------------------------------------------------------  --------------  SCRIPT ANSWERS  Relationship to Patient? Other  Representative Name? Shell Ibanez  Is the Representative on the appropriate HIPAA document in Epic?  Yes

## 2022-03-18 PROBLEM — I73.9 PERIPHERAL VASCULAR DISEASE (HCC): Status: ACTIVE | Noted: 2020-11-09

## 2022-03-18 PROBLEM — N13.30 HYDRONEPHROSIS: Status: ACTIVE | Noted: 2019-01-26

## 2022-03-18 NOTE — TELEPHONE ENCOUNTER
Patient has high risk of stroke, given recent medical conditions, so is NOT recommended to hold the eliquis at this time. The dentist should take precautions to prevent bleeding.

## 2022-03-19 PROBLEM — N18.30 CKD (CHRONIC KIDNEY DISEASE) STAGE 3, GFR 30-59 ML/MIN (HCC): Status: ACTIVE | Noted: 2019-10-15

## 2022-03-19 PROBLEM — N32.81 OAB (OVERACTIVE BLADDER): Status: ACTIVE | Noted: 2018-12-26

## 2022-03-30 DIAGNOSIS — K92.2 GASTROINTESTINAL HEMORRHAGE, UNSPECIFIED GASTROINTESTINAL HEMORRHAGE TYPE: ICD-10-CM

## 2022-03-30 DIAGNOSIS — D50.0 IRON DEFICIENCY ANEMIA DUE TO CHRONIC BLOOD LOSS: ICD-10-CM

## 2022-03-30 NOTE — TELEPHONE ENCOUNTER
Requested Prescriptions     Pending Prescriptions Disp Refills    apixaban (ELIQUIS) 2.5 mg tablet 60 Tablet 11     Sig: Take 1 Tablet by mouth two (2) times a day.  Indications: prevent stroke

## 2022-06-15 ENCOUNTER — OFFICE VISIT (OUTPATIENT)
Dept: FAMILY MEDICINE CLINIC | Age: 87
End: 2022-06-15
Payer: MEDICARE

## 2022-06-15 VITALS
SYSTOLIC BLOOD PRESSURE: 140 MMHG | RESPIRATION RATE: 18 BRPM | HEIGHT: 67 IN | HEART RATE: 75 BPM | WEIGHT: 184.5 LBS | TEMPERATURE: 97.4 F | OXYGEN SATURATION: 98 % | DIASTOLIC BLOOD PRESSURE: 76 MMHG | BODY MASS INDEX: 28.96 KG/M2

## 2022-06-15 DIAGNOSIS — R73.9 HYPERGLYCEMIA: ICD-10-CM

## 2022-06-15 DIAGNOSIS — Z00.00 MEDICARE ANNUAL WELLNESS VISIT, SUBSEQUENT: Primary | ICD-10-CM

## 2022-06-15 DIAGNOSIS — D50.0 IRON DEFICIENCY ANEMIA DUE TO CHRONIC BLOOD LOSS: ICD-10-CM

## 2022-06-15 DIAGNOSIS — I65.23 BILATERAL CAROTID ARTERY STENOSIS: ICD-10-CM

## 2022-06-15 DIAGNOSIS — Z13.39 SCREENING FOR ALCOHOLISM: ICD-10-CM

## 2022-06-15 DIAGNOSIS — Z13.31 SCREENING FOR DEPRESSION: ICD-10-CM

## 2022-06-15 DIAGNOSIS — I63.319 CEREBROVASCULAR ACCIDENT (CVA) DUE TO THROMBOSIS OF MIDDLE CEREBRAL ARTERY, UNSPECIFIED BLOOD VESSEL LATERALITY (HCC): ICD-10-CM

## 2022-06-15 DIAGNOSIS — I73.9 PERIPHERAL VASCULAR DISEASE (HCC): ICD-10-CM

## 2022-06-15 DIAGNOSIS — R29.898 WEAKNESS OF BOTH LEGS: ICD-10-CM

## 2022-06-15 DIAGNOSIS — N18.30 STAGE 3 CHRONIC KIDNEY DISEASE, UNSPECIFIED WHETHER STAGE 3A OR 3B CKD (HCC): ICD-10-CM

## 2022-06-15 DIAGNOSIS — Z86.73 HISTORY OF STROKE: ICD-10-CM

## 2022-06-15 DIAGNOSIS — R60.0 PEDAL EDEMA: ICD-10-CM

## 2022-06-15 DIAGNOSIS — I10 PRIMARY HYPERTENSION: ICD-10-CM

## 2022-06-15 DIAGNOSIS — M10.072 ACUTE IDIOPATHIC GOUT INVOLVING TOE OF LEFT FOOT: ICD-10-CM

## 2022-06-15 DIAGNOSIS — G45.9 TIA (TRANSIENT ISCHEMIC ATTACK): ICD-10-CM

## 2022-06-15 PROCEDURE — G0439 PPPS, SUBSEQ VISIT: HCPCS | Performed by: FAMILY MEDICINE

## 2022-06-15 PROCEDURE — 99214 OFFICE O/P EST MOD 30 MIN: CPT | Performed by: FAMILY MEDICINE

## 2022-06-15 RX ORDER — FUROSEMIDE 20 MG/1
20 TABLET ORAL
Qty: 30 TABLET | Refills: 1 | Status: SHIPPED | OUTPATIENT
Start: 2022-06-16

## 2022-06-15 RX ORDER — ASPIRIN 81 MG/1
81 TABLET ORAL DAILY
Qty: 100 TABLET | Refills: 3 | Status: SHIPPED | OUTPATIENT
Start: 2022-06-15

## 2022-06-15 RX ORDER — ATORVASTATIN CALCIUM 10 MG/1
TABLET, FILM COATED ORAL
Qty: 90 TABLET | Refills: 3 | Status: SHIPPED | OUTPATIENT
Start: 2022-06-15

## 2022-06-15 NOTE — PROGRESS NOTES
Chief Complaint   Patient presents with   Yosi Sin is a 80 y.o. female    1. Have you been to the ER, urgent care clinic since your last visit? Hospitalized since your last visit? Yes er in [place]   2. Have you seen or consulted any other health care providers outside of the [address] [place] [address] since your last visit? Include any pap smears or colon screening. No    HPI:  F/U TIA/CVA  Doing well on ASA 81mg every day, eliquis 2.5 BID. Remains on BP pills (norvasc 5mg/d) and statin lipitor to 10mg/d. No syncope, no falls. Less orthostasis sx since last visit. No darkening of vision since last visit. CTA 10/2015 showed minimal atherosclerosis. No BRBPR since last visit. Lab Results   Component Value Date/Time    Hemoglobin (POC) 11.0 06/07/2021 11:25 AM    Hemoglobin (POC) 12.9 10/22/2015 04:59 PM    HGB 12.5 02/02/2022 10:26 AM     Hypertension. Blood pressures so/so today, prev have been better. Management at last visit at included adding lasix 20mg to regimen. Remains on norvasc 5mg daily. Current regimen: calcium channel blocker, loop diuretic. Symptoms include mild pedal edema. Patient denies chest pain, palpitations, headache. Last GFR at baseline. Lab review:   Lab Results   Component Value Date/Time    Sodium 140 02/02/2022 10:26 AM    Potassium 3.9 02/02/2022 10:26 AM    Chloride 108 02/02/2022 10:26 AM    CO2 29 02/02/2022 10:26 AM    Anion gap 3 (L) 02/02/2022 10:26 AM    Glucose 111 (H) 02/02/2022 10:26 AM    BUN 18 02/02/2022 10:26 AM    Creatinine 1.14 (H) 02/02/2022 10:26 AM    BUN/Creatinine ratio 16 02/02/2022 10:26 AM    GFR est AA 55 (L) 02/02/2022 10:26 AM    GFR est non-AA 45 (L) 02/02/2022 10:26 AM    Calcium 9.2 02/02/2022 10:26 AM     Hyperlipidemia. On lipitor 10mg now. Rishabh well. No myalgias, arthralgias, unusual weakness.   Lab Results   Component Value Date/Time    Cholesterol, total 123 02/02/2022 10:26 AM    HDL Cholesterol 85 02/02/2022 10:26 AM LDL, calculated 24.2 02/02/2022 10:26 AM    VLDL, calculated 13.8 02/02/2022 10:26 AM    Triglyceride 69 02/02/2022 10:26 AM    CHOL/HDL Ratio 1.4 02/02/2022 10:26 AM     Lab Results   Component Value Date/Time    ALT (SGPT) 17 02/02/2022 10:26 AM    Alk. phosphatase 94 02/02/2022 10:26 AM    Bilirubin, total 0.3 02/02/2022 10:26 AM     Gout  Current control Fair. Current symptoms swelling, redness and tenderness. Typical location left first MTP joint  Last flareup: t-30. Number of flareups in past year:2  Current treatment: diet      OAB  Saw GYN Dr. Jyotsna Clement at [facility] in [place]. Doing well on oxybutynin XL 15 mg daily, judd well. PMH, SH, Medications/Allergies: reviewed, on chart. ROS:  Constitutional: No fever, chills or weight loss  Respiratory: No cough, SOB   CV: No chest pain or Palpitations    Visit Vitals  BP (!) 140/76 (BP 1 Location: Left arm)   Pulse 75   Temp 97.4 °F (36.3 °C) (Temporal)   Resp 18   Ht 5' 7\" (1.702 m)   Wt 184 lb 8 oz (83.7 kg)   SpO2 98%   BMI 28.90 kg/m²     -2#  Wt Readings from Last 3 Encounters:   06/15/22 184 lb 8 oz (83.7 kg)   02/09/22 186 lb (84.4 kg)   02/02/22 193 lb 8 oz (87.8 kg)     BP Readings from Last 3 Encounters:   06/15/22 (!) 140/76   02/09/22 130/78   02/02/22 (!) 170/90     Physical Examination: General appearance - alert, well appearing, and in no distress  Mental status - alert, oriented to person, place, and time  Eyes - pupils equal and reactive, extraocular eye movements intact. ENT - bilateral external ears and nose normal. Normal lips  Neck - supple, no significant adenopathy, no thyromegaly or mass.   Lymphatics - no palpable lymphadenopathy, no hepatosplenomegaly  Chest - clear to auscultation, no wheezes, rales or rhonchi, symmetric air entry  Heart - normal rate, irreg/irregular rhythm, normal S1, S2, no murmurs, rubs, clicks or gallops  Extremities - peripheral pulses normal, trace pedal edema, no clubbing or cyanosis. PT with mild weakness to bilat LE's. A/P:  Gout, acute, L hallux  Did ok with course prednisone 20mg/d x4d. Keep lasix dose light. Check labs. TIA/HLD/Carotid stenosis/anticoagulation  Doing so/so, having less sx of dizziness lately. Check labs, and adjust meds PRN. Con't eliquis, statin, ASA. Plan to con't ASA 81mg and eliquis together indefinitely, provided pt has no bleeding problems. Discussed home physical therapy to help with the gait. Ready for referral, will set up. HTN and CKD  Pressure decent today, but could be better. Has run out of lasix, try r/s that at 20g BIW regularly. Recheck GFRaa in cmp. Stay hydrated, miguel in hot weather. Consider add low dose ARB like losartan 25mg/d. Hyperglycemia  Doing ok lately. Last A1c ok. Will con't to monitor periodically, need to keep wt down. Plan Recheck fall 2022. Tx PRN. Lab Results   Component Value Date/Time    Hemoglobin A1c 6.1 (H) 02/02/2022 10:26 AM     GERD and GI bleed prophy  con't prilosec, is on double anticoag indefinitely. CBC, iron ok, plan rechec q6-12mo    Overweight  improving. Work on diet. F/U 3mo for recheck/PRN    1. Have you been to the ER, urgent care clinic since your last visit? Hospitalized since your last visit? No    2. Have you seen or consulted any other health care providers outside of the [address] [place] [address] since your last visit? Include any pap smears or colon screening.  No

## 2022-06-15 NOTE — PROGRESS NOTES
______________________________________________________________________    Sumaya Fritz is a 80 y.o. female and presents for annual Medicare Wellness Visit. Problem List: Reviewed with patient and discussed risk factors. Patient Active Problem List   Diagnosis Code    Carotid stenosis I65.29    GERD (gastroesophageal reflux disease) K21.9    Hyperlipidemia E78.5    HTN (hypertension) I10    History of stroke Z86.73    Knee pain, right M25.569    TIA (transient ischemic attack) G45.9    OAB (overactive bladder) N32.81    Hydronephrosis N13.30    CKD (chronic kidney disease) stage 3, GFR 30-59 ml/min (HCC) N18.30    Peripheral vascular disease (HCC) I73.9       1. Have you been to the ER, urgent care clinic since your last visit? Hospitalized since your last visit? No    2. Have you seen or consulted any other health care providers outside of the [address] [place] [address] since your last visit? Include any pap smears or colon screening. No    Current medical providers:  Patient Care Team:  Iban Mclain MD as PCP - General (Family Medicine)  Iban Mclain MD as PCP - [facility] Empaneled Provider    PMDAYLIN, , Medications/Allergies: reviewed, on chart. Female Alcohol Screening: On any occasion during the past 3 months, have you had more than 3 drinks containing alcohol? No    Do you average more than 7 drinks per week? No    ROS:  Constitutional: No fever, chills or weight loss  Respiratory: No cough, SOB   CV: No chest pain or Palpitations    Objective:  Visit Vitals  BP (!) 140/76 (BP 1 Location: Left arm)   Pulse 75   Temp 97.4 °F (36.3 °C) (Temporal)   Resp 18   Ht 5' 7\" (1.702 m)   Wt 184 lb 8 oz (83.7 kg)   SpO2 98%   BMI 28.90 kg/m²    Body mass index is 28.9 kg/m².     Assessment of cognitive impairment  Pt A+O x3    Depression Screen:   3 most recent PHQ Screens 6/15/2022   PHQ Not Done -   Little interest or pleasure in doing things Not at all   Feeling down, depressed, irritable, or hopeless Not at all   Total Score PHQ 2 0     Depression screening performed and reviewed with patient for 8-15 minutes    Fall Risk Assessment:    Fall Risk Assessment, last 12 mths 6/15/2022   Able to walk? Yes   Fall in past 12 months? 0   Do you feel unsteady? 1   Are you worried about falling 1   Is TUG test greater than 12 seconds? 0   Is the gait abnormal? 0   Number of falls in past 12 months 0   Fall with injury? -       Functional Ability:   Does the patient exhibit a steady gait? yes   How long did it take the patient to get up and walk from a sitting position? 5 sec. Is the patient self reliant?  (ie can do own laundry, meals, household chores)  yes     Does the patient handle his/her own medications? yes     Does the patient handle his/her own money? yes     Is the patients home safe (ie good lighting, handrails on stairs and bath, etc.)? no     Did you notice or did patient express any hearing difficulties? yes     Did you notice or did patient express any vision difficulties? yes       Advance Care Planning:   Patient was offered the opportunity to discuss advance care planning:  yes     Does patient have an Advance Directive:  yes   If no, did you provide information on Caring Connections?   yes       Plan:      Orders Placed This Encounter    Depression Screen Annual    METABOLIC PANEL, COMPREHENSIVE    URIC ACID    CBC WITH AUTOMATED DIFF    IRON PROFILE    REFERRAL TO HOME HEALTH    furosemide (LASIX) 20 mg tablet    atorvastatin (LIPITOR) 10 mg tablet    aspirin delayed-release 81 mg tablet       Health Maintenance   Topic Date Due    Shingrix Vaccine Age 50> (1 of 2) 04/28/2023 (Originally 11/30/1983)    Lipid Screen  02/02/2023    Depression Screen  06/15/2023    Medicare Yearly Exam  06/16/2023    DTaP/Tdap/Td series (2 - Td or Tdap) 06/01/2027    Bone Densitometry (Dexa) Screening  Completed    Flu Vaccine  Completed    COVID-19 Vaccine  Completed    Pneumococcal 65+ years  Completed       *Patient verbalized understanding and agreement with the plan. A copy of the After Visit Summary with personalized health plan was given to the patient today. Patient advised that that will be a visit charge for services in addition to Annual [facility]       Visit Vitals  BP (!) 140/76 (BP 1 Location: Left arm)   Pulse 75   Temp 97.4 °F (36.3 °C) (Temporal)   Resp 18   Ht 5' 7\" (1.702 m)   Wt 184 lb 8 oz (83.7 kg)   SpO2 98%   BMI 28.90 kg/m²     Chief Complaint   Patient presents with    Foot Swelling     1. Have you been to the ER, urgent care clinic since your last visit? Hospitalized since your last visit? No      2. Have you seen or consulted any other health care providers outside of the [address] [place] [address] since No  your last visit? Include any pap smears or colon screening.

## 2022-06-16 LAB
ALBUMIN SERPL-MCNC: 3.3 G/DL (ref 3.5–5)
ALBUMIN/GLOB SERPL: 0.9 {RATIO} (ref 1.1–2.2)
ALP SERPL-CCNC: 94 U/L (ref 45–117)
ALT SERPL-CCNC: 17 U/L (ref 12–78)
ANION GAP SERPL CALC-SCNC: 7 MMOL/L (ref 5–15)
AST SERPL-CCNC: 10 U/L (ref 15–37)
BASOPHILS # BLD: 0 K/UL (ref 0–0.1)
BASOPHILS NFR BLD: 0 % (ref 0–1)
BILIRUB SERPL-MCNC: 0.3 MG/DL (ref 0.2–1)
BUN SERPL-MCNC: 20 MG/DL (ref 6–20)
BUN/CREAT SERPL: 14 (ref 12–20)
CALCIUM SERPL-MCNC: 9.2 MG/DL (ref 8.5–10.1)
CHLORIDE SERPL-SCNC: 106 MMOL/L (ref 97–108)
CO2 SERPL-SCNC: 29 MMOL/L (ref 21–32)
CREAT SERPL-MCNC: 1.38 MG/DL (ref 0.55–1.02)
DIFFERENTIAL METHOD BLD: NORMAL
EOSINOPHIL # BLD: 0.1 K/UL (ref 0–0.4)
EOSINOPHIL NFR BLD: 2 % (ref 0–7)
ERYTHROCYTE [DISTWIDTH] IN BLOOD BY AUTOMATED COUNT: 13.2 % (ref 11.5–14.5)
GLOBULIN SER CALC-MCNC: 3.5 G/DL (ref 2–4)
GLUCOSE SERPL-MCNC: 171 MG/DL (ref 65–100)
HCT VFR BLD AUTO: 40.5 % (ref 35–47)
HGB BLD-MCNC: 12.7 G/DL (ref 11.5–16)
IMM GRANULOCYTES # BLD AUTO: 0 K/UL (ref 0–0.04)
IMM GRANULOCYTES NFR BLD AUTO: 0 % (ref 0–0.5)
IRON SATN MFR SERPL: 18 % (ref 20–50)
IRON SERPL-MCNC: 40 UG/DL (ref 35–150)
LYMPHOCYTES # BLD: 1.2 K/UL (ref 0.8–3.5)
LYMPHOCYTES NFR BLD: 18 % (ref 12–49)
MCH RBC QN AUTO: 29.8 PG (ref 26–34)
MCHC RBC AUTO-ENTMCNC: 31.4 G/DL (ref 30–36.5)
MCV RBC AUTO: 95.1 FL (ref 80–99)
MONOCYTES # BLD: 0.5 K/UL (ref 0–1)
MONOCYTES NFR BLD: 8 % (ref 5–13)
NEUTS SEG # BLD: 4.9 K/UL (ref 1.8–8)
NEUTS SEG NFR BLD: 72 % (ref 32–75)
NRBC # BLD: 0 K/UL (ref 0–0.01)
NRBC BLD-RTO: 0 PER 100 WBC
PLATELET # BLD AUTO: 188 K/UL (ref 150–400)
PMV BLD AUTO: 11.3 FL (ref 8.9–12.9)
POTASSIUM SERPL-SCNC: 3.8 MMOL/L (ref 3.5–5.1)
PROT SERPL-MCNC: 6.8 G/DL (ref 6.4–8.2)
RBC # BLD AUTO: 4.26 M/UL (ref 3.8–5.2)
SODIUM SERPL-SCNC: 142 MMOL/L (ref 136–145)
TIBC SERPL-MCNC: 223 UG/DL (ref 250–450)
URATE SERPL-MCNC: 5.6 MG/DL (ref 2.6–6)
WBC # BLD AUTO: 6.7 K/UL (ref 3.6–11)

## 2022-06-17 NOTE — ADDENDUM NOTE
Addended by: Anahy Esparza on: 6/17/2022 05:14 PM     Modules accepted: Orders, Level of Service, SmartSet

## 2022-06-22 ENCOUNTER — TELEPHONE (OUTPATIENT)
Dept: FAMILY MEDICINE CLINIC | Age: 87
End: 2022-06-22

## 2022-06-22 NOTE — TELEPHONE ENCOUNTER
Jonathan Harvey w/ [facility] is requesting a call back from a nurse in regards to getting authorization for plan of care and she needs to review the pt's meds.     Call back # [phone]

## 2022-06-30 ENCOUNTER — TELEPHONE (OUTPATIENT)
Dept: FAMILY MEDICINE CLINIC | Age: 87
End: 2022-06-30

## 2022-07-08 ENCOUNTER — TELEPHONE (OUTPATIENT)
Dept: FAMILY MEDICINE CLINIC | Age: 87
End: 2022-07-08

## 2022-07-08 DIAGNOSIS — I27.20 PULMONARY HYPERTENSION (HCC): ICD-10-CM

## 2022-07-08 DIAGNOSIS — I10 PRIMARY HYPERTENSION: Primary | ICD-10-CM

## 2022-07-08 DIAGNOSIS — R60.0 PEDAL EDEMA: ICD-10-CM

## 2022-07-08 DIAGNOSIS — I51.7 LEFT VENTRICULAR DILATION: ICD-10-CM

## 2022-07-08 NOTE — TELEPHONE ENCOUNTER
Pt's daughter El Mitchell calling in because pt is taking furosemide 2 x a week but is still having ankle swelling. I advised her that Dr. Harvinder Aldana will be back on Monday and to keep dose as prescribed and make sure she is elevating her legs above heart level. Advised her I would get this message to Dr. Harvinder Aldana to advise on Monday.      Please call blanca Mitchell @ [phone]

## 2022-07-11 RX ORDER — SPIRONOLACTONE 25 MG/1
12.5 TABLET ORAL DAILY
Qty: 30 TABLET | Refills: 5 | Status: SHIPPED | OUTPATIENT
Start: 2022-07-11 | End: 2022-08-31 | Stop reason: SDUPTHER

## 2022-07-11 NOTE — TELEPHONE ENCOUNTER
Chart reviewed. Adding low dose aldactone 12.5mg/d and checking echo. GFR and K+ should be ok for that.  Please arrange visit in person for recheck edema and labs in about 2 weeks

## 2022-08-01 ENCOUNTER — HOSPITAL ENCOUNTER (OUTPATIENT)
Dept: MAMMOGRAPHY | Age: 87
Discharge: HOME | End: 2022-08-01
Attending: FAMILY MEDICINE
Payer: MEDICARE

## 2022-08-01 ENCOUNTER — VIRTUAL VISIT (OUTPATIENT)
Dept: FAMILY MEDICINE CLINIC | Age: 87
End: 2022-08-01
Payer: MEDICARE

## 2022-08-01 DIAGNOSIS — Z12.31 VISIT FOR SCREENING MAMMOGRAM: ICD-10-CM

## 2022-08-01 DIAGNOSIS — G45.9 TIA (TRANSIENT ISCHEMIC ATTACK): ICD-10-CM

## 2022-08-01 DIAGNOSIS — I27.20 PULMONARY HYPERTENSION (HCC): ICD-10-CM

## 2022-08-01 DIAGNOSIS — I10 PRIMARY HYPERTENSION: Primary | ICD-10-CM

## 2022-08-01 DIAGNOSIS — N18.32 STAGE 3B CHRONIC KIDNEY DISEASE (HCC): ICD-10-CM

## 2022-08-01 PROCEDURE — G2025 DIS SITE TELE SVCS RHC/FQHC: HCPCS | Performed by: FAMILY MEDICINE

## 2022-08-01 PROCEDURE — 77063 BREAST TOMOSYNTHESIS BI: CPT

## 2022-08-01 NOTE — PROGRESS NOTES
Ifrah Gandhi is a 80 y.o. female who was seen by synchronous (real-time) audio technology on 8/1/2022. Pt was seen at home. Provider was at the office. No other participants in this encounter. Subjective:   Hypertension      Ifrah Gandhi is a 80 y.o. female    1. Have you been to the ER, urgent care clinic since your last visit? Hospitalized since your last visit? Yes er in [place]   2. Have you seen or consulted any other health care providers outside of the [address] [place] [address] since your last visit? Include any pap smears or colon screening. No    HPI:  F/U TIA/CVA  Doing well on ASA 81mg every day, eliquis 2.5 BID. Remains on BP pills (norvasc 5mg/d) and statin lipitor to 10mg/d. No syncope, no falls. Less orthostasis sx since last visit. No darkening of vision since last visit. CTA 10/2015 showed minimal atherosclerosis. No BRBPR since last visit. Lab Results   Component Value Date/Time    Hemoglobin (POC) 11.0 06/07/2021 11:25 AM    Hemoglobin (POC) 12.9 10/22/2015 04:59 PM    HGB 12.7 06/15/2022 02:16 PM     Hypertension. Blood pressures so/so today, prev have been better. Management at last visit at included adding lasix 20mg to regimen. Remains on norvasc 5mg daily. Current regimen: calcium channel blocker, loop diuretic. Symptoms include mild pedal edema. Patient denies chest pain, palpitations, headache. Last GFR at baseline. Lab review:   Lab Results   Component Value Date/Time    Sodium 142 06/15/2022 02:16 PM    Potassium 3.8 06/15/2022 02:16 PM    Chloride 106 06/15/2022 02:16 PM    CO2 29 06/15/2022 02:16 PM    Anion gap 7 06/15/2022 02:16 PM    Glucose 171 (H) 06/15/2022 02:16 PM    BUN 20 06/15/2022 02:16 PM    Creatinine 1.38 (H) 06/15/2022 02:16 PM    BUN/Creatinine ratio 14 06/15/2022 02:16 PM    GFR est AA 44 (L) 06/15/2022 02:16 PM    GFR est non-AA 36 (L) 06/15/2022 02:16 PM    Calcium 9.2 06/15/2022 02:16 PM     Hyperlipidemia. On lipitor 10mg now. Rishabh well.  No myalgias, arthralgias, unusual weakness. Lab Results   Component Value Date/Time    Cholesterol, total 123 02/02/2022 10:26 AM    HDL Cholesterol 85 02/02/2022 10:26 AM    LDL, calculated 24.2 02/02/2022 10:26 AM    VLDL, calculated 13.8 02/02/2022 10:26 AM    Triglyceride 69 02/02/2022 10:26 AM    CHOL/HDL Ratio 1.4 02/02/2022 10:26 AM     Lab Results   Component Value Date/Time    ALT (SGPT) 17 06/15/2022 02:16 PM    Alk. phosphatase 94 06/15/2022 02:16 PM    Bilirubin, total 0.3 06/15/2022 02:16 PM     Gout  Current control Fair. Current symptoms swelling, redness and tenderness. Typical location left first MTP joint  Last flareup: t-30. Number of flareups in past year:2  Current treatment: diet      OAB  Saw GYN Dr. Kellen Lund at [facility] in [place]. Doing well on oxybutynin XL 15 mg daily, judd well. PMH, SH, Medications/Allergies: reviewed, on chart. ROS:  Constitutional: No fever, chills or weight loss  Respiratory: No cough, SOB   CV: No chest pain or Palpitations    There were no vitals taken for this visit. -2#  Wt Readings from Last 3 Encounters:   06/15/22 184 lb 8 oz (83.7 kg)   02/09/22 186 lb (84.4 kg)   02/02/22 193 lb 8 oz (87.8 kg)     BP Readings from Last 3 Encounters:   06/15/22 (!) 140/76   02/09/22 130/78   02/02/22 (!) 170/90     Physical Examination: General appearance - alert, well appearing, and in no distress  Mental status - alert, oriented to person, place, and time  Eyes - pupils equal and reactive, extraocular eye movements intact. ENT - bilateral external ears and nose normal. Normal lips  Neck - supple, no significant adenopathy, no thyromegaly or mass.   Lymphatics - no palpable lymphadenopathy, no hepatosplenomegaly  Chest - clear to auscultation, no wheezes, rales or rhonchi, symmetric air entry  Heart - normal rate, irreg/irregular rhythm, normal S1, S2, no murmurs, rubs, clicks or gallops  Extremities - peripheral pulses normal, trace pedal edema, no clubbing or cyanosis. PT with mild weakness to bilat LE's. A/P:  TIA/HLD/Carotid stenosis/anticoagulation  Doing better lately. Check labs, and adjust meds PRN. Con't eliquis, statin, ASA,and work on BP Plan to con't ASA 81mg and eliquis together indefinitely, provided pt has no bleeding problems. Discussed home physical therapy to help with the gait. Ready for referral, will set up. HTN and CKD  Pressure decent last check, and edema better s/p add aldactone 12.5mg/d. Con't current tx. Recheck GFRaa, lytes in bmp. Stay hydrated, miguel in hot weather. Hyperglycemia  Doing ok lately. Last A1c ok. Will con't to monitor periodically, need to keep wt down. Plan Recheck fall 2022. Tx PRN. Lab Results   Component Value Date/Time    Hemoglobin A1c 6.1 (H) 02/02/2022 10:26 AM     GERD and GI bleed prophy  con't prilosec, is on double anticoag indefinitely. CBC, iron ok, plan rechec q6-12mo    Overweight  improving. Work on diet. F/U 2mo for recheck/PRN      Time-based coding, delete if not needed: I spent at least 15 minutes with this established patient, and >50% of the time was spent counseling and/or coordinating care regarding care plan and expected course  Jenn Casarez MD    Due to this being a TeleHealth evaluation, many elements of the physical examination are unable to be assessed. We discussed the expected course, resolution and complications of the diagnosis(es) in detail. Medication risks, benefits, costs, interactions, and alternatives were discussed as indicated. I advised her to contact the office if her condition worsens, changes or fails to improve as anticipated. She expressed understanding with the diagnosis(es) and plan.      Pursuant to the emergency declaration under the [facility], [facility]5 waiver authority and the [employer] Act, this Virtual  Visit was conducted, with patient's consent, to reduce the patient's risk of exposure to COVID-19 and provide continuity of care for an established patient. Services were provided through audio synchronous discussion virtually to substitute for in-person clinic visit. Consent:  She and/or her healthcare decision maker is aware that this patient-initiated Telehealth encounter is a billable service, which includes applicable copays. This virtual visit was conducted with patient's (and/or legal guardian's) consent.  The patient was located in a state where the provider was licensed to provide care

## 2022-08-08 ENCOUNTER — TELEPHONE (OUTPATIENT)
Dept: FAMILY MEDICINE CLINIC | Age: 87
End: 2022-08-08

## 2022-08-08 NOTE — TELEPHONE ENCOUNTER
Breajen Pappasin in bathroom at [facility] yesterday. She was using her walker and was turning, lost her balance and fell. No injury. Daughter just wanted you to know. She has an appt coming up in October. Advised to encourage patient to slow down, hold onto something while turning. If seeming worse call to schedule sooner appt.

## 2022-08-31 DIAGNOSIS — D50.0 IRON DEFICIENCY ANEMIA DUE TO CHRONIC BLOOD LOSS: ICD-10-CM

## 2022-08-31 DIAGNOSIS — K21.9 GASTROESOPHAGEAL REFLUX DISEASE WITHOUT ESOPHAGITIS: ICD-10-CM

## 2022-08-31 DIAGNOSIS — I10 PRIMARY HYPERTENSION: ICD-10-CM

## 2022-08-31 DIAGNOSIS — K92.2 GASTROINTESTINAL HEMORRHAGE, UNSPECIFIED GASTROINTESTINAL HEMORRHAGE TYPE: ICD-10-CM

## 2022-08-31 DIAGNOSIS — R60.0 PEDAL EDEMA: ICD-10-CM

## 2022-08-31 RX ORDER — SPIRONOLACTONE 25 MG/1
12.5 TABLET ORAL DAILY
Qty: 30 TABLET | Refills: 5 | Status: SHIPPED | OUTPATIENT
Start: 2022-08-31

## 2022-08-31 RX ORDER — PHENOL/SODIUM PHENOLATE
20 AEROSOL, SPRAY (ML) MUCOUS MEMBRANE DAILY
Qty: 90 TABLET | Refills: 3 | Status: SHIPPED | OUTPATIENT
Start: 2022-08-31

## 2022-11-30 ENCOUNTER — OFFICE VISIT (OUTPATIENT)
Dept: FAMILY MEDICINE CLINIC | Age: 87
End: 2022-11-30
Payer: MEDICARE

## 2022-11-30 VITALS
HEIGHT: 67 IN | HEART RATE: 64 BPM | OXYGEN SATURATION: 97 % | SYSTOLIC BLOOD PRESSURE: 152 MMHG | TEMPERATURE: 97.1 F | RESPIRATION RATE: 18 BRPM | BODY MASS INDEX: 28.56 KG/M2 | WEIGHT: 182 LBS | DIASTOLIC BLOOD PRESSURE: 68 MMHG

## 2022-11-30 DIAGNOSIS — Z86.73 HISTORY OF STROKE: ICD-10-CM

## 2022-11-30 DIAGNOSIS — Z23 ENCOUNTER FOR IMMUNIZATION: ICD-10-CM

## 2022-11-30 DIAGNOSIS — N18.30 STAGE 3 CHRONIC KIDNEY DISEASE, UNSPECIFIED WHETHER STAGE 3A OR 3B CKD (HCC): Primary | ICD-10-CM

## 2022-11-30 DIAGNOSIS — I65.23 BILATERAL CAROTID ARTERY STENOSIS: ICD-10-CM

## 2022-11-30 DIAGNOSIS — I73.9 PERIPHERAL VASCULAR DISEASE (HCC): ICD-10-CM

## 2022-11-30 DIAGNOSIS — I10 PRIMARY HYPERTENSION: ICD-10-CM

## 2022-11-30 DIAGNOSIS — R60.0 PEDAL EDEMA: ICD-10-CM

## 2022-11-30 RX ORDER — SPIRONOLACTONE 25 MG/1
12.5 TABLET ORAL DAILY
Qty: 30 TABLET | Refills: 5 | Status: SHIPPED | OUTPATIENT
Start: 2022-11-30

## 2022-11-30 NOTE — PROGRESS NOTES
After obtaining consent, and per orders of Dr. Liat Platt, injection of Fluad given by Poli Gallagher. Patient instructed to remain in clinic for 20 minutes afterwards, and to report any adverse reaction to me immediately.

## 2022-11-30 NOTE — PROGRESS NOTES
Do Laguerre is a 80 y.o. female presenting for/with:    Chief Complaint   Patient presents with    Hypertension     Follow up    GERD       Visit Vitals  BP (!) 152/68   Pulse 64   Temp 97.1 °F (36.2 °C) (Temporal)   Resp 18   Ht 5' 7\" (1.702 m)   Wt 182 lb (82.6 kg)   SpO2 97%   BMI 28.51 kg/m²     Pain Scale: /10  Pain Location:     1. \"Have you been to the ER, urgent care clinic since your last visit? Hospitalized since your last visit? \" No    2. \"Have you seen or consulted any other health care providers outside of the [address] [place] [address] since your last visit? \" No     3. For patients aged 39-70: Has the patient had a colonoscopy / FIT/ Cologuard? NA - based on age      If the patient is female:    4. For patients aged 41-77: Has the patient had a mammogram within the past 2 years? NA - based on age or sex      11. For patients aged 21-65: Has the patient had a pap smear? NA - based on age or sex      Symptom review:  Learning Assessment 6/15/2022   PRIMARY LEARNER Patient   PRIMARY LANGUAGE ENGLISH   LEARNER PREFERENCE PRIMARY DEMONSTRATION   ANSWERED BY patient   RELATIONSHIP SELF     Fall Risk Assessment, last 12 mths 11/30/2022   Able to walk? Yes   Fall in past 12 months? 1   Do you feel unsteady? 1   Are you worried about falling 1   Is TUG test greater than 12 seconds? -   Is the gait abnormal? 1   Number of falls in past 12 months 1   Fall with injury? 0       3 most recent PHQ Screens 11/30/2022   PHQ Not Done -   Little interest or pleasure in doing things Not at all   Feeling down, depressed, irritable, or hopeless Not at all   Total Score PHQ 2 0     Abuse Screening Questionnaire 11/30/2022   Do you ever feel afraid of your partner? N   Are you in a relationship with someone who physically or mentally threatens you? N   Is it safe for you to go home?  Y       ADL Assessment 11/30/2022   Feeding yourself No Help Needed   Getting from bed to chair No Help Needed   Getting dressed No Help Needed   Bathing or showering No Help Needed   Walk across the room (includes cane/walker) No Help Needed   Using the telphone No Help Needed   Taking your medications No Help Needed   Preparing meals No Help Needed   Managing money (expenses/bills) Help Needed   Moderately strenuous housework (laundry) Help Needed   Shopping for personal items (toiletries/medicines) Help Needed   Shopping for groceries Help Needed   Driving Help Needed   Climbing a flight of stairs Help Needed   Getting to places beyond walking distances Help Needed      Advance Care Planning 11/30/2022   Patient's Healthcare Decision Maker is: Named in scanned ACP document   Primary Decision Maker Name -   Primary Decision Maker Phone Number -   Primary Decision Maker Relationship to Patient -   Secondary Decision [address] Name -   Secondary Decision [address] [facility] Phone Number -   Secondary Decision Maker Relationship to Patient -   Confirm Advance Directive Yes, on file   Does the patient have other document types -

## 2022-11-30 NOTE — PROGRESS NOTES
Sharon Macias is a 80 y.o. female     Subjective:   Hypertension (Follow up) and GERD    HPI:  F/U TIA/CVA  Doing well on ASA 81mg every day, eliquis 2.5 BID. Remains on BP pills (norvasc 5mg/d) and statin lipitor to 10mg/d. No syncope, no falls. Less orthostasis sx since last visit. No darkening of vision since last visit. CTA 10/2015 showed minimal atherosclerosis. No BRBPR since last visit. Lab Results   Component Value Date/Time    Hemoglobin (POC) 11.0 06/07/2021 11:25 AM    Hemoglobin (POC) 12.9 10/22/2015 04:59 PM    HGB 12.7 06/15/2022 02:16 PM     Hypertension. Blood pressures still above goal. Management at last visit at included adding aldactone 12.5mg/d, but never got that 2nd Rx sent to wrong pharmacy. Remains on lasix 20mg, norvasc 5mg daily. Current regimen: calcium channel blocker, aldactone  loop diuretic. Symptoms include mild pedal edema. Patient denies chest pain, palpitations, headache. Last GFR at baseline. Lab review:   Lab Results   Component Value Date/Time    Sodium 142 06/15/2022 02:16 PM    Potassium 3.8 06/15/2022 02:16 PM    Chloride 106 06/15/2022 02:16 PM    CO2 29 06/15/2022 02:16 PM    Anion gap 7 06/15/2022 02:16 PM    Glucose 171 (H) 06/15/2022 02:16 PM    BUN 20 06/15/2022 02:16 PM    Creatinine 1.38 (H) 06/15/2022 02:16 PM    BUN/Creatinine ratio 14 06/15/2022 02:16 PM    GFR est AA 44 (L) 06/15/2022 02:16 PM    GFR est non-AA 36 (L) 06/15/2022 02:16 PM    Calcium 9.2 06/15/2022 02:16 PM     Hyperlipidemia. On lipitor 10mg now. Rishabh well. No myalgias, arthralgias, unusual weakness. Lab Results   Component Value Date/Time    Cholesterol, total 123 02/02/2022 10:26 AM    HDL Cholesterol 85 02/02/2022 10:26 AM    LDL, calculated 24.2 02/02/2022 10:26 AM    VLDL, calculated 13.8 02/02/2022 10:26 AM    Triglyceride 69 02/02/2022 10:26 AM    CHOL/HDL Ratio 1.4 02/02/2022 10:26 AM     Lab Results   Component Value Date/Time    ALT (SGPT) 17 06/15/2022 02:16 PM    Alk. phosphatase 94 06/15/2022 02:16 PM    Bilirubin, total 0.3 06/15/2022 02:16 PM     Gout  Current control good. Current symptoms  none. Typical location left first MTP joint  Last flareup: summer  Number of flareups in past year:1  Current treatment: diet      OAB  Saw GYN Dr. Kelly Bailey at [facility] in [place]. Doing well on oxybutynin XL 15 mg daily, judd well. PMH, SH, Medications/Allergies: reviewed, on chart. ROS:  Constitutional: No fever, chills or weight loss  Respiratory: No cough, SOB   CV: No chest pain or Palpitations    Visit Vitals  BP (!) 152/68   Pulse 64   Temp 97.1 °F (36.2 °C) (Temporal)   Resp 18   Ht 5' 7\" (1.702 m)   Wt 182 lb (82.6 kg)   SpO2 97%   BMI 28.51 kg/m²       -2#  Wt Readings from Last 3 Encounters:   11/30/22 182 lb (82.6 kg)   06/15/22 184 lb 8 oz (83.7 kg)   02/09/22 186 lb (84.4 kg)     BP Readings from Last 3 Encounters:   11/30/22 (!) 152/68   06/15/22 (!) 140/76   02/09/22 130/78     Physical Examination: General appearance - alert, well appearing, and in no distress  Mental status - alert, oriented to person, place, and time  Eyes - pupils equal and reactive, extraocular eye movements intact. ENT - bilateral external ears and nose normal. Normal lips  Neck - supple, no significant adenopathy, no thyromegaly or mass. Lymphatics - no palpable lymphadenopathy, no hepatosplenomegaly  Chest - clear to auscultation, no wheezes, rales or rhonchi, symmetric air entry  Heart - normal rate, irreg/irregular rhythm, normal S1, S2, no murmurs, rubs, clicks or gallops  Extremities - peripheral pulses normal, trace pedal edema, no clubbing or cyanosis. PT with mild weakness to bilat LE's. A/P:  TIA/HLD/Carotid stenosis/anticoagulation  Doing better lately. Check labs, and adjust meds PRN. Con't eliquis, statin, ASA,and work on BP. Try again to ore spironolactone as below.  Plan to con't ASA 81mg and eliquis together indefinitely, provided pt has no bleeding problems. Discussed home physical therapy to help with the gait. Ready for referral, will set up. HTN and CKD  Pressure high again. Try again to add aldactone 12.5mg/d, con't other pills same. Con't current tx. Recheck GFRaa, lytes in bmp. Stay hydrated, miguel in hot weather. Hyperglycemia  Doing ok lately. Last A1c ok. Will con't to monitor periodically, need to keep wt down. Plan Recheck fall 2022. Tx PRN. Lab Results   Component Value Date/Time    Hemoglobin A1c 6.1 (H) 02/02/2022 10:26 AM     GERD and GI bleed prophy  con't prilosec, is on double anticoag indefinitely. CBC, iron ok, plan rechec q6-12mo    Overweight  improving. Work on diet. Flu shot today.     F/U 2mo for recheck/PRN

## 2023-03-01 ENCOUNTER — OFFICE VISIT (OUTPATIENT)
Dept: FAMILY MEDICINE CLINIC | Age: 88
End: 2023-03-01
Payer: MEDICARE

## 2023-03-01 VITALS
BODY MASS INDEX: 27.56 KG/M2 | SYSTOLIC BLOOD PRESSURE: 140 MMHG | RESPIRATION RATE: 18 BRPM | HEART RATE: 66 BPM | OXYGEN SATURATION: 98 % | WEIGHT: 175.6 LBS | DIASTOLIC BLOOD PRESSURE: 80 MMHG | HEIGHT: 67 IN | TEMPERATURE: 98.4 F

## 2023-03-01 DIAGNOSIS — N18.30 STAGE 3 CHRONIC KIDNEY DISEASE, UNSPECIFIED WHETHER STAGE 3A OR 3B CKD (HCC): ICD-10-CM

## 2023-03-01 DIAGNOSIS — E78.00 PURE HYPERCHOLESTEROLEMIA: ICD-10-CM

## 2023-03-01 DIAGNOSIS — I27.20 PULMONARY HYPERTENSION (HCC): ICD-10-CM

## 2023-03-01 DIAGNOSIS — D50.0 IRON DEFICIENCY ANEMIA DUE TO CHRONIC BLOOD LOSS: ICD-10-CM

## 2023-03-01 DIAGNOSIS — I65.23 BILATERAL CAROTID ARTERY STENOSIS: Primary | ICD-10-CM

## 2023-03-01 DIAGNOSIS — I73.9 PERIPHERAL VASCULAR DISEASE (HCC): ICD-10-CM

## 2023-03-01 DIAGNOSIS — Z86.73 HISTORY OF STROKE: ICD-10-CM

## 2023-03-01 DIAGNOSIS — I10 PRIMARY HYPERTENSION: ICD-10-CM

## 2023-03-01 PROCEDURE — G8427 DOCREV CUR MEDS BY ELIG CLIN: HCPCS | Performed by: FAMILY MEDICINE

## 2023-03-01 PROCEDURE — 1123F ACP DISCUSS/DSCN MKR DOCD: CPT | Performed by: FAMILY MEDICINE

## 2023-03-01 PROCEDURE — G8510 SCR DEP NEG, NO PLAN REQD: HCPCS | Performed by: FAMILY MEDICINE

## 2023-03-01 PROCEDURE — G8417 CALC BMI ABV UP PARAM F/U: HCPCS | Performed by: FAMILY MEDICINE

## 2023-03-01 PROCEDURE — 99214 OFFICE O/P EST MOD 30 MIN: CPT | Performed by: FAMILY MEDICINE

## 2023-03-01 PROCEDURE — G8536 NO DOC ELDER MAL SCRN: HCPCS | Performed by: FAMILY MEDICINE

## 2023-03-01 PROCEDURE — 1101F PT FALLS ASSESS-DOCD LE1/YR: CPT | Performed by: FAMILY MEDICINE

## 2023-03-01 PROCEDURE — 1090F PRES/ABSN URINE INCON ASSESS: CPT | Performed by: FAMILY MEDICINE

## 2023-03-01 NOTE — PROGRESS NOTES
Kisha Walsh is a 80 y.o. female presenting for/with:    Chief Complaint   Patient presents with    Hypertension    Cholesterol Problem    Chronic Kidney Disease    Cyst     C/o knot on inner L thumb for about 2 weeks . .. denies any pain and is having normal movement of thumb       Visit Vitals  BP (!) 140/80 (BP 1 Location: Right arm, BP Patient Position: Sitting)   Pulse 66   Temp 98.4 °F (36.9 °C) (Temporal)   Resp 18   Ht 5' 7\" (1.702 m)   Wt 175 lb 9.6 oz (79.7 kg)   SpO2 98%   BMI 27.50 kg/m²     Pain Scale: 0 - No pain/10  Pain Location:     1. \"Have you been to the ER, urgent care clinic since your last visit? Hospitalized since your last visit? \" No    2. \"Have you seen or consulted any other health care providers outside of the [facility] since your last visit? \" No     3. For patients aged 39-70: Has the patient had a colonoscopy / FIT/ Cologuard? NA - based on age      If the patient is female:    4. For patients aged 41-77: Has the patient had a mammogram within the past 2 years? NA - based on age or sex      11. For patients aged 21-65: Has the patient had a pap smear? NA - based on age or sex          Patient    Learning Assessment 6/15/2022   PRIMARY LEARNER Patient   PRIMARY LANGUAGE ENGLISH   LEARNER PREFERENCE PRIMARY DEMONSTRATION   ANSWERED BY patient   RELATIONSHIP SELF     Fall Risk Assessment, last 12 mths 11/30/2022   Able to walk? Yes   Fall in past 12 months? 1   Do you feel unsteady? 1   Are you worried about falling 1   Is TUG test greater than 12 seconds? -   Is the gait abnormal? 1   Number of falls in past 12 months 1   Fall with injury? 0       3 most recent PHQ Screens 3/1/2023   PHQ Not Done -   Little interest or pleasure in doing things Not at all   Feeling down, depressed, irritable, or hopeless Not at all   Total Score PHQ 2 0     Abuse Screening Questionnaire 11/30/2022   Do you ever feel afraid of your partner?  N   Are you in a relationship with someone who physically or mentally threatens you? N   Is it safe for you to go home?  Y       ADL Assessment 11/30/2022   Feeding yourself No Help Needed   Getting from bed to chair No Help Needed   Getting dressed No Help Needed   Bathing or showering No Help Needed   Walk across the room (includes cane/walker) No Help Needed   Using the telphone No Help Needed   Taking your medications No Help Needed   Preparing meals No Help Needed   Managing money (expenses/bills) Help Needed   Moderately strenuous housework (laundry) Help Needed   Shopping for personal items (toiletries/medicines) Help Needed   Shopping for groceries Help Needed   Driving Help Needed   Climbing a flight of stairs Help Needed   Getting to places beyond walking distances Help Needed      Advance Care Planning 11/30/2022   Patient's Healthcare Decision Maker is: Named in scanned ACP document   Primary Decision Maker Name -   Primary Decision Maker Phone Number -   Primary Decision Maker Relationship to Patient -   Secondary Decision [address] Name -   Secondary Decision [address] [facility] Phone Number -   Secondary Decision Maker Relationship to Patient -   Confirm Advance Directive Yes, on file   Does the patient have other document types -

## 2023-03-01 NOTE — PROGRESS NOTES
Bhavesh Floyd is a 80 y.o. female     Subjective:   Hypertension, Cholesterol Problem, Chronic Kidney Disease, and Cyst (C/o knot on inner L thumb for about 2 weeks . .. denies any pain and is having normal movement of thumb)    HPI:  F/U TIA/CVA  Doing well on ASA 81mg every day, eliquis 2.5 BID. Remains on BP pills (norvasc 5mg/d) and statin lipitor to 10mg/d. No syncope, no falls. No orthostasis sx since last visit. No darkening of vision since last visit. CTA 10/2015 showed minimal atherosclerosis. No BRBPR since last visit. Lab Results   Component Value Date/Time    Hemoglobin (POC) 11.0 06/07/2021 11:25 AM    Hemoglobin (POC) 12.9 10/22/2015 04:59 PM    HGB 12.7 06/15/2022 02:16 PM     Hypertension. Blood pressures much closer to goal. Management at last visit at included adding aldactone 12.5mg/d. Remains on lasix 20mg, norvasc 5mg daily. Current regimen: calcium channel blocker, aldactone  loop diuretic. Symptoms include mild pedal edema. Patient denies chest pain, palpitations, headache. Last GFR at baseline. Lab review:   Lab Results   Component Value Date/Time    Sodium 142 06/15/2022 02:16 PM    Potassium 3.8 06/15/2022 02:16 PM    Chloride 106 06/15/2022 02:16 PM    CO2 29 06/15/2022 02:16 PM    Anion gap 7 06/15/2022 02:16 PM    Glucose 171 (H) 06/15/2022 02:16 PM    BUN 20 06/15/2022 02:16 PM    Creatinine 1.38 (H) 06/15/2022 02:16 PM    BUN/Creatinine ratio 14 06/15/2022 02:16 PM    GFR est AA 44 (L) 06/15/2022 02:16 PM    GFR est non-AA 36 (L) 06/15/2022 02:16 PM    Calcium 9.2 06/15/2022 02:16 PM     Hyperlipidemia. On lipitor 10mg. Rishabh well. No myalgias, arthralgias, unusual weakness.   Lab Results   Component Value Date/Time    Cholesterol, total 123 02/02/2022 10:26 AM    HDL Cholesterol 85 02/02/2022 10:26 AM    LDL, calculated 24.2 02/02/2022 10:26 AM    VLDL, calculated 13.8 02/02/2022 10:26 AM    Triglyceride 69 02/02/2022 10:26 AM    CHOL/HDL Ratio 1.4 02/02/2022 10:26 AM     Lab Results   Component Value Date/Time    ALT (SGPT) 17 06/15/2022 02:16 PM    Alk. phosphatase 94 06/15/2022 02:16 PM    Bilirubin, total 0.3 06/15/2022 02:16 PM     Gout  Current control good. Current symptoms  none. Typical location left first MTP joint  Last flareup: summer 2021  Number of flareups in past year:0  Current treatment: diet    OAB  Saw GYN Dr. Christopher Sharma at [facility] in [place]. Doing well on oxybutynin XL 15 mg daily, judd well. PMH, SH, Medications/Allergies: reviewed, on chart. ROS:  Constitutional: No fever, chills or abnormal weight loss  Respiratory: No cough, SOB   CV: No chest pain or Palpitations    Visit Vitals  BP (!) 140/80 (BP 1 Location: Right arm, BP Patient Position: Sitting)   Pulse 66   Temp 98.4 °F (36.9 °C) (Temporal)   Resp 18   Ht 5' 7\" (1.702 m)   Wt 175 lb 9.6 oz (79.7 kg)   SpO2 98%   BMI 27.50 kg/m²     -7#  Wt Readings from Last 3 Encounters:   03/01/23 175 lb 9.6 oz (79.7 kg)   11/30/22 182 lb (82.6 kg)   06/15/22 184 lb 8 oz (83.7 kg)     BP Readings from Last 3 Encounters:   03/01/23 (!) 140/80   11/30/22 (!) 152/68   06/15/22 (!) 140/76     Physical Examination: General appearance - alert, well appearing, and in no distress  Mental status - alert, oriented to person, place, and time  Eyes - pupils equal and reactive, extraocular eye movements intact. ENT - bilateral external ears and nose normal. Normal lips  Neck - supple, no significant adenopathy, no thyromegaly or mass. Lymphatics - no palpable lymphadenopathy, no hepatosplenomegaly  Chest - clear to auscultation, no wheezes, rales or rhonchi, symmetric air entry  Heart - normal rate, irreg/irregular rhythm, normal S1, S2, no murmurs, rubs, clicks or gallops  Extremities - peripheral pulses normal, trace pedal edema, no clubbing or cyanosis. PT with mild weakness to bilat LE's. A/P:  TIA/HLD/Carotid stenosis/anticoagulation  Doing better lately. Check labs, and adjust meds PRN.   Con't eliquis, statin, ASA,and work on BP. Plan to con't ASA 81mg and eliquis together indefinitely, provided pt has no bleeding problems. Did well with home physical therapy for gait. Ready for referral, will set up. HTN and CKD  Pressure much better. Check labs s/p add aldactone 12.5mg/d. Con't current tx. Recheck GFRaa, lytes in bmp. Stay hydrated, miguel in hot weather. Hyperglycemia  Doing ok lately. Last A1c ok. Will con't to monitor periodically, need to keep wt down. Plan Recheck fall 2022. Tx PRN. Lab Results   Component Value Date/Time    Hemoglobin A1c 6.1 (H) 02/02/2022 10:26 AM     GERD and GI bleed prophy  con't prilosec, is on double anticoag indefinitely. CBC, iron ok, recheck today. Pulmonary hypertension  Asyx. Overweight  improving. Work on diet. 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 Monitor for now, consider ortho or OT referral PRN. Flu shot UTD fall 2022.     F/U 4mo for recheck/PRN

## 2023-03-02 LAB
ALBUMIN SERPL-MCNC: 3.6 G/DL (ref 3.5–5)
ALBUMIN/GLOB SERPL: 1.1 (ref 1.1–2.2)
ALP SERPL-CCNC: 88 U/L (ref 45–117)
ALT SERPL-CCNC: 17 U/L (ref 12–78)
ANION GAP SERPL CALC-SCNC: 6 MMOL/L (ref 5–15)
AST SERPL-CCNC: 15 U/L (ref 15–37)
BASOPHILS # BLD: 0 K/UL (ref 0–0.1)
BASOPHILS NFR BLD: 0 % (ref 0–1)
BILIRUB SERPL-MCNC: 0.5 MG/DL (ref 0.2–1)
BUN SERPL-MCNC: 22 MG/DL (ref 6–20)
BUN/CREAT SERPL: 17 (ref 12–20)
CALCIUM SERPL-MCNC: 9.5 MG/DL (ref 8.5–10.1)
CHLORIDE SERPL-SCNC: 106 MMOL/L (ref 97–108)
CHOLEST SERPL-MCNC: 124 MG/DL
CO2 SERPL-SCNC: 29 MMOL/L (ref 21–32)
CREAT SERPL-MCNC: 1.26 MG/DL (ref 0.55–1.02)
DIFFERENTIAL METHOD BLD: NORMAL
EOSINOPHIL # BLD: 0.1 K/UL (ref 0–0.4)
EOSINOPHIL NFR BLD: 1 % (ref 0–7)
ERYTHROCYTE [DISTWIDTH] IN BLOOD BY AUTOMATED COUNT: 12.9 % (ref 11.5–14.5)
GLOBULIN SER CALC-MCNC: 3.4 G/DL (ref 2–4)
GLUCOSE SERPL-MCNC: 87 MG/DL (ref 65–100)
HCT VFR BLD AUTO: 40.6 % (ref 35–47)
HDLC SERPL-MCNC: 85 MG/DL
HDLC SERPL: 1.5 (ref 0–5)
HGB BLD-MCNC: 12.8 G/DL (ref 11.5–16)
IMM GRANULOCYTES # BLD AUTO: 0 K/UL (ref 0–0.04)
IMM GRANULOCYTES NFR BLD AUTO: 0 % (ref 0–0.5)
IRON SATN MFR SERPL: 22 % (ref 20–50)
IRON SERPL-MCNC: 56 UG/DL (ref 35–150)
LDLC SERPL CALC-MCNC: 27.8 MG/DL (ref 0–100)
LYMPHOCYTES # BLD: 1.2 K/UL (ref 0.8–3.5)
LYMPHOCYTES NFR BLD: 20 % (ref 12–49)
MCH RBC QN AUTO: 29.5 PG (ref 26–34)
MCHC RBC AUTO-ENTMCNC: 31.5 G/DL (ref 30–36.5)
MCV RBC AUTO: 93.5 FL (ref 80–99)
MONOCYTES # BLD: 0.5 K/UL (ref 0–1)
MONOCYTES NFR BLD: 8 % (ref 5–13)
NEUTS SEG # BLD: 4.1 K/UL (ref 1.8–8)
NEUTS SEG NFR BLD: 71 % (ref 32–75)
NRBC # BLD: 0 K/UL (ref 0–0.01)
NRBC BLD-RTO: 0 PER 100 WBC
PLATELET # BLD AUTO: 175 K/UL (ref 150–400)
PMV BLD AUTO: 11.4 FL (ref 8.9–12.9)
POTASSIUM SERPL-SCNC: 4.4 MMOL/L (ref 3.5–5.1)
PROT SERPL-MCNC: 7 G/DL (ref 6.4–8.2)
RBC # BLD AUTO: 4.34 M/UL (ref 3.8–5.2)
SODIUM SERPL-SCNC: 141 MMOL/L (ref 136–145)
TIBC SERPL-MCNC: 251 UG/DL (ref 250–450)
TRIGL SERPL-MCNC: 56 MG/DL (ref ?–150)
VLDLC SERPL CALC-MCNC: 11.2 MG/DL
WBC # BLD AUTO: 5.8 K/UL (ref 3.6–11)

## 2023-04-28 ENCOUNTER — OFFICE VISIT (OUTPATIENT)
Dept: FAMILY MEDICINE CLINIC | Age: 88
End: 2023-04-28
Payer: MEDICARE

## 2023-04-28 VITALS
TEMPERATURE: 97.3 F | BODY MASS INDEX: 27.97 KG/M2 | DIASTOLIC BLOOD PRESSURE: 68 MMHG | SYSTOLIC BLOOD PRESSURE: 134 MMHG | RESPIRATION RATE: 18 BRPM | OXYGEN SATURATION: 96 % | HEART RATE: 57 BPM | HEIGHT: 67 IN | WEIGHT: 178.2 LBS

## 2023-04-28 DIAGNOSIS — N18.30 STAGE 3 CHRONIC KIDNEY DISEASE, UNSPECIFIED WHETHER STAGE 3A OR 3B CKD (HCC): ICD-10-CM

## 2023-04-28 DIAGNOSIS — I10 PRIMARY HYPERTENSION: ICD-10-CM

## 2023-04-28 DIAGNOSIS — I87.2 VENOUS STASIS DERMATITIS OF RIGHT LOWER EXTREMITY: Primary | ICD-10-CM

## 2023-04-28 DIAGNOSIS — N18.32 STAGE 3B CHRONIC KIDNEY DISEASE (HCC): ICD-10-CM

## 2023-04-28 DIAGNOSIS — E78.00 PURE HYPERCHOLESTEROLEMIA: ICD-10-CM

## 2023-04-28 DIAGNOSIS — I27.20 PULMONARY HYPERTENSION (HCC): ICD-10-CM

## 2023-04-28 DIAGNOSIS — I63.319 CEREBROVASCULAR ACCIDENT (CVA) DUE TO THROMBOSIS OF MIDDLE CEREBRAL ARTERY, UNSPECIFIED BLOOD VESSEL LATERALITY (HCC): ICD-10-CM

## 2023-04-28 DIAGNOSIS — G45.9 TIA (TRANSIENT ISCHEMIC ATTACK): ICD-10-CM

## 2023-04-28 PROCEDURE — 1090F PRES/ABSN URINE INCON ASSESS: CPT | Performed by: FAMILY MEDICINE

## 2023-04-28 PROCEDURE — G8510 SCR DEP NEG, NO PLAN REQD: HCPCS | Performed by: FAMILY MEDICINE

## 2023-04-28 PROCEDURE — G8536 NO DOC ELDER MAL SCRN: HCPCS | Performed by: FAMILY MEDICINE

## 2023-04-28 PROCEDURE — 99214 OFFICE O/P EST MOD 30 MIN: CPT | Performed by: FAMILY MEDICINE

## 2023-04-28 PROCEDURE — G8427 DOCREV CUR MEDS BY ELIG CLIN: HCPCS | Performed by: FAMILY MEDICINE

## 2023-04-28 PROCEDURE — 1101F PT FALLS ASSESS-DOCD LE1/YR: CPT | Performed by: FAMILY MEDICINE

## 2023-04-28 PROCEDURE — G8417 CALC BMI ABV UP PARAM F/U: HCPCS | Performed by: FAMILY MEDICINE

## 2023-04-28 PROCEDURE — 1123F ACP DISCUSS/DSCN MKR DOCD: CPT | Performed by: FAMILY MEDICINE

## 2023-04-28 RX ORDER — MOMETASONE FUROATE 1 MG/G
CREAM TOPICAL
Qty: 45 G | Refills: 1 | Status: SHIPPED | OUTPATIENT
Start: 2023-04-28

## 2023-04-28 NOTE — PROGRESS NOTES
Lm Yan is a 80 y.o. female presenting for/with:    Chief Complaint   Patient presents with    Ankle swelling     Bump on right ankle/calf x 1 week, red/swelling    Foot Pain     Ball of right foot painful, callous        Visit Vitals  /68   Pulse (!) 57   Temp 97.3 °F (36.3 °C) (Temporal)   Resp 18   Ht 5' 7\" (1.702 m)   Wt 178 lb 3.2 oz (80.8 kg)   SpO2 96%   BMI 27.91 kg/m²     Pain Scale: /10  Pain Location:     1. \"Have you been to the ER, urgent care clinic since your last visit? Hospitalized since your last visit? \" No    2. \"Have you seen or consulted any other health care providers outside of the [address] [place] [address] since your last visit? \" No     3. For patients aged 39-70: Has the patient had a colonoscopy / FIT/ Cologuard? NA - based on age      If the patient is female:    4. For patients aged 41-77: Has the patient had a mammogram within the past 2 years? NA - based on age or sex      11. For patients aged 21-65: Has the patient had a pap smear? NA - based on age or sex      Symptom review:  Learning Assessment 6/15/2022   PRIMARY LEARNER Patient   PRIMARY LANGUAGE ENGLISH   LEARNER PREFERENCE PRIMARY DEMONSTRATION   ANSWERED BY patient   RELATIONSHIP SELF     Fall Risk Assessment, last 12 mths 4/28/2023   Able to walk? Yes   Fall in past 12 months? 0   Do you feel unsteady? 0   Are you worried about falling 0   Is TUG test greater than 12 seconds? -   Is the gait abnormal? -   Number of falls in past 12 months -   Fall with injury? -       3 most recent PHQ Screens 4/28/2023   PHQ Not Done -   Little interest or pleasure in doing things Not at all   Feeling down, depressed, irritable, or hopeless Not at all   Total Score PHQ 2 0     Abuse Screening Questionnaire 4/28/2023   Do you ever feel afraid of your partner? N   Are you in a relationship with someone who physically or mentally threatens you? N   Is it safe for you to go home?  Y       ADL Assessment 4/28/2023   Feeding yourself No Help Needed   Getting from bed to chair No Help Needed   Getting dressed No Help Needed   Bathing or showering No Help Needed   Walk across the room (includes cane/walker) No Help Needed   Using the telphone No Help Needed   Taking your medications No Help Needed   Preparing meals No Help Needed   Managing money (expenses/bills) No Help Needed   Moderately strenuous housework (laundry) No Help Needed   Shopping for personal items (toiletries/medicines) No Help Needed   Shopping for groceries No Help Needed   Driving No Help Needed   Climbing a flight of stairs No Help Needed   Getting to places beyond walking distances No Help Needed      Advance Care Planning 4/28/2023   Patient's Healthcare Decision Maker is: Named in scanned ACP document   Primary Decision Maker Name -   Primary Decision Maker Phone Number -   Primary Decision Maker Relationship to Patient -   Secondary Decision [address] Name -   Secondary Decision [address] Phone Number -   Secondary Decision Maker Relationship to Patient -   Confirm Advance Directive Yes, on file   Does the patient have other document types -

## 2023-04-28 NOTE — PROGRESS NOTES
Sandie Kunz is a 80 y.o. female     Subjective: Ankle swelling (Bump on right ankle/calf x 1 week, red/swelling) and Foot Pain (Ball of right foot painful, callous )      HPI:  F/U TIA/CVA  Doing well on ASA 81mg every day, eliquis 2.5 BID. Remains on BP pills (norvasc 5mg/d) and statin lipitor to 10mg/d. No syncope, no falls. No orthostasis sx since last visit. No darkening of vision since last visit. CTA 10/2015 showed minimal atherosclerosis. No BRBPR since last visit. Lab Results   Component Value Date/Time    Hemoglobin (POC) 11.0 06/07/2021 11:25 AM    Hemoglobin (POC) 12.9 10/22/2015 04:59 PM    HGB 12.8 03/01/2023 10:53 AM     Hypertension. Blood pressures much closer to goal. Management at last visit at included adding aldactone 12.5mg/d. Remains on lasix 20mg, norvasc 5mg daily. Current regimen: calcium channel blocker, aldactone  loop diuretic. Symptoms include mild pedal edema. Patient denies chest pain, palpitations, headache. Last GFR at baseline. Lab review:   Lab Results   Component Value Date/Time    Sodium 141 03/01/2023 10:53 AM    Potassium 4.4 03/01/2023 10:53 AM    Chloride 106 03/01/2023 10:53 AM    CO2 29 03/01/2023 10:53 AM    Anion gap 6 03/01/2023 10:53 AM    Glucose 87 03/01/2023 10:53 AM    BUN 22 (H) 03/01/2023 10:53 AM    Creatinine 1.26 (H) 03/01/2023 10:53 AM    BUN/Creatinine ratio 17 03/01/2023 10:53 AM    GFR est AA 44 (L) 06/15/2022 02:16 PM    GFR est non-AA 36 (L) 06/15/2022 02:16 PM    eGFR 41 (L) 03/01/2023 10:53 AM    Calcium 9.5 03/01/2023 10:53 AM       Hyperlipidemia. On lipitor 10mg. Rishabh well. No myalgias, arthralgias, unusual weakness.   Lab Results   Component Value Date/Time    Cholesterol, total 124 03/01/2023 10:53 AM    HDL Cholesterol 85 03/01/2023 10:53 AM    LDL, calculated 27.8 03/01/2023 10:53 AM    VLDL, calculated 11.2 03/01/2023 10:53 AM    Triglyceride 56 03/01/2023 10:53 AM    CHOL/HDL Ratio 1.5 03/01/2023 10:53 AM     Lab Results   Component Value Date/Time    ALT (SGPT) 17 03/01/2023 10:53 AM    Alk. phosphatase 88 03/01/2023 10:53 AM    Bilirubin, total 0.5 03/01/2023 10:53 AM     Gout  Current control good. Current symptoms  none. Typical location left first MTP joint  Last flareup: summer 2021  Number of flareups in past year:0  Current treatment: diet  Lab Results   Component Value Date/Time    Uric acid 5.6 06/15/2022 02:16 PM       OAB  Saw GYN Dr. Rebecca Mcmahan at [facility] in [place]. Doing well on oxybutynin XL 15 mg daily, judd well. PMH, SH, Medications/Allergies: reviewed, on chart. ROS:  Constitutional: No fever, chills or abnormal weight loss  Respiratory: No cough, SOB   CV: No chest pain or Palpitations    Visit Vitals  /68   Pulse (!) 57   Temp 97.3 °F (36.3 °C) (Temporal)   Resp 18   Ht 5' 7\" (1.702 m)   Wt 178 lb 3.2 oz (80.8 kg)   SpO2 96%   BMI 27.91 kg/m²     +3#  Wt Readings from Last 3 Encounters:   04/28/23 178 lb 3.2 oz (80.8 kg)   03/01/23 175 lb 9.6 oz (79.7 kg)   11/30/22 182 lb (82.6 kg)     BP Readings from Last 3 Encounters:   04/28/23 134/68   03/01/23 (!) 140/80   11/30/22 (!) 152/68     Physical Examination: General appearance - alert, well appearing, and in no distress  Mental status - alert, oriented to person, place, and time  Eyes - pupils equal and reactive, extraocular eye movements intact. ENT - bilateral external ears and nose normal. Normal lips  Extremities - peripheral pulses normal, trace pedal edema, no clubbing or cyanosis. R shin with sl erythematous ulcer, mod TTP, and mildly tender distended veins surrounding, with glossy skin texture near the sore. L foot with pre-ulcerative callus to the 1st MTP with some separation from the dermis visible. A/P:  Stasis dermatitis with ulcer R shin  Skin care ed. Tx ulcer with elocon cr 0.1% AAA BID prn, and knee high support stockings bilat. Pre-ulcerative callus L foot  Skin care ed. Monitor.  Consider podiatry consult. TIA/HLD/Carotid stenosis/anticoagulation  Doing better lately. Con't eliquis, statin, ASA,and work on BP. Plan to con't ASA 81mg and eliquis together indefinitely, provided pt has no bleeding problems. HTN and CKD  Pressure much better. Con't current tx. Stay hydrated, miguel in hot weather. Hyperglycemia  Doing ok lately. Last A1c ok. Will con't to monitor periodically, need to keep wt down. Plan Recheck fall 2023. Tx PRN. Lab Results   Component Value Date/Time    Hemoglobin A1c 6.1 (H) 02/02/2022 10:26 AM     GERD and GI bleed prophy  con't prilosec, is on double anticoag indefinitely. CBC, iron ok 3/2023    Pulmonary hypertension  Asyx. Monitor. Overweight  Stable to a little worse. Con't to work on diet. Flu shot UTD fall 2022.     F/U 4mo for recheck/PRN

## 2023-05-05 ENCOUNTER — TELEPHONE (OUTPATIENT)
Dept: FAMILY MEDICINE CLINIC | Age: 88
End: 2023-05-05

## 2023-05-10 ENCOUNTER — HOSPITAL ENCOUNTER (EMERGENCY)
Facility: HOSPITAL | Age: 88
Discharge: HOME | End: 2023-05-10
Attending: EMERGENCY MEDICINE
Payer: MEDICARE

## 2023-05-10 ENCOUNTER — APPOINTMENT (OUTPATIENT)
Facility: HOSPITAL | Age: 88
End: 2023-05-10
Payer: MEDICARE

## 2023-05-10 VITALS
OXYGEN SATURATION: 97 % | RESPIRATION RATE: 18 BRPM | DIASTOLIC BLOOD PRESSURE: 79 MMHG | HEIGHT: 67 IN | WEIGHT: 179 LBS | BODY MASS INDEX: 28.09 KG/M2 | TEMPERATURE: 97.7 F | SYSTOLIC BLOOD PRESSURE: 117 MMHG | HEART RATE: 67 BPM

## 2023-05-10 DIAGNOSIS — L03.90 CELLULITIS, UNSPECIFIED CELLULITIS SITE: Primary | ICD-10-CM

## 2023-05-10 DIAGNOSIS — I87.8 VENOUS STASIS: ICD-10-CM

## 2023-05-10 LAB — ECHO BSA: 1.96 M2

## 2023-05-10 PROCEDURE — 99284 EMERGENCY DEPT VISIT MOD MDM: CPT

## 2023-05-10 PROCEDURE — 93971 EXTREMITY STUDY: CPT

## 2023-05-10 RX ORDER — MOMETASONE FUROATE 1 MG/G
CREAM TOPICAL DAILY
COMMUNITY

## 2023-05-10 RX ORDER — CEPHALEXIN 250 MG/1
250 CAPSULE ORAL 3 TIMES DAILY
Qty: 15 CAPSULE | Refills: 0 | Status: SHIPPED | OUTPATIENT
Start: 2023-05-10 | End: 2023-05-15

## 2023-05-10 ASSESSMENT — PAIN SCALES - GENERAL
PAINLEVEL_OUTOF10: 1
PAINLEVEL_OUTOF10: 5

## 2023-05-10 ASSESSMENT — PAIN DESCRIPTION - LOCATION
LOCATION: LEG
LOCATION: LEG

## 2023-05-10 ASSESSMENT — LIFESTYLE VARIABLES
HOW OFTEN DO YOU HAVE A DRINK CONTAINING ALCOHOL: NEVER
HOW MANY STANDARD DRINKS CONTAINING ALCOHOL DO YOU HAVE ON A TYPICAL DAY: PATIENT DOES NOT DRINK

## 2023-05-10 ASSESSMENT — PAIN DESCRIPTION - DESCRIPTORS: DESCRIPTORS: ACHING

## 2023-05-10 ASSESSMENT — PAIN - FUNCTIONAL ASSESSMENT: PAIN_FUNCTIONAL_ASSESSMENT: 0-10

## 2023-05-10 ASSESSMENT — PAIN DESCRIPTION - ORIENTATION: ORIENTATION: RIGHT;LOWER

## 2023-05-10 NOTE — ED TRIAGE NOTES
Pt arrived by POV for leg pain. Pt reports right lower leg pain and a sore that is being treated with Mometasone Furoate. Pt started with leg pain on Sunday and has noted increased redness and soreness around a small wound on her right lower leg.   Pt is awake alert and oriented X 4, pt and family educated on ER flow

## 2023-05-10 NOTE — ED NOTES
Paged ultrasound, just saw order will be down to see patient      Christy Alcaraz, SADIA  05/10/23 [id number]

## 2023-05-10 NOTE — ED PROVIDER NOTES
EMERGENCY DEPARTMENT HISTORY AND PHYSICAL EXAM      Date: 5/10/2023  Patient Name: Santos Goodell    History of Presenting Illness     Chief Complaint   Patient presents with    Leg Pain     Right lower       History Provided By: Patient    HPI: Santos Goodell, 80 y.o. female with PMHx as noted below presents the emergency department chief complaint of right lower leg pain. Patient states she has had a small sore on her right lower leg now for the last 4 weeks that has been causing her discomfort. Patient states she has been seen by her primary care physician who prescribed a Methasone cream but notes no improvement so wanted to come to the ED today for additional evaluation. Pain is mild, worse with palpation. Denies any fevers, chills or systemic symptoms. The pain is not exertional.  Denies any trauma. Pt denies any other alleviating or exacerbating factors. Additionally, pt specifically denies any recent fever, chills, headache, nausea, vomiting, abdominal pain, CP, SOB, lightheadedness, dizziness, numbness, weakness, BLE swelling, heart palpitations, urinary sxs, diarrhea, constipation, melena, hematochezia, cough, or congestion. PCP: Tracy Colon MD    No current facility-administered medications for this encounter. Current Outpatient Medications   Medication Sig Dispense Refill    mometasone (ELOCON) 0.1 % cream Apply topically daily Apply topically daily.       cephALEXin (KEFLEX) 250 MG capsule Take 1 capsule by mouth 3 times daily for 5 days 15 capsule 0    amLODIPine (NORVASC) 5 MG tablet Take 1 tablet by mouth once daily for blood pressure      apixaban (ELIQUIS) 2.5 MG TABS tablet Take 2.5 mg by mouth 2 times daily      aspirin 81 MG EC tablet Take 81 mg by mouth daily      atorvastatin (LIPITOR) 10 MG tablet Take 1 tablet by mouth once daily      vitamin D 25 MCG (1000 UT) CAPS Take 1,000 Units by mouth daily      furosemide (LASIX) 20 MG tablet Take 20 mg by mouth

## 2023-05-14 RX ORDER — MOMETASONE FUROATE 1 MG/G
CREAM TOPICAL
Status: ON HOLD | COMMUNITY
Start: 2023-04-28 | End: 2023-05-25 | Stop reason: HOSPADM

## 2023-05-24 ENCOUNTER — HOSPITAL ENCOUNTER (OUTPATIENT)
Facility: HOSPITAL | Age: 88
Setting detail: OBSERVATION
Discharge: HOME | End: 2023-05-25
Attending: EMERGENCY MEDICINE | Admitting: INTERNAL MEDICINE
Payer: MEDICARE

## 2023-05-24 ENCOUNTER — APPOINTMENT (OUTPATIENT)
Facility: HOSPITAL | Age: 88
End: 2023-05-24
Payer: MEDICARE

## 2023-05-24 DIAGNOSIS — G45.9 TIA (TRANSIENT ISCHEMIC ATTACK): Primary | ICD-10-CM

## 2023-05-24 DIAGNOSIS — Z86.73 HISTORY OF STROKE: ICD-10-CM

## 2023-05-24 PROBLEM — N18.30 CKD (CHRONIC KIDNEY DISEASE) STAGE 3, GFR 30-59 ML/MIN (HCC): Status: ACTIVE | Noted: 2019-10-15

## 2023-05-24 PROBLEM — N32.81 OAB (OVERACTIVE BLADDER): Status: ACTIVE | Noted: 2018-12-26

## 2023-05-24 LAB
ALBUMIN SERPL-MCNC: 3.4 G/DL (ref 3.5–5)
ALBUMIN/GLOB SERPL: 0.9 (ref 1.1–2.2)
ALP SERPL-CCNC: 90 U/L (ref 45–117)
ALT SERPL-CCNC: 19 U/L (ref 12–78)
ANION GAP SERPL CALC-SCNC: 7 MMOL/L (ref 5–15)
APPEARANCE UR: CLEAR
AST SERPL-CCNC: 14 U/L (ref 15–37)
BACTERIA URNS QL MICRO: NEGATIVE /HPF
BASOPHILS # BLD: 0 K/UL (ref 0–0.1)
BASOPHILS NFR BLD: 0 % (ref 0–1)
BILIRUB SERPL-MCNC: 0.4 MG/DL (ref 0.2–1)
BILIRUB UR QL: NEGATIVE
BUN SERPL-MCNC: 24 MG/DL (ref 6–20)
BUN/CREAT SERPL: 18 (ref 12–20)
CALCIUM SERPL-MCNC: 9 MG/DL (ref 8.5–10.1)
CHLORIDE SERPL-SCNC: 105 MMOL/L (ref 97–108)
CO2 SERPL-SCNC: 29 MMOL/L (ref 21–32)
COLOR UR: ABNORMAL
CREAT SERPL-MCNC: 1.31 MG/DL (ref 0.55–1.02)
DIFFERENTIAL METHOD BLD: NORMAL
EOSINOPHIL # BLD: 0.1 K/UL (ref 0–0.4)
EOSINOPHIL NFR BLD: 2 % (ref 0–7)
EPITH CASTS URNS QL MICRO: ABNORMAL /LPF
ERYTHROCYTE [DISTWIDTH] IN BLOOD BY AUTOMATED COUNT: 12.9 % (ref 11.5–14.5)
GLOBULIN SER CALC-MCNC: 3.6 G/DL (ref 2–4)
GLUCOSE BLD STRIP.AUTO-MCNC: 95 MG/DL (ref 65–117)
GLUCOSE SERPL-MCNC: 109 MG/DL (ref 65–100)
GLUCOSE UR STRIP.AUTO-MCNC: NEGATIVE MG/DL
HCT VFR BLD AUTO: 40.4 % (ref 35–47)
HGB BLD-MCNC: 13.1 G/DL (ref 11.5–16)
HGB UR QL STRIP: ABNORMAL
IMM GRANULOCYTES # BLD AUTO: 0 K/UL (ref 0–0.04)
IMM GRANULOCYTES NFR BLD AUTO: 0 % (ref 0–0.5)
INR PPP: 1 (ref 0.9–1.1)
KETONES UR QL STRIP.AUTO: NEGATIVE MG/DL
LEUKOCYTE ESTERASE UR QL STRIP.AUTO: NEGATIVE
LYMPHOCYTES # BLD: 1.5 K/UL (ref 0.8–3.5)
LYMPHOCYTES NFR BLD: 21 % (ref 12–49)
MCH RBC QN AUTO: 29.8 PG (ref 26–34)
MCHC RBC AUTO-ENTMCNC: 32.4 G/DL (ref 30–36.5)
MCV RBC AUTO: 92 FL (ref 80–99)
MONOCYTES # BLD: 0.6 K/UL (ref 0–1)
MONOCYTES NFR BLD: 8 % (ref 5–13)
NEUTS SEG # BLD: 4.8 K/UL (ref 1.8–8)
NEUTS SEG NFR BLD: 69 % (ref 32–75)
NITRITE UR QL STRIP.AUTO: NEGATIVE
NRBC # BLD: 0 K/UL (ref 0–0.01)
NRBC BLD-RTO: 0 PER 100 WBC
PH UR STRIP: 7.5 (ref 5–8)
PLATELET # BLD AUTO: 158 K/UL (ref 150–400)
PMV BLD AUTO: 10.5 FL (ref 8.9–12.9)
POTASSIUM SERPL-SCNC: 4.4 MMOL/L (ref 3.5–5.1)
PROT SERPL-MCNC: 7 G/DL (ref 6.4–8.2)
PROT UR STRIP-MCNC: NEGATIVE MG/DL
PROTHROMBIN TIME: 10.5 SEC (ref 9–11.1)
RBC # BLD AUTO: 4.39 M/UL (ref 3.8–5.2)
RBC #/AREA URNS HPF: ABNORMAL /HPF (ref 0–5)
SERVICE CMNT-IMP: NORMAL
SODIUM SERPL-SCNC: 141 MMOL/L (ref 136–145)
SP GR UR REFRACTOMETRY: 1.02 (ref 1–1.03)
TROPONIN I SERPL HS-MCNC: 19 NG/L (ref 0–51)
URINE CULTURE IF INDICATED: ABNORMAL
UROBILINOGEN UR QL STRIP.AUTO: 0.2 EU/DL (ref 0.2–1)
WBC # BLD AUTO: 7 K/UL (ref 3.6–11)
WBC URNS QL MICRO: ABNORMAL /HPF (ref 0–4)

## 2023-05-24 PROCEDURE — 6370000000 HC RX 637 (ALT 250 FOR IP): Performed by: INTERNAL MEDICINE

## 2023-05-24 PROCEDURE — G0378 HOSPITAL OBSERVATION PER HR: HCPCS

## 2023-05-24 PROCEDURE — 93005 ELECTROCARDIOGRAM TRACING: CPT | Performed by: EMERGENCY MEDICINE

## 2023-05-24 PROCEDURE — 99285 EMERGENCY DEPT VISIT HI MDM: CPT

## 2023-05-24 PROCEDURE — 6360000004 HC RX CONTRAST MEDICATION: Performed by: EMERGENCY MEDICINE

## 2023-05-24 PROCEDURE — 81001 URINALYSIS AUTO W/SCOPE: CPT

## 2023-05-24 PROCEDURE — 82962 GLUCOSE BLOOD TEST: CPT

## 2023-05-24 PROCEDURE — 80053 COMPREHEN METABOLIC PANEL: CPT

## 2023-05-24 PROCEDURE — 85025 COMPLETE CBC W/AUTO DIFF WBC: CPT

## 2023-05-24 PROCEDURE — 85610 PROTHROMBIN TIME: CPT

## 2023-05-24 PROCEDURE — 36415 COLL VENOUS BLD VENIPUNCTURE: CPT

## 2023-05-24 PROCEDURE — 70551 MRI BRAIN STEM W/O DYE: CPT

## 2023-05-24 PROCEDURE — 84484 ASSAY OF TROPONIN QUANT: CPT

## 2023-05-24 PROCEDURE — 70450 CT HEAD/BRAIN W/O DYE: CPT

## 2023-05-24 PROCEDURE — 70496 CT ANGIOGRAPHY HEAD: CPT

## 2023-05-24 RX ORDER — ASPIRIN 81 MG/1
81 TABLET ORAL DAILY
Status: DISCONTINUED | OUTPATIENT
Start: 2023-05-24 | End: 2023-05-24

## 2023-05-24 RX ORDER — AMLODIPINE BESYLATE 5 MG/1
5 TABLET ORAL DAILY
Status: DISCONTINUED | OUTPATIENT
Start: 2023-05-24 | End: 2023-05-24

## 2023-05-24 RX ORDER — ENOXAPARIN SODIUM 100 MG/ML
40 INJECTION SUBCUTANEOUS DAILY
Status: DISCONTINUED | OUTPATIENT
Start: 2023-05-24 | End: 2023-05-24

## 2023-05-24 RX ORDER — SPIRONOLACTONE 25 MG/1
12.5 TABLET ORAL DAILY
Status: DISCONTINUED | OUTPATIENT
Start: 2023-05-25 | End: 2023-05-25 | Stop reason: HOSPADM

## 2023-05-24 RX ORDER — ASPIRIN 300 MG/1
300 SUPPOSITORY RECTAL DAILY
Status: DISCONTINUED | OUTPATIENT
Start: 2023-05-25 | End: 2023-05-25 | Stop reason: HOSPADM

## 2023-05-24 RX ORDER — FUROSEMIDE 20 MG/1
20 TABLET ORAL DAILY
Status: DISCONTINUED | OUTPATIENT
Start: 2023-05-25 | End: 2023-05-25 | Stop reason: HOSPADM

## 2023-05-24 RX ORDER — AMLODIPINE BESYLATE 5 MG/1
5 TABLET ORAL DAILY
Status: DISCONTINUED | OUTPATIENT
Start: 2023-05-25 | End: 2023-05-25 | Stop reason: HOSPADM

## 2023-05-24 RX ORDER — POLYETHYLENE GLYCOL 3350 17 G/17G
17 POWDER, FOR SOLUTION ORAL DAILY PRN
Status: DISCONTINUED | OUTPATIENT
Start: 2023-05-24 | End: 2023-05-25 | Stop reason: HOSPADM

## 2023-05-24 RX ORDER — OXYBUTYNIN CHLORIDE 5 MG/1
5 TABLET, EXTENDED RELEASE ORAL NIGHTLY
Status: DISCONTINUED | OUTPATIENT
Start: 2023-05-24 | End: 2023-05-25 | Stop reason: HOSPADM

## 2023-05-24 RX ORDER — ASPIRIN 81 MG/1
81 TABLET ORAL DAILY
Status: DISCONTINUED | OUTPATIENT
Start: 2023-05-25 | End: 2023-05-25 | Stop reason: HOSPADM

## 2023-05-24 RX ORDER — VITAMIN B COMPLEX
1000 TABLET ORAL DAILY
Status: DISCONTINUED | OUTPATIENT
Start: 2023-05-25 | End: 2023-05-25 | Stop reason: HOSPADM

## 2023-05-24 RX ORDER — ONDANSETRON 4 MG/1
4 TABLET, ORALLY DISINTEGRATING ORAL EVERY 8 HOURS PRN
Status: DISCONTINUED | OUTPATIENT
Start: 2023-05-24 | End: 2023-05-25 | Stop reason: HOSPADM

## 2023-05-24 RX ORDER — ATORVASTATIN CALCIUM 10 MG/1
10 TABLET, FILM COATED ORAL NIGHTLY
Status: DISCONTINUED | OUTPATIENT
Start: 2023-05-24 | End: 2023-05-25 | Stop reason: HOSPADM

## 2023-05-24 RX ORDER — SPIRONOLACTONE 25 MG/1
12.5 TABLET ORAL DAILY
Status: DISCONTINUED | OUTPATIENT
Start: 2023-05-24 | End: 2023-05-24

## 2023-05-24 RX ORDER — VITAMIN B COMPLEX
1000 TABLET ORAL DAILY
Status: DISCONTINUED | OUTPATIENT
Start: 2023-05-24 | End: 2023-05-24

## 2023-05-24 RX ORDER — ASPIRIN 300 MG/1
300 SUPPOSITORY RECTAL DAILY
Status: DISCONTINUED | OUTPATIENT
Start: 2023-05-24 | End: 2023-05-24

## 2023-05-24 RX ORDER — PANTOPRAZOLE SODIUM 40 MG/1
40 TABLET, DELAYED RELEASE ORAL
Status: DISCONTINUED | OUTPATIENT
Start: 2023-05-25 | End: 2023-05-25 | Stop reason: HOSPADM

## 2023-05-24 RX ORDER — ONDANSETRON 2 MG/ML
4 INJECTION INTRAMUSCULAR; INTRAVENOUS EVERY 6 HOURS PRN
Status: DISCONTINUED | OUTPATIENT
Start: 2023-05-24 | End: 2023-05-25 | Stop reason: HOSPADM

## 2023-05-24 RX ADMIN — APIXABAN 2.5 MG: 2.5 TABLET, FILM COATED ORAL at 20:53

## 2023-05-24 RX ADMIN — IOPAMIDOL 100 ML: 755 INJECTION, SOLUTION INTRAVENOUS at 12:54

## 2023-05-24 RX ADMIN — ATORVASTATIN CALCIUM 10 MG: 10 TABLET, FILM COATED ORAL at 20:52

## 2023-05-24 RX ADMIN — OXYBUTYNIN CHLORIDE 5 MG: 5 TABLET, EXTENDED RELEASE ORAL at 20:52

## 2023-05-24 ASSESSMENT — PATIENT HEALTH QUESTIONNAIRE - PHQ9
SUM OF ALL RESPONSES TO PHQ QUESTIONS 1-9: 0
2. FEELING DOWN, DEPRESSED OR HOPELESS: 0
SUM OF ALL RESPONSES TO PHQ QUESTIONS 1-9: 0
1. LITTLE INTEREST OR PLEASURE IN DOING THINGS: 0
SUM OF ALL RESPONSES TO PHQ QUESTIONS 1-9: 0
SUM OF ALL RESPONSES TO PHQ QUESTIONS 1-9: 0
SUM OF ALL RESPONSES TO PHQ9 QUESTIONS 1 & 2: 0

## 2023-05-24 ASSESSMENT — PAIN - FUNCTIONAL ASSESSMENT: PAIN_FUNCTIONAL_ASSESSMENT: NONE - DENIES PAIN

## 2023-05-24 ASSESSMENT — LIFESTYLE VARIABLES
HOW MANY STANDARD DRINKS CONTAINING ALCOHOL DO YOU HAVE ON A TYPICAL DAY: PATIENT DOES NOT DRINK
HOW OFTEN DO YOU HAVE A DRINK CONTAINING ALCOHOL: NEVER

## 2023-05-24 NOTE — ED NOTES
Pt transported to  with Alban Stark by Wheelchair at [phone].       Carlos Blakely RN  42/15/36 [phone]

## 2023-05-24 NOTE — ED NOTES
Pt transported to CT scan with [employer] by [employer] at now.       Sonia Monet RN  05/24/23 3294

## 2023-05-24 NOTE — ED NOTES
Angelo De La Garza Teleneurologist on remote console to assess pt. Recommendation for inpatient stay for further workup. NIHSS 0, normal visual valentin.      Miguel Garcia RN  95/48/48 [phone]

## 2023-05-24 NOTE — ED NOTES
Pt to go from Radiology to floor, this RN available to do NIHSS assessment with new RN when pt is available after imaging in room.      Leroy Fink RN  72/99/39 8243

## 2023-05-24 NOTE — ED NOTES
Neurologist Dr. Yasir Mason at bedside with remote telemetry robot to ask pt sister questions about event and observations. Pt is still in CT at this time.      Balbir Hastings RN  36/20/49 1566

## 2023-05-24 NOTE — ED TRIAGE NOTES
Pt presents with blurred vision, dizziness, and a \"funny\" feeling in her head. Pt states that these symptoms started at 10am. Pt lives at home alone.

## 2023-05-24 NOTE — H&P
the head and neck without significant abnormalities. Teleneurology was consulted and admission for \"TIA work-up\" was recommended. Past Medical History:  Past Medical History:   Diagnosis Date    Calculus of kidney     Carotid stenosis     CKD (chronic kidney disease)     GERD (gastroesophageal reflux disease)     Hiatal hernia     Hypertension     Stroke ([facility] [address] 10/6/12       Past Surgical History:  Past Surgical History:   Procedure Laterality Date    CATARACT REMOVAL Bilateral     CHOLECYSTECTOMY      HYSTERECTOMY (CERVIX STATUS UNKNOWN)         Medication:  Prior to Admission medications    Medication Sig Start Date End Date Taking? Authorizing Provider   mometasone (ELOCON) 0.1 % cream AAA to sore spots on legs or itchy patches BID as needed  Patient not taking: Reported on 5/24/2023 4/28/23   Ar Automatic Reconciliation   mometasone (ELOCON) 0.1 % cream Apply topically daily Apply topically daily.   Patient not taking: Reported on 5/24/2023    Historical Provider, MD   amLODIPine (NORVASC) 5 MG tablet Take 1 tablet by mouth once daily for blood pressure 10/1/22   Ar Automatic Reconciliation   apixaban (ELIQUIS) 2.5 MG TABS tablet Take 1 tablet by mouth 2 times daily 8/31/22   Ar Automatic Reconciliation   aspirin 81 MG EC tablet Take 1 tablet by mouth daily 6/15/22   Ar Automatic Reconciliation   atorvastatin (LIPITOR) 10 MG tablet Take 1 tablet by mouth once daily 11/27/22   Ar Automatic Reconciliation   vitamin D 25 MCG (1000 UT) CAPS Take 1 capsule by mouth daily    Ar Automatic Reconciliation   furosemide (LASIX) 20 MG tablet Take 20 mg by mouth  Patient not taking: Reported on 5/24/2023 6/16/22   Ar Automatic Reconciliation   omeprazole (PRILOSEC OTC) 20 MG tablet Take 1 tablet by mouth daily 8/31/22   Ar Automatic Reconciliation   oxybutynin (DITROPAN XL) 15 MG extended release tablet TAKE 1 TABLET BY MOUTH ONCE DAILY FOR  OVERACTIVE  BLADDER 4/25/22   Ar Automatic Reconciliation   spironolactone

## 2023-05-24 NOTE — ED NOTES
Received assignment, to assume care. Pt not currently symptomatic per charge RN outside a vague sensation of feeling 'funny' in her head. Earlier symptoms have resolved. Pt daughter at bedside. Pt currently in CT. Additional tests and assessments to be completed upon return.      Mónica Aparicio RN  72/77/74 3004

## 2023-05-24 NOTE — ED PROVIDER NOTES
encephalopathy. Based on the time course patient was made a level 2 stroke alert as she is not a TNK candidate due to her anticoagulation status. CT and CT head showed no acute findings, noted some chronic infarcts. Remainder of labs were reassuring and nondiagnostic. Consulted with teleneurology who evaluated the patient recommended work-up for possible TIA versus syncope. ED Course:   Initial assessment performed. The patients presenting problems have been discussed, and they are in agreement with the care plan formulated and outlined with them. I have encouraged them to ask questions as they arise throughout their visit.     Patient was given the following medications:  Medications   apixaban (ELIQUIS) tablet 2.5 mg (2.5 mg Oral Given 5/24/23 2053)   atorvastatin (LIPITOR) tablet 10 mg (10 mg Oral Given 5/24/23 2052)   furosemide (LASIX) tablet 20 mg (has no administration in time range)   pantoprazole (PROTONIX) tablet 40 mg (has no administration in time range)   oxybutynin (DITROPAN-XL) extended release tablet 5 mg (5 mg Oral Given 5/24/23 2052)   ondansetron (ZOFRAN-ODT) disintegrating tablet 4 mg (has no administration in time range)     Or   ondansetron (ZOFRAN) injection 4 mg (has no administration in time range)   polyethylene glycol (GLYCOLAX) packet 17 g (has no administration in time range)   amLODIPine (NORVASC) tablet 5 mg (has no administration in time range)   aspirin EC tablet 81 mg (has no administration in time range)     Or   aspirin suppository 300 mg (has no administration in time range)   spironolactone (ALDACTONE) tablet 12.5 mg (has no administration in time range)   Vitamin D (CHOLECALCIFEROL) tablet 1,000 Units (has no administration in time range)   iopamidol (ISOVUE-370) 76 % injection 100 mL (100 mLs IntraVENous Given 5/24/23 1254)              CONSULTS: (Who and What was discussed)  IP CONSULT TO SPIRITUAL SERVICES    PROGRESS:  The patient has been re-evaluated and sx have

## 2023-05-24 NOTE — ED NOTES
Admission SBAR Note  Situation/Background: pt reports to ED with dizziness/vision going dark bilaterally for a few minutes around 1000 that resolved. Pt still felt her head was 'funny' but this later resolved after her head CT at 1257. Pt unable to describe what 'funny' meant, denied dizziness, pain and NIHSS during this time was 0. NIHSS was 0 upon arrival.    Pt hx of previous CVA, TIAs    [facility]    Patient's Chief Complaint was TIA and is admitted for TIA. CODE STATUS: Full  CSSRS: 0 - No Risk    ISOLATION/PRECAUTIONS: No  ISOLATION TYPE: n/a    Is this a behavioral health patient? No  Has wanding been completed No  Are belongings secure? No    Called outstanding consults: Yes    STAT labs collected: Yes    Repeat Lactic Acid DUE? No  TIME DUE: n/a    All STAT orders are complete: Yes    The following personal items will be sent with the patient during transfer to the floor: All valuables: clothing shirt, pants, socks, footwear, WALKER,  with patient at bedside       ASSESSMENT:    NEURO:   NIH SCORE: 0,1-4,5-15,15-20,21-42: 0   ROVERTO SWALLOW SCREEN COMPLETE: Yes  ORIENTATION LEVEL: ORIENTATION LEVEL: Person, Place, Time, and Situation  Cognition:  appropriate decision making, appropriate for age attention/concentration, appropriate safety awareness, and following commands  follows multi-step complex commands/direction  Speech: shows no evidence of impairment    Is patient impulsive? No  Is patient oriented? Yes  Do they follow commands? Yes  Is the patient ambulatory? Yes with walker    FALL RISK? Yes  Interventions: Implemented/recommended use of non-skid footwear and Implemented/recommended use of fall risk identification flag to all team members    RESPIRATORY:   Is patient on oxygen? No  Oxygen therapy: room air  O2 rate: n/a    CARDIAC:   Is cardiac monitoring ordered?  Yes    Last

## 2023-05-24 NOTE — ED NOTES
Pt being assisted with to restroom by PCT, to stay in restroom with pt. Pt ambulating with personal walker.        Keira Bray RN  60/29/23 [phone]

## 2023-05-25 ENCOUNTER — APPOINTMENT (OUTPATIENT)
Facility: HOSPITAL | Age: 88
End: 2023-05-25
Attending: INTERNAL MEDICINE
Payer: MEDICARE

## 2023-05-25 VITALS
HEIGHT: 67 IN | RESPIRATION RATE: 16 BRPM | DIASTOLIC BLOOD PRESSURE: 62 MMHG | SYSTOLIC BLOOD PRESSURE: 140 MMHG | OXYGEN SATURATION: 95 % | HEART RATE: 62 BPM | TEMPERATURE: 98.4 F | WEIGHT: 171.8 LBS | BODY MASS INDEX: 26.97 KG/M2

## 2023-05-25 LAB
CHOLEST SERPL-MCNC: 105 MG/DL
ECHO AO ROOT DIAM: 3.3 CM
ECHO AO ROOT INDEX: 1.74 CM/M2
ECHO AR MAX VEL PISA: 3.9 M/S
ECHO AV PEAK GRADIENT: 8 MMHG
ECHO AV PEAK VELOCITY: 1.4 M/S
ECHO AV REGURGITANT PHT: 799.5 MILLISECOND
ECHO BSA: 1.92 M2
ECHO EST RA PRESSURE: 3 MMHG
ECHO LA DIAMETER INDEX: 2.53 CM/M2
ECHO LA DIAMETER: 4.8 CM
ECHO LA TO AORTIC ROOT RATIO: 1.45
ECHO LA VOL 2C: 53 ML (ref 22–52)
ECHO LA VOL 2C: 54 ML (ref 22–52)
ECHO LA VOL 4C: 55 ML (ref 22–52)
ECHO LA VOL 4C: 57 ML (ref 22–52)
ECHO LA VOLUME AREA LENGTH: 57 ML
ECHO LA VOLUME INDEX AREA LENGTH: 30 ML/M2 (ref 16–34)
ECHO LV E' LATERAL VELOCITY: 4 CM/S
ECHO LV E' SEPTAL VELOCITY: 4 CM/S
ECHO LV FRACTIONAL SHORTENING: 34 % (ref 28–44)
ECHO LV INTERNAL DIMENSION DIASTOLE INDEX: 2.32 CM/M2
ECHO LV INTERNAL DIMENSION DIASTOLIC: 4.4 CM (ref 3.9–5.3)
ECHO LV INTERNAL DIMENSION SYSTOLIC INDEX: 1.53 CM/M2
ECHO LV INTERNAL DIMENSION SYSTOLIC: 2.9 CM
ECHO LV IVSD: 1.3 CM (ref 0.6–0.9)
ECHO LV MASS 2D: 180 G (ref 67–162)
ECHO LV MASS INDEX 2D: 94.7 G/M2 (ref 43–95)
ECHO LV POSTERIOR WALL DIASTOLIC: 1 CM (ref 0.6–0.9)
ECHO LV RELATIVE WALL THICKNESS RATIO: 0.45
ECHO LVOT AREA: 3.1 CM2
ECHO LVOT DIAM: 2 CM
ECHO MV A VELOCITY: 0.91 M/S
ECHO MV E DECELERATION TIME (DT): 239.2 MS
ECHO MV E VELOCITY: 0.45 M/S
ECHO MV E/A RATIO: 0.49
ECHO MV E/E' LATERAL: 11.25
ECHO MV E/E' RATIO (AVERAGED): 11.25
ECHO MV E/E' SEPTAL: 11.25
ECHO PULMONARY ARTERY SYSTOLIC PRESSURE (PASP): 21 MMHG
ECHO RIGHT VENTRICULAR SYSTOLIC PRESSURE (RVSP): 21 MMHG
ECHO RV BASAL DIMENSION: 2.9 CM
ECHO TV REGURGITANT MAX VELOCITY: 2.13 M/S
ECHO TV REGURGITANT PEAK GRADIENT: 18 MMHG
ERYTHROCYTE [DISTWIDTH] IN BLOOD BY AUTOMATED COUNT: 13 % (ref 11.5–14.5)
EST. AVERAGE GLUCOSE BLD GHB EST-MCNC: 140 MG/DL
HBA1C MFR BLD: 6.5 % (ref 4–5.6)
HCT VFR BLD AUTO: 38.4 % (ref 35–47)
HDLC SERPL-MCNC: 75 MG/DL
HDLC SERPL: 1.4 (ref 0–5)
HGB BLD-MCNC: 12.4 G/DL (ref 11.5–16)
LDLC SERPL CALC-MCNC: 22.8 MG/DL (ref 0–100)
MCH RBC QN AUTO: 29.6 PG (ref 26–34)
MCHC RBC AUTO-ENTMCNC: 32.3 G/DL (ref 30–36.5)
MCV RBC AUTO: 91.6 FL (ref 80–99)
NRBC # BLD: 0 K/UL (ref 0–0.01)
NRBC BLD-RTO: 0 PER 100 WBC
PLATELET # BLD AUTO: 154 K/UL (ref 150–400)
PMV BLD AUTO: 10.9 FL (ref 8.9–12.9)
RBC # BLD AUTO: 4.19 M/UL (ref 3.8–5.2)
TRIGL SERPL-MCNC: 36 MG/DL
VLDLC SERPL CALC-MCNC: 7.2 MG/DL
WBC # BLD AUTO: 6.6 K/UL (ref 3.6–11)

## 2023-05-25 PROCEDURE — 97165 OT EVAL LOW COMPLEX 30 MIN: CPT

## 2023-05-25 PROCEDURE — 93306 TTE W/DOPPLER COMPLETE: CPT

## 2023-05-25 PROCEDURE — 97110 THERAPEUTIC EXERCISES: CPT

## 2023-05-25 PROCEDURE — 97161 PT EVAL LOW COMPLEX 20 MIN: CPT

## 2023-05-25 PROCEDURE — 85027 COMPLETE CBC AUTOMATED: CPT

## 2023-05-25 PROCEDURE — 80061 LIPID PANEL: CPT

## 2023-05-25 PROCEDURE — 83036 HEMOGLOBIN GLYCOSYLATED A1C: CPT

## 2023-05-25 PROCEDURE — G0378 HOSPITAL OBSERVATION PER HR: HCPCS

## 2023-05-25 PROCEDURE — 36415 COLL VENOUS BLD VENIPUNCTURE: CPT

## 2023-05-25 PROCEDURE — 97530 THERAPEUTIC ACTIVITIES: CPT

## 2023-05-25 PROCEDURE — 6370000000 HC RX 637 (ALT 250 FOR IP): Performed by: INTERNAL MEDICINE

## 2023-05-25 PROCEDURE — 94760 N-INVAS EAR/PLS OXIMETRY 1: CPT

## 2023-05-25 RX ADMIN — APIXABAN 2.5 MG: 2.5 TABLET, FILM COATED ORAL at 08:17

## 2023-05-25 RX ADMIN — SPIRONOLACTONE 12.5 MG: 25 TABLET ORAL at 08:17

## 2023-05-25 RX ADMIN — AMLODIPINE BESYLATE 5 MG: 5 TABLET ORAL at 08:17

## 2023-05-25 RX ADMIN — Medication 1000 UNITS: at 08:17

## 2023-05-25 RX ADMIN — FUROSEMIDE 20 MG: 20 TABLET ORAL at 08:17

## 2023-05-25 RX ADMIN — PANTOPRAZOLE SODIUM 40 MG: 40 TABLET, DELAYED RELEASE ORAL at 06:47

## 2023-05-25 RX ADMIN — ASPIRIN 81 MG: 81 TABLET, COATED ORAL at 08:16

## 2023-05-25 NOTE — CARE COORDINATION
Transition of Care Plan:    RUR: N/A OBSERVATION  Prior Level of Functioning: Independent  Disposition: Home with Home Health  If SNF or IPR: Date FOC offered: N/A  Date FOC received:   Accepting facility:   Date authorization started with reference number:   Date authorization received and expires: Follow up appointments:   DME needed: NO  Transportation at discharge: Family/POV  IM/IMM Medicare/ letter given: N/A OBSERVATION  Is patient a  and connected with VA? N/A   If yes, was [facility] transfer form completed and VA notified? Caregiver Contact:   Discharge Caregiver contacted prior to discharge? Care Conference needed? NO  Barriers to discharge: [address] referral requested. Family requested [facility]. I contacted Letty Siegel, [phone] and advised her of the referral. Information faxed to her to include face sheet, order, H&P. PT, OT notes and discharge summary to be faxed when available. Fax number is [phone]. The Freedom of [employer] form was received: copy to patient/family and copy to chart.

## 2023-05-25 NOTE — CARE COORDINATION
RUR: N/A OBSERVATION  Prior Level of Functioning: Independent  Disposition: Home with Home Health  If SNF or IPR: Date FOC offered: N/A  Date FOC received:   Accepting facility:   Date authorization started with reference number:   Date authorization received and expires: Follow up appointments:   DME needed: NO  Transportation at discharge: Family/POV  IM/IMM Medicare/ letter given: N/A OBSERVATION  Is patient a [place] and connected with VA? N/A              If yes, was [place] transfer form completed and VA notified? Caregiver Contact:   Discharge Caregiver contacted prior to discharge? Care Conference needed? NO  Barriers to discharge: N/A    PT, OT notes and med list faxed to Obdulio Restrepo at [facility], [phone]. Company will need the discharge summary when patient is discharged.

## 2023-05-25 NOTE — PLAN OF CARE
Problem: Physical Therapy - Adult  Goal: By Discharge: Performs mobility at highest level of function for planned discharge setting. See evaluation for individualized goals. Outcome: Adequate for Discharge   PHYSICAL THERAPY EVALUATION/DISCHARGE    Patient: Tomi Horne [de-identified]80 y.o. female)  Date: 5/25/2023  Primary Diagnosis: TIA (transient ischemic attack) [G45.9]       Precautions: General Precautions                    ASSESSMENT AND RECOMMENDATIONS:  Based on the objective data below, the patient presented resting in bed w/ 2 dtrs present in the room. Pt demonstrated AROM and strength of B LE that appeared grossly WNL as well as functional mobility and balance, activity tolerance and gait mechanics w/ AD that all appear at or near her functional baseline. She was able to transfer in/out of bed then walk all the way around the nursing station w/ 2WW before returning to the bed w/o assistance; no LOB, no SOB. Pt does not currently warrant any additional skilled PT services and will be d/c'd back to the care of the medical staff on the floor; MD aware. Pt may d/c home today w/ [facility] services recommended. Functional Outcome Measure: The patient scored 85/100 on the Barthel Index outcome measure which is indicative of being independent in basic self care; should be able to live independently. Further skilled acute physical therapy is not indicated at this time. PLAN :  Recommendation for discharge: (in order for the patient to meet his/her long term goals): Therapy 2 days/week in the home and assist for safety    Other factors to consider for discharge: lives alone    IF patient discharges home will need the following DME: patient owns DME required for discharge       SUBJECTIVE:   Patient stated I'm doing pretty well. .. hoping to go home today.     OBJECTIVE DATA SUMMARY:     Past Medical History:   Diagnosis Date    Calculus of kidney     Carotid stenosis     CKD (chronic kidney disease)     GERD

## 2023-05-25 NOTE — PROGRESS NOTES
Discharge Summary    Primo Price  :  1933  MRN:  [id number]    ADMIT DATE:  2023  DISCHARGE DATE:  2023      Discharge instruction reviewed with Patient and daughters    Home med's returned Yes    Personal belongings returned Yes    Patient Wheeled to front entrance via wheekchair with Nurse        SIGNED:    Tucker Zamarripa RN

## 2023-05-25 NOTE — PLAN OF CARE
OCCUPATIONAL THERAPY EVALUATION/DISCHARGE  Patient: Tomi Horne [de-identified]80 y.o. female)  Date: 5/25/2023  Primary Diagnosis: TIA (transient ischemic attack) [G45.9]         Precautions: Up as Tolerated                  ASSESSMENT :  Based on the objective data below, the patient has not shown a decline in her self care however she does perform all of her IADLs. Recommending [facility] to follow up at home. Functional Outcome Measure: The patient scored 65/100  on the Barthel index score  outcome measure which is indicative of requiring assistance however, sirs and ambulation has not been addressed. Further skilled acute occupational therapy is not indicated at this time. Recommending however, [facility] to follow up at home. PLAN :  Recommend with staff:encourage patient to perform her self care with only set up needed    Recommendation for discharge: (in order for the patient to meet his/her long term goals): Therapy 2 days/week in the home    Other factors to consider for discharge: no additional factors    IF patient discharges home will need the following DME: patient owns DME required for discharge       SUBJECTIVE:   Patient stated, I usually cook my own food. I do everything.     OBJECTIVE DATA SUMMARY:     Past Medical History:   Diagnosis Date    Calculus of kidney     Carotid stenosis     CKD (chronic kidney disease)     GERD (gastroesophageal reflux disease)     Hiatal hernia     Hypertension     Stroke ([facility] [address] 10/6/12     Past Surgical History:   Procedure Laterality Date    CATARACT REMOVAL Bilateral     CHOLECYSTECTOMY      HYSTERECTOMY (CERVIX STATUS UNKNOWN)         Prior Level of Function/Environment/Context: daughters assist with shopping by bringing what she needs on a monthly basis.  Her brother also who lives closeby can shop for her as needed  , ADL Assistance: Independent,  ,  ,  ,  ,  , Homemaking Assistance: Independent, Ambulation Assistance: Independent, Transfer Assistance: Independent, Active

## 2023-05-25 NOTE — PLAN OF CARE
Problem: ABCDS Injury Assessment  Goal: Absence of physical injury  Outcome: Progressing     Problem: Safety - Adult  Goal: Free from fall injury  Outcome: Progressing     Problem: Neurosensory - Adult  Goal: Achieves stable or improved neurological status  Outcome: Progressing     Problem: Cardiovascular - Adult  Goal: Maintains optimal cardiac output and hemodynamic stability  Outcome: Progressing  Flowsheets (Taken 5/24/2023 2100 by Juventino Duran RN)  Maintains optimal cardiac output and hemodynamic stability:   Monitor blood pressure and heart rate   Monitor urine output and notify Licensed Independent Practitioner for values outside of normal range   Assess for signs of decreased cardiac output  Goal: Absence of cardiac dysrhythmias or at baseline  Outcome: Progressing  Flowsheets (Taken 5/24/2023 2100 by Juventino Duran RN)  Absence of cardiac dysrhythmias or at baseline:   Monitor cardiac rate and rhythm   Assess for signs of decreased cardiac output     Problem: Musculoskeletal - Adult  Goal: Return ADL status to a safe level of function  Outcome: Progressing     Problem: Chronic Conditions and Co-morbidities  Goal: Patient's chronic conditions and co-morbidity symptoms are monitored and maintained or improved  Outcome: Progressing

## 2023-05-25 NOTE — CARE COORDINATION
05/25/23 1045   Service Assessment   Patient Orientation Alert and Oriented   Cognition Alert   History Provided By Patient   Primary Caregiver Self   Support Systems Children;Family Members;[employer] [address] is: Named in [address]   PCP Verified by CM Yes  (Dr. Erica Cheney MD, [facility])   Last Visit to PCP Within last 3 months   Prior Functional Level Independent in ADLs/IADLs   Current Functional Level Independent in ADLs/IADLs   Can patient return to prior living arrangement Yes   Family able to assist with home care needs: Yes   Would you like for me to discuss the discharge plan with any other family members/significant others, and if so, who? Yes  (Daughters Gabriel, Parker Hamilton, other relatives Hollie Jaime, Quita Ormond)   Financial Resources Medicare  (Medicare Supplement)   Social/Functional History   Lives With Alone   Type of [facility] [address], standard   Active  Yes   Discharge Planning   Type of Residence House   Living Arrangements Alone   Current Services Prior To Admission [facility])     Ms. Jazmin Barrientos was admitted under Observation Status for TIA eval.  She experienced blurred vision, dizziness, \"funny feeling in head;\" she has a history of stroke. She is feeling better now, no evidence of intracranial abnormality with diagnostic tests. Family was with patient during CM intake. Ms. Jazmin Barrientos lives alone and is able to manage her ADL's, uses a cane. She continues to drive short distances. There is a chance she will be able to be discharged to home today; family will be able to transport her at discharge. Home health is wanted; brochures for agencies serving the area provided per [place] [employer]. The CASTAÑEDA was explained/signed/received: copy to patient, copy to chart. The CM introductory letter with contact information was given.      [facility]

## 2023-05-25 NOTE — PROGRESS NOTES
Pharmacy Tech Clarification of Prior to Admission Medication Regimen     The patient was interviewed regarding clarification of the prior to admission medication regimen. Information Obtained From: Patient    Allergies updated/ Reaction: Ciprofloxacin    Organizer (pill box, bottles, etc): Pill Box    Additions: None    Medications that need DELETION: Momestasone cream and Furosemide    Changes: None    Pertinent Pharmacy Findings:  Updated patients preferred outpatient pharmacy to: [facility] in [place], [place]  Identified High Alert Medication Information  Current Anticoagulants: (Warfarin, Xarelto, Eliquis, Pradaxa)  Name: Eliquis 2.5mg      Patient was questioned regarding use of any other inhalers, topical products, over the counter medications, herbal medications, vitamin products or ophthalmic/nasal/otic medication use. Patient was inquired about side effects and was asked about pharmacist consultation if needed or desired (Y/N): Yes      PTA medication list was corrected to the following:     Prior to Admission Medications   Prescriptions Last Dose Informant Patient Reported? Taking?    amLODIPine (NORVASC) 5 MG tablet 5/24/2023  Yes No   Sig: Take 1 tablet by mouth once daily for blood pressure   apixaban (ELIQUIS) 2.5 MG TABS tablet 5/24/2023  Yes No   Sig: Take 1 tablet by mouth 2 times daily   aspirin 81 MG EC tablet 5/24/2023  Yes No   Sig: Take 1 tablet by mouth daily   atorvastatin (LIPITOR) 10 MG tablet 5/23/2023  Yes No   Sig: Take 1 tablet by mouth once daily   omeprazole (PRILOSEC OTC) 20 MG tablet 5/24/2023  Yes No   Sig: Take 1 tablet by mouth daily   oxybutynin (DITROPAN XL) 15 MG extended release tablet 5/24/2023  Yes No   Sig: TAKE 1 TABLET BY MOUTH ONCE DAILY FOR  OVERACTIVE  BLADDER   spironolactone (ALDACTONE) 25 MG tablet 5/24/2023  Yes No   Sig: Take 0.5 tablets by mouth daily   vitamin D 25 MCG (1000 UT) CAPS 5/24/2023  Yes No   Sig: Take 1 capsule by mouth daily

## 2023-05-25 NOTE — DISCHARGE SUMMARY
similar to prior study and consistent with remote  infarcts. There is no evidence of acute infarct, hemorrhage, or extraaxial fluid  collection. The ventricles are normal in size and position. Basilar cisterns  are patent. No midline shift. Redemonstration of empty sella, of indeterminate  clinical significance. The paranasal sinuses, mastoid air cells, and middle ears are clear. Status post  bilateral lense replacement. The orbits are otherwise unremarkable. There are no  significant osseous or extracranial soft tissue lesions. IMPRESSION: No evidence of acute intracranial abnormality. Patchy chronic right PICA/superior cerebellar artery territories infarcts. CTA HEAD / NECK 5/24:  No evidence for acute large vessel arterial occlusion. Mild to moderate  atherosclerosis. MRI BRAIN 5/24: The ventricles are normal in size and position. Unchanged scattered  periventricular and deep white matter T2/FLAIR hyperintensities, most prominent  in the periatrial regions, consistent with mild chronic microvascular ischemic  disease. Unchanged multiple small chronic infarcts in the bilateral frontal  lobes and centrum semiovale/corona radiata, and bilateral cerebellum, right  larger than left. There is no acute infarct, hemorrhage, extra-axial fluid  collection, or mass effect. There is no cerebellar tonsillar herniation. Expected arterial flow-voids are present. The paranasal sinuses, mastoid air cells, and middle ears are clear. The orbital  contents are within normal limits with bilateral lens implants. No significant  osseous or scalp lesions are identified. IMPRESSION:   1. No acute intracranial abnormality. 2. Unchanged mild chronic microvascular ischemic disease and multiple small  chronic infarcts as above, largest in the right cerebellum. ECHO 5/25:    Left Ventricle: Normal left ventricular systolic function with a visually estimated EF of 60 - 65%.  Left ventricle size is normal. Mild septal

## 2023-05-25 NOTE — DISCHARGE INSTRUCTIONS
Hospitalist Recommendations    Continue treatments as before this hospitalization  Take Aldactone only in needed for swelling  Take Ditropan only if benefits are appreciated  ECHO results are pending  ROOPA Ventura, Lima Zaman [place] [address]

## 2023-05-26 NOTE — CARE COORDINATION
0900: Dc summary faxed to [facility]. Spoke with Carlos who said they should be able to see patient on Sunday 5/28.       1050: Spoke with Carlos. She said what she has saw from PT note they can NOT take patient. Told Carlos that they said they accepted patient but now they are taking it back. [facility] and spoke with Maxx. Referral sent to [facility]. OT is delayed till 5/29 but other services are NOT delayed. Called patient and made her aware. 1330: Confirmed with Ivory from [facility] that they can see patient and they have everything they need. Yang Manley they will see her within 48 hours of discharge.

## 2023-05-27 LAB
EKG ATRIAL RATE: 64 BPM
EKG DIAGNOSIS: NORMAL
EKG P AXIS: 54 DEGREES
EKG P-R INTERVAL: 162 MS
EKG Q-T INTERVAL: 418 MS
EKG QRS DURATION: 128 MS
EKG QTC CALCULATION (BAZETT): 431 MS
EKG R AXIS: 57 DEGREES
EKG T AXIS: 211 DEGREES
EKG VENTRICULAR RATE: 64 BPM

## 2023-06-01 NOTE — ED NOTES
06/01/2023 1545:  accessed chart to help radiology with Stroke data     Miguelito Castano RN  06/01/23 [id number]

## 2023-06-05 ENCOUNTER — OFFICE VISIT (OUTPATIENT)
Age: 88
End: 2023-06-05
Payer: MEDICARE

## 2023-06-05 VITALS
WEIGHT: 175 LBS | DIASTOLIC BLOOD PRESSURE: 72 MMHG | RESPIRATION RATE: 18 BRPM | SYSTOLIC BLOOD PRESSURE: 150 MMHG | TEMPERATURE: 97.1 F | BODY MASS INDEX: 27.47 KG/M2 | OXYGEN SATURATION: 97 % | HEIGHT: 67 IN | HEART RATE: 65 BPM

## 2023-06-05 DIAGNOSIS — G45.9 TIA (TRANSIENT ISCHEMIC ATTACK): ICD-10-CM

## 2023-06-05 DIAGNOSIS — Z09 HOSPITAL DISCHARGE FOLLOW-UP: ICD-10-CM

## 2023-06-05 DIAGNOSIS — I49.9 IRREGULAR HEART RHYTHM: ICD-10-CM

## 2023-06-05 DIAGNOSIS — I35.8 AORTIC VALVE SCLEROSIS: ICD-10-CM

## 2023-06-05 DIAGNOSIS — R42 DIZZINESS AND GIDDINESS: Primary | ICD-10-CM

## 2023-06-05 DIAGNOSIS — I10 PRIMARY HYPERTENSION: ICD-10-CM

## 2023-06-05 DIAGNOSIS — I65.23 BILATERAL CAROTID ARTERY STENOSIS: ICD-10-CM

## 2023-06-05 DIAGNOSIS — G56.02 CARPAL TUNNEL SYNDROME ON LEFT: ICD-10-CM

## 2023-06-05 PROCEDURE — G8427 DOCREV CUR MEDS BY ELIG CLIN: HCPCS | Performed by: FAMILY MEDICINE

## 2023-06-05 PROCEDURE — 99215 OFFICE O/P EST HI 40 MIN: CPT | Performed by: FAMILY MEDICINE

## 2023-06-05 PROCEDURE — 1123F ACP DISCUSS/DSCN MKR DOCD: CPT | Performed by: FAMILY MEDICINE

## 2023-06-05 PROCEDURE — 1036F TOBACCO NON-USER: CPT | Performed by: FAMILY MEDICINE

## 2023-06-05 PROCEDURE — G8419 CALC BMI OUT NRM PARAM NOF/U: HCPCS | Performed by: FAMILY MEDICINE

## 2023-06-05 PROCEDURE — 1090F PRES/ABSN URINE INCON ASSESS: CPT | Performed by: FAMILY MEDICINE

## 2023-06-05 RX ORDER — LOSARTAN POTASSIUM 25 MG/1
12.5 TABLET ORAL DAILY
Qty: 30 TABLET | Refills: 5 | Status: SHIPPED | OUTPATIENT
Start: 2023-06-05

## 2023-06-05 SDOH — ECONOMIC STABILITY: FOOD INSECURITY: WITHIN THE PAST 12 MONTHS, THE FOOD YOU BOUGHT JUST DIDN'T LAST AND YOU DIDN'T HAVE MONEY TO GET MORE.: NEVER TRUE

## 2023-06-05 SDOH — ECONOMIC STABILITY: FOOD INSECURITY: WITHIN THE PAST 12 MONTHS, YOU WORRIED THAT YOUR FOOD WOULD RUN OUT BEFORE YOU GOT MONEY TO BUY MORE.: NEVER TRUE

## 2023-06-05 SDOH — ECONOMIC STABILITY: INCOME INSECURITY: HOW HARD IS IT FOR YOU TO PAY FOR THE VERY BASICS LIKE FOOD, HOUSING, MEDICAL CARE, AND HEATING?: NOT VERY HARD

## 2023-06-05 SDOH — ECONOMIC STABILITY: HOUSING INSECURITY
IN THE LAST 12 MONTHS, WAS THERE A TIME WHEN YOU DID NOT HAVE A STEADY PLACE TO SLEEP OR SLEPT IN A SHELTER (INCLUDING NOW)?: NO

## 2023-06-05 ASSESSMENT — PATIENT HEALTH QUESTIONNAIRE - PHQ9
SUM OF ALL RESPONSES TO PHQ9 QUESTIONS 1 & 2: 0
2. FEELING DOWN, DEPRESSED OR HOPELESS: 0
SUM OF ALL RESPONSES TO PHQ QUESTIONS 1-9: 0
SUM OF ALL RESPONSES TO PHQ QUESTIONS 1-9: 0
1. LITTLE INTEREST OR PLEASURE IN DOING THINGS: 0
SUM OF ALL RESPONSES TO PHQ QUESTIONS 1-9: 0
SUM OF ALL RESPONSES TO PHQ QUESTIONS 1-9: 0

## 2023-06-18 PROBLEM — I44.7 LBBB (LEFT BUNDLE BRANCH BLOCK): Status: ACTIVE | Noted: 2023-06-18

## 2023-06-20 NOTE — PROGRESS NOTES
from bed to chair No Help Needed   Getting dressed No Help Needed   Bathing or showering No Help Needed   Walk across the room (includes cane/walker) No Help Needed   Using the telphone No Help Needed   Taking your medications No Help Needed   Preparing meals No Help Needed   Managing money (expenses/bills) No Help Needed   Moderately strenuous housework (laundry) No Help Needed   Shopping for personal items (toiletries/medicines) No Help Needed   Shopping for groceries No Help Needed   Driving No Help Needed   Climbing a flight of stairs Help Needed   Getting to places beyond walking distances No Help Needed       AMB Abuse Screening 6/5/2023   Do you ever feel afraid of your partner? N   Are you in a relationship with someone who physically or mentally threatens you? N   Is it safe for you to go home?  Rashi Gonzalez 94     The patient has appointed the following active healthcare agents:    Primary Decision Maker: Joao Arellano - Child - [phone]    Primary Decision Maker: Elin Pretty - Child - [phone]    Primary Decision Maker: Joseph Modi - Child - [phone]    Secondary Decision Maker: Des Warren - Other - [phone]
stenosis/anticoagulation  Had reassuring lab workup, but telemetry review seems to show some tachycardia spells, rohan on 5/24/2023 at 1400 to 140:02. Doing better lately, but still having sx. Con't eliquis, statin, ASA,and work on BP. Plan to con't ASA 81mg and eliquis together indefinitely, provided pt has no bleeding problems. Set up for cardiac consult. Ask hospitalist for opinion re: telemetry tracing. HTN and CKD  Pressures up. Likely 2nd to holding aldactone. Some concern for volume depletion, so stay off that and try r/s low dose  losartan 12.5mg/d. GFR should be good enough and K+ ok. Stay hydrated, rohan in hot weather. Monitor, adj PRN. Hyperglycemia  Doing so/so lately. Last A1c a little up. May benefit from SGLT2, Plan Recheck late summer 2023. Tx PRN. Lab Results   Component Value Date    LABA1C 6.5 (H) 05/25/2023    LABA1C 6.1 (H) 02/02/2022    LABA1C 6.3 (H) 09/07/2021    GLUCOSE 109 (H) 05/24/2023    CREATININE 1.31 (H) 05/24/2023    LDLCALC 22.8 05/25/2023     GERD and GI bleed prophy  con't prilosec, is on double anticoag indefinitely. CBC, iron ok 5/2023, so anemia not cause of sx. Pulmonary hypertension  No apparent sx. Monitor. Will seek cardio input. Overweight  Stable. Con't to work on diet. CTS L hand  Mentioned incidentally. Mild. Use brace regularly. Djd knee R  Mild. Use OTC topicals PRN, and APAP arthritis 2 tabs TID PRN pain    Flu shot UTD fall 2022.     F/U 1-2w  for recheck/PRN    Time-based coding, delete if not needed: I spent at least 50 minutes with this established patient, and >50% of the time was spent counseling and/or coordinating care regarding care plan and expected course  Analia Wesley MD
1

## 2023-07-06 ENCOUNTER — OFFICE VISIT (OUTPATIENT)
Age: 88
End: 2023-07-06
Payer: MEDICARE

## 2023-07-06 VITALS
HEIGHT: 67 IN | TEMPERATURE: 97.5 F | OXYGEN SATURATION: 96 % | WEIGHT: 178 LBS | HEART RATE: 96 BPM | BODY MASS INDEX: 27.94 KG/M2 | RESPIRATION RATE: 18 BRPM | SYSTOLIC BLOOD PRESSURE: 130 MMHG | DIASTOLIC BLOOD PRESSURE: 70 MMHG

## 2023-07-06 DIAGNOSIS — E78.00 HYPERCHOLESTEROLEMIA: ICD-10-CM

## 2023-07-06 DIAGNOSIS — R42 DIZZINESS AND GIDDINESS: ICD-10-CM

## 2023-07-06 DIAGNOSIS — I44.7 LBBB (LEFT BUNDLE BRANCH BLOCK): ICD-10-CM

## 2023-07-06 DIAGNOSIS — I10 ESSENTIAL HYPERTENSION: ICD-10-CM

## 2023-07-06 DIAGNOSIS — I49.1 ATRIAL ECTOPY: Primary | ICD-10-CM

## 2023-07-06 PROCEDURE — 93000 ELECTROCARDIOGRAM COMPLETE: CPT | Performed by: INTERNAL MEDICINE

## 2023-07-06 PROCEDURE — 99204 OFFICE O/P NEW MOD 45 MIN: CPT | Performed by: INTERNAL MEDICINE

## 2023-07-06 PROCEDURE — 1123F ACP DISCUSS/DSCN MKR DOCD: CPT | Performed by: INTERNAL MEDICINE

## 2023-07-06 ASSESSMENT — PATIENT HEALTH QUESTIONNAIRE - PHQ9
SUM OF ALL RESPONSES TO PHQ QUESTIONS 1-9: 0
SUM OF ALL RESPONSES TO PHQ QUESTIONS 1-9: 0
1. LITTLE INTEREST OR PLEASURE IN DOING THINGS: 0
2. FEELING DOWN, DEPRESSED OR HOPELESS: 0
SUM OF ALL RESPONSES TO PHQ QUESTIONS 1-9: 0
SUM OF ALL RESPONSES TO PHQ QUESTIONS 1-9: 0
SUM OF ALL RESPONSES TO PHQ9 QUESTIONS 1 & 2: 0

## 2023-08-11 ENCOUNTER — OFFICE VISIT (OUTPATIENT)
Age: 88
End: 2023-08-11
Payer: MEDICARE

## 2023-08-11 VITALS
SYSTOLIC BLOOD PRESSURE: 170 MMHG | RESPIRATION RATE: 16 BRPM | WEIGHT: 179.2 LBS | BODY MASS INDEX: 28.12 KG/M2 | TEMPERATURE: 98.4 F | HEART RATE: 68 BPM | HEIGHT: 67 IN | OXYGEN SATURATION: 98 % | DIASTOLIC BLOOD PRESSURE: 76 MMHG

## 2023-08-11 DIAGNOSIS — B37.89 CANDIDIASIS OF BREAST: Primary | ICD-10-CM

## 2023-08-11 PROCEDURE — G8419 CALC BMI OUT NRM PARAM NOF/U: HCPCS | Performed by: INTERNAL MEDICINE

## 2023-08-11 PROCEDURE — G8427 DOCREV CUR MEDS BY ELIG CLIN: HCPCS | Performed by: INTERNAL MEDICINE

## 2023-08-11 PROCEDURE — 99213 OFFICE O/P EST LOW 20 MIN: CPT | Performed by: INTERNAL MEDICINE

## 2023-08-11 PROCEDURE — 1090F PRES/ABSN URINE INCON ASSESS: CPT | Performed by: INTERNAL MEDICINE

## 2023-08-11 PROCEDURE — 1123F ACP DISCUSS/DSCN MKR DOCD: CPT | Performed by: INTERNAL MEDICINE

## 2023-08-11 PROCEDURE — 1036F TOBACCO NON-USER: CPT | Performed by: INTERNAL MEDICINE

## 2023-08-11 RX ORDER — CLOTRIMAZOLE AND BETAMETHASONE DIPROPIONATE 10; .64 MG/G; MG/G
CREAM TOPICAL
Qty: 45 G | Refills: 3 | Status: SHIPPED | OUTPATIENT
Start: 2023-08-11

## 2023-08-11 RX ORDER — SPIRONOLACTONE 25 MG/1
TABLET ORAL
COMMUNITY
Start: 2023-06-10

## 2023-08-11 RX ORDER — OXYBUTYNIN CHLORIDE 10 MG/1
1 TABLET, EXTENDED RELEASE ORAL DAILY
COMMUNITY

## 2023-08-11 ASSESSMENT — PATIENT HEALTH QUESTIONNAIRE - PHQ9
SUM OF ALL RESPONSES TO PHQ QUESTIONS 1-9: 0
SUM OF ALL RESPONSES TO PHQ9 QUESTIONS 1 & 2: 0
2. FEELING DOWN, DEPRESSED OR HOPELESS: 0
1. LITTLE INTEREST OR PLEASURE IN DOING THINGS: 0
SUM OF ALL RESPONSES TO PHQ QUESTIONS 1-9: 0

## 2023-08-19 ENCOUNTER — HOSPITAL ENCOUNTER (EMERGENCY)
Facility: HOSPITAL | Age: 88
Discharge: HOME | End: 2023-08-19
Attending: EMERGENCY MEDICINE
Payer: MEDICARE

## 2023-08-19 ENCOUNTER — APPOINTMENT (OUTPATIENT)
Facility: HOSPITAL | Age: 88
End: 2023-08-19
Payer: MEDICARE

## 2023-08-19 VITALS
HEIGHT: 67 IN | WEIGHT: 175 LBS | HEART RATE: 56 BPM | OXYGEN SATURATION: 99 % | DIASTOLIC BLOOD PRESSURE: 67 MMHG | BODY MASS INDEX: 27.47 KG/M2 | RESPIRATION RATE: 18 BRPM | SYSTOLIC BLOOD PRESSURE: 151 MMHG | TEMPERATURE: 98 F

## 2023-08-19 DIAGNOSIS — M54.50 ACUTE MIDLINE LOW BACK PAIN WITHOUT SCIATICA: Primary | ICD-10-CM

## 2023-08-19 PROCEDURE — 99283 EMERGENCY DEPT VISIT LOW MDM: CPT

## 2023-08-19 PROCEDURE — 72100 X-RAY EXAM L-S SPINE 2/3 VWS: CPT

## 2023-08-19 RX ORDER — SENNOSIDES 8.6 MG
650 CAPSULE ORAL EVERY 8 HOURS PRN
Qty: 60 TABLET | Refills: 0 | Status: SHIPPED | OUTPATIENT
Start: 2023-08-19

## 2023-08-19 ASSESSMENT — ENCOUNTER SYMPTOMS
VOMITING: 0
COLOR CHANGE: 0
ABDOMINAL PAIN: 0
DIARRHEA: 0
COUGH: 0

## 2023-08-19 ASSESSMENT — PAIN DESCRIPTION - DESCRIPTORS: DESCRIPTORS: ACHING

## 2023-08-19 ASSESSMENT — PAIN - FUNCTIONAL ASSESSMENT: PAIN_FUNCTIONAL_ASSESSMENT: 0-10

## 2023-08-19 ASSESSMENT — PAIN SCALES - GENERAL: PAINLEVEL_OUTOF10: 8

## 2023-08-19 ASSESSMENT — PAIN DESCRIPTION - ORIENTATION: ORIENTATION: LOWER

## 2023-08-19 ASSESSMENT — PAIN DESCRIPTION - LOCATION: LOCATION: BACK

## 2023-08-19 NOTE — DISCHARGE INSTRUCTIONS
You were seen in the emergency room for back pain. Your x-ray does not show any evidence of fracture. Continue taking the Tylenol but I am sending you a prescription for arthritis formula that you can take 3 times a day as it will last 8 hours. If your symptoms do not improve, follow-up with Dr. Cary Paige so this week in the office.

## 2023-08-19 NOTE — ED TRIAGE NOTES
Pt arrived by POV for lower back pain. Pt reports her back started to hurt yesterday and it was much worse yesterday then today. Pt is awake alert and oriented X 4.   Pt educated on ER flow

## 2023-08-28 RX ORDER — OMEPRAZOLE 20 MG/1
20 TABLET, DELAYED RELEASE ORAL DAILY
Qty: 30 TABLET | Refills: 11 | Status: SHIPPED | OUTPATIENT
Start: 2023-08-28

## 2023-08-28 NOTE — TELEPHONE ENCOUNTER
Pt called wanting a refill on Eliquis and Omeprazole, pt stated she will be out of both medications tomorrow. Please advise.

## 2023-09-18 ENCOUNTER — OFFICE VISIT (OUTPATIENT)
Age: 88
End: 2023-09-18
Payer: MEDICARE

## 2023-09-18 VITALS
WEIGHT: 178.6 LBS | HEART RATE: 78 BPM | SYSTOLIC BLOOD PRESSURE: 122 MMHG | DIASTOLIC BLOOD PRESSURE: 68 MMHG | RESPIRATION RATE: 18 BRPM | OXYGEN SATURATION: 97 % | HEIGHT: 67 IN | BODY MASS INDEX: 28.03 KG/M2

## 2023-09-18 DIAGNOSIS — I27.20 PULMONARY HYPERTENSION (HCC): ICD-10-CM

## 2023-09-18 DIAGNOSIS — N18.32 STAGE 3B CHRONIC KIDNEY DISEASE (HCC): ICD-10-CM

## 2023-09-18 DIAGNOSIS — E78.2 MIXED HYPERLIPIDEMIA: ICD-10-CM

## 2023-09-18 DIAGNOSIS — Z00.00 MEDICARE ANNUAL WELLNESS VISIT, SUBSEQUENT: Primary | ICD-10-CM

## 2023-09-18 DIAGNOSIS — R73.9 HYPERGLYCEMIA: ICD-10-CM

## 2023-09-18 DIAGNOSIS — I73.9 PERIPHERAL VASCULAR DISEASE (HCC): ICD-10-CM

## 2023-09-18 DIAGNOSIS — Z23 NEEDS FLU SHOT: ICD-10-CM

## 2023-09-18 DIAGNOSIS — I10 PRIMARY HYPERTENSION: ICD-10-CM

## 2023-09-18 PROCEDURE — 99214 OFFICE O/P EST MOD 30 MIN: CPT | Performed by: FAMILY MEDICINE

## 2023-09-18 PROCEDURE — G0439 PPPS, SUBSEQ VISIT: HCPCS | Performed by: FAMILY MEDICINE

## 2023-09-18 PROCEDURE — 1123F ACP DISCUSS/DSCN MKR DOCD: CPT | Performed by: FAMILY MEDICINE

## 2023-09-18 PROCEDURE — 90694 VACC AIIV4 NO PRSRV 0.5ML IM: CPT | Performed by: FAMILY MEDICINE

## 2023-09-18 PROCEDURE — G0008 ADMIN INFLUENZA VIRUS VAC: HCPCS | Performed by: FAMILY MEDICINE

## 2023-09-18 RX ORDER — OMEPRAZOLE 20 MG/1
CAPSULE, DELAYED RELEASE ORAL
COMMUNITY
Start: 2023-08-29

## 2023-09-18 ASSESSMENT — PATIENT HEALTH QUESTIONNAIRE - PHQ9
SUM OF ALL RESPONSES TO PHQ QUESTIONS 1-9: 0
2. FEELING DOWN, DEPRESSED OR HOPELESS: 0
1. LITTLE INTEREST OR PLEASURE IN DOING THINGS: 0
SUM OF ALL RESPONSES TO PHQ QUESTIONS 1-9: 0
SUM OF ALL RESPONSES TO PHQ9 QUESTIONS 1 & 2: 0

## 2023-09-18 NOTE — PROGRESS NOTES
Jameel Dominguez is a 80 y.o. female  Chief Complaint   Patient presents with    Medicare AWV    Ear Fullness     C/O \"something in her (L) ear    Eye Drainage     Reports after her cataract removal her (R) eye has been oozing clear liquid    Hearing Problem     Requests referral for hearing test    Gout     Wants to discuss gout    Skin Problem     States used the cream on her chest but wants to mole to be looked at. Orders     Request order for W/C. Immunizations     Requests flu vaccine     HPI:  F/U TIA/CVA  Pt had another spell of dizziness on 5/24. [facility] to [facility] had eval, imaging, which was reassuring (just showed old ASCVD changes and old microvascular changes). Admitted, monitored. No remarkable findings noted on workup other than a mild bump in creatinine. HGB, sugars were good. Con't on ASA 81mg every day, eliquis 2.5 BID. Remains on BP pills (norvasc 5mg/d) and statin lipitor to 10mg/d. Has con't to have some dizzy feelings since d/c however, with assoc orthostasis, having to sit abruptly on 2 occasions since discharge, both times associated with a cold sweat spell lasting a minute or so, then passing. No BRBPR since last visit. Lab Results   Component Value Date    WBC 6.6 05/25/2023    HGB 12.4 05/25/2023    HCT 38.4 05/25/2023    MCV 91.6 05/25/2023     05/25/2023     Hypertension. Blood pressures a lot better lately. Management at our last visit at included con't aldactone 12.5mg/d, but hasn't been taking that since hosp stay due to confusion about diuretic use, thought told to stop that. Belen Norman notes are unclear- d/c med list shows to con't spironolactone and d/c lasix, but notes show to change spironolactone to PRN only. Unsure why. Will try to clarify with hospitalist. Remains on Norvasc 5mg daily. Current regimen: calcium channel blocker, but not on aldactone or furosemide. Symptoms include orthostasis, no significant pedal edema.  Patient denies chest pain, palpitations,
injury in past year? no           8/11/2023     3:00 PM 6/5/2023     3:00 PM   ADL ASSESSMENT   Feeding yourself No Help Needed No Help Needed   Getting from bed to chair No Help Needed No Help Needed   Getting dressed No Help Needed No Help Needed   Bathing or showering No Help Needed No Help Needed   Walk across the room (includes cane/walker) Help Needed No Help Needed   Using the telphone No Help Needed No Help Needed   Taking your medications No Help Needed No Help Needed   Preparing meals No Help Needed No Help Needed   Managing money (expenses/bills) No Help Needed No Help Needed   Moderately strenuous housework (laundry) Help Needed No Help Needed   Shopping for personal items (toiletries/medicines) No Help Needed No Help Needed   Shopping for groceries No Help Needed No Help Needed   Driving No Help Needed No Help Needed   Climbing a flight of stairs Help Needed Help Needed   Getting to places beyond walking distances Help Needed No Help Needed           8/11/2023     3:00 PM   AMB Abuse Screening   Do you ever feel afraid of your partner? N   Are you in a relationship with someone who physically or mentally threatens you? N   Is it safe for you to go home?  Y       Advance Care Planning     The patient has appointed the following active healthcare agents:    Primary Decision Maker: Tin Mccann - Child - [phone]    Primary Decision Maker: Vandana Johnson - Child - [phone]    Primary Decision Maker: Bethany Patterson - Child - [phone]    Secondary Decision Maker: Marian Hermosillo - [phone]    Secondary Decision Maker: Femi Lima - Other - [phone]

## 2023-09-18 NOTE — PATIENT INSTRUCTIONS
for Carmencita Ar - 9/18/2023  Medicare offers a range of preventive health benefits. Some of the tests and screenings are paid in full while other may be subject to a deductible, co-insurance, and/or copay. Some of these benefits include a comprehensive review of your medical history including lifestyle, illnesses that may run in your family, and various assessments and screenings as appropriate. After reviewing your medical record and screening and assessments performed today your provider may have ordered immunizations, labs, imaging, and/or referrals for you. A list of these orders (if applicable) as well as your Preventive Care list are included within your After Visit Summary for your review. Other Preventive Recommendations:    A preventive eye exam performed by an eye specialist is recommended every 1-2 years to screen for glaucoma; cataracts, macular degeneration, and other eye disorders. A preventive dental visit is recommended every 6 months. Try to get at least 150 minutes of exercise per week or 10,000 steps per day on a pedometer . Order or download the FREE \"Exercise & Physical Activity: Your Everyday Guide\" from [employer]. Call 3-819.427.5635 or search [employer] online. You need 8691-8672 mg of calcium and 4144-9176 IU of vitamin D per day. It is possible to meet your calcium requirement with diet alone, but a vitamin D supplement is usually necessary to meet this goal.  When exposed to the sun, use a sunscreen that protects against both UVA and UVB radiation with an SPF of 30 or greater. Reapply every 2 to 3 hours or after sweating, drying off with a towel, or swimming. Always wear a seat belt when traveling in a car. Always wear a helmet when riding a bicycle or motorcycle.

## 2023-09-19 LAB
BUN SERPL-MCNC: 21 MG/DL (ref 8–27)
BUN/CREAT SERPL: 23 (ref 12–28)
CALCIUM SERPL-MCNC: 9 MG/DL (ref 8.7–10.3)
CHLORIDE SERPL-SCNC: 106 MMOL/L (ref 96–106)
CO2 SERPL-SCNC: 25 MMOL/L (ref 20–29)
CREAT SERPL-MCNC: 0.92 MG/DL (ref 0.57–1)
EGFRCR SERPLBLD CKD-EPI 2021: 60 ML/MIN/1.73
GLUCOSE SERPL-MCNC: 141 MG/DL (ref 70–99)
HBA1C MFR BLD: 6.3 % (ref 4.8–5.6)
POTASSIUM SERPL-SCNC: 3.9 MMOL/L (ref 3.5–5.2)
SODIUM SERPL-SCNC: 143 MMOL/L (ref 134–144)

## 2023-09-27 RX ORDER — AMLODIPINE BESYLATE 5 MG/1
5 TABLET ORAL DAILY
Qty: 90 TABLET | Refills: 3 | Status: SHIPPED | OUTPATIENT
Start: 2023-09-27

## 2023-11-24 ENCOUNTER — OFFICE VISIT (OUTPATIENT)
Age: 88
End: 2023-11-24
Payer: MEDICARE

## 2023-11-24 VITALS — TEMPERATURE: 97.5 F | RESPIRATION RATE: 18 BRPM | OXYGEN SATURATION: 95 % | HEART RATE: 74 BPM

## 2023-11-24 DIAGNOSIS — R05.9 COUGH, UNSPECIFIED TYPE: Primary | ICD-10-CM

## 2023-11-24 DIAGNOSIS — U07.1 COVID-19: ICD-10-CM

## 2023-11-24 LAB
EXP DATE SOLUTION: ABNORMAL
EXP DATE SWAB: ABNORMAL
EXPIRATION DATE: ABNORMAL
LOT NUMBER POC: ABNORMAL
LOT NUMBER SOLUTION: ABNORMAL
LOT NUMBER SWAB: ABNORMAL
SARS-COV-2 RNA, POC: POSITIVE

## 2023-11-24 PROCEDURE — 1090F PRES/ABSN URINE INCON ASSESS: CPT | Performed by: NURSE PRACTITIONER

## 2023-11-24 PROCEDURE — 99213 OFFICE O/P EST LOW 20 MIN: CPT | Performed by: NURSE PRACTITIONER

## 2023-11-24 PROCEDURE — 87635 SARS-COV-2 COVID-19 AMP PRB: CPT | Performed by: NURSE PRACTITIONER

## 2023-11-24 PROCEDURE — G8427 DOCREV CUR MEDS BY ELIG CLIN: HCPCS | Performed by: NURSE PRACTITIONER

## 2023-11-24 PROCEDURE — G8484 FLU IMMUNIZE NO ADMIN: HCPCS | Performed by: NURSE PRACTITIONER

## 2023-11-24 PROCEDURE — 1036F TOBACCO NON-USER: CPT | Performed by: NURSE PRACTITIONER

## 2023-11-24 PROCEDURE — 1123F ACP DISCUSS/DSCN MKR DOCD: CPT | Performed by: NURSE PRACTITIONER

## 2023-11-24 PROCEDURE — G8419 CALC BMI OUT NRM PARAM NOF/U: HCPCS | Performed by: NURSE PRACTITIONER

## 2023-11-24 NOTE — PROGRESS NOTES
Chief Complaint   Patient presents with    Cough     States she started with dry cough last Wed but this has gotten better with drinking khurram tea and using Aspirin    Nasal Congestion     Some runny nose. . denies fever, chills or headaches          HPI:       is a 80 y.o. female. New Issues:  Presents with her brother today. She has been sick since last Wednesday, 11/15/23. Reports she had a cough, sore throat, nasal congestion, headache and fatigue. Feeling much better. Taking Aspirin at home and drinking Khurram Tea. Allergies   Allergen Reactions    Ciprofloxacin Rash       Current Outpatient Medications   Medication Sig Dispense Refill    amLODIPine (NORVASC) 5 MG tablet Take 1 tablet by mouth daily for blood pressure 90 tablet 3    omeprazole (PRILOSEC) 20 MG delayed release capsule       apixaban (ELIQUIS) 2.5 MG TABS tablet Take 1 tablet by mouth 2 times daily 60 tablet 11    omeprazole (PRILOSEC OTC) 20 MG tablet Take 1 tablet by mouth daily 30 tablet 11    acetaminophen (ACETAMINOPHEN 8 HOUR) 650 MG extended release tablet Take 1 tablet by mouth every 8 hours as needed for Pain 60 tablet 0    oxybutynin (DITROPAN-XL) 10 MG extended release tablet Take 1 tablet by mouth daily      spironolactone (ALDACTONE) 25 MG tablet TAKE 1/2 (ONE-HALF) TABLET BY MOUTH ONCE DAILY INDICATIONS PRESSURE AND FLUID      clotrimazole-betamethasone (LOTRISONE) 1-0.05 % cream Apply topically 2 times daily. 45 g 3    losartan (COZAAR) 25 MG tablet Take 0.5 tablets by mouth daily Indications: heart and pressure, replaces spironolactone 30 tablet 5    aspirin 81 MG EC tablet Take 1 tablet by mouth daily      atorvastatin (LIPITOR) 10 MG tablet Take 1 tablet by mouth once daily      vitamin D 25 MCG (1000 UT) CAPS Take 1 capsule by mouth daily       No current facility-administered medications for this visit.         Past Medical History:   Diagnosis Date    Calculus of kidney     Carotid stenosis     CKD

## 2023-12-15 RX ORDER — ATORVASTATIN CALCIUM 10 MG/1
TABLET, FILM COATED ORAL
Qty: 90 TABLET | Refills: 0 | Status: SHIPPED | OUTPATIENT
Start: 2023-12-15

## 2024-01-15 RX ORDER — AMLODIPINE BESYLATE 5 MG/1
5 TABLET ORAL DAILY
Qty: 90 TABLET | Refills: 3 | Status: SHIPPED | OUTPATIENT
Start: 2024-01-15 | End: 2024-01-16 | Stop reason: SDUPTHER

## 2024-01-17 RX ORDER — AMLODIPINE BESYLATE 5 MG/1
5 TABLET ORAL DAILY
Qty: 90 TABLET | Refills: 3 | Status: SHIPPED | OUTPATIENT
Start: 2024-01-17

## 2024-03-13 ENCOUNTER — OFFICE VISIT (OUTPATIENT)
Age: 89
End: 2024-03-13
Payer: MEDICARE

## 2024-03-13 ENCOUNTER — HOSPITAL ENCOUNTER (OUTPATIENT)
Facility: HOSPITAL | Age: 89
Discharge: HOME | End: 2024-03-16
Payer: MEDICARE

## 2024-03-13 VITALS
WEIGHT: 169.4 LBS | SYSTOLIC BLOOD PRESSURE: 119 MMHG | OXYGEN SATURATION: 95 % | TEMPERATURE: 98 F | HEIGHT: 67 IN | BODY MASS INDEX: 26.59 KG/M2 | DIASTOLIC BLOOD PRESSURE: 84 MMHG | RESPIRATION RATE: 18 BRPM | HEART RATE: 76 BPM

## 2024-03-13 DIAGNOSIS — I10 PRIMARY HYPERTENSION: Primary | ICD-10-CM

## 2024-03-13 DIAGNOSIS — I73.9 PERIPHERAL VASCULAR DISEASE (HCC): ICD-10-CM

## 2024-03-13 DIAGNOSIS — E78.2 MIXED HYPERLIPIDEMIA: ICD-10-CM

## 2024-03-13 DIAGNOSIS — Z86.73 HISTORY OF STROKE: ICD-10-CM

## 2024-03-13 DIAGNOSIS — R07.81 PLEURITIC CHEST PAIN: ICD-10-CM

## 2024-03-13 DIAGNOSIS — R73.9 HYPERGLYCEMIA: ICD-10-CM

## 2024-03-13 DIAGNOSIS — Z87.828 HISTORY OF KIDNEY INJURY: ICD-10-CM

## 2024-03-13 PROBLEM — I27.20 PULMONARY HYPERTENSION (HCC): Status: RESOLVED | Noted: 2023-03-01 | Resolved: 2024-03-13

## 2024-03-13 PROBLEM — N18.32 STAGE 3B CHRONIC KIDNEY DISEASE (HCC): Status: ACTIVE | Noted: 2024-03-13

## 2024-03-13 PROCEDURE — 99214 OFFICE O/P EST MOD 30 MIN: CPT | Performed by: FAMILY MEDICINE

## 2024-03-13 PROCEDURE — G8419 CALC BMI OUT NRM PARAM NOF/U: HCPCS | Performed by: FAMILY MEDICINE

## 2024-03-13 PROCEDURE — G8427 DOCREV CUR MEDS BY ELIG CLIN: HCPCS | Performed by: FAMILY MEDICINE

## 2024-03-13 PROCEDURE — 71046 X-RAY EXAM CHEST 2 VIEWS: CPT

## 2024-03-13 PROCEDURE — 1090F PRES/ABSN URINE INCON ASSESS: CPT | Performed by: FAMILY MEDICINE

## 2024-03-13 PROCEDURE — 1036F TOBACCO NON-USER: CPT | Performed by: FAMILY MEDICINE

## 2024-03-13 PROCEDURE — 1123F ACP DISCUSS/DSCN MKR DOCD: CPT | Performed by: FAMILY MEDICINE

## 2024-03-13 PROCEDURE — G8484 FLU IMMUNIZE NO ADMIN: HCPCS | Performed by: FAMILY MEDICINE

## 2024-03-13 ASSESSMENT — PATIENT HEALTH QUESTIONNAIRE - PHQ9
SUM OF ALL RESPONSES TO PHQ9 QUESTIONS 1 & 2: 0
2. FEELING DOWN, DEPRESSED OR HOPELESS: 0
SUM OF ALL RESPONSES TO PHQ QUESTIONS 1-9: 0
1. LITTLE INTEREST OR PLEASURE IN DOING THINGS: 0

## 2024-03-13 NOTE — PROGRESS NOTES
Needed No Help Needed   Bathing or showering No Help Needed No Help Needed No Help Needed   Walk across the room (includes cane/walker) Help Needed Help Needed No Help Needed   Using the telphone No Help Needed No Help Needed No Help Needed   Taking your medications No Help Needed No Help Needed No Help Needed   Preparing meals No Help Needed No Help Needed No Help Needed   Managing money (expenses/bills) No Help Needed No Help Needed No Help Needed   Moderately strenuous housework (laundry) Help Needed Help Needed No Help Needed   Shopping for personal items (toiletries/medicines) Help Needed No Help Needed No Help Needed   Shopping for groceries Help Needed No Help Needed No Help Needed   Driving Help Needed No Help Needed No Help Needed   Climbing a flight of stairs Help Needed Help Needed Help Needed   Getting to places beyond walking distances Help Needed Help Needed No Help Needed           3/13/2024    10:00 AM   AMB Abuse Screening   Do you ever feel afraid of your partner? N   Are you in a relationship with someone who physically or mentally threatens you? N   Is it safe for you to go home? Y       Advance Care Planning     The patient has appointed the following active healthcare agents:    Primary Decision Maker: Hilaria Samson - Child - [phone]    Primary Decision Maker: Michele Scott - Child - [phone]    Primary Decision Maker: Isa Caballero - Child - [phone]    Secondary Decision Maker: Kae De Jesus - Other - [phone]    Secondary Decision Maker: Catherine Scott - Other - [phone]  
Encounters:   03/13/24 119/84   09/18/23 122/68   08/19/23 (!) 151/67     Physical Examination: General appearance - alert, well appearing, and in no distress  Mental status - alert, oriented to person, place, and time  Eyes - pupils equal and reactive, extraocular eye movements intact.  ENT - bilateral external ears and nose normal. Normal lips  Neck - supple, no significant adenopathy, no thyromegaly or mass  Chest - clear to auscultation, no wheezes, rales or rhonchi, symmetric air entry. NTTP to the chest wall R side, no bruising or redness. No breast mass.  Heart - normal rate, regular rhythm today, S2, 2/6 BERHANE LUSB, no rubs, clicks or gallops  Extremities - peripheral pulses normal, trace pedal edema, no clubbing or cyanosis. R 2nd toe with marked hammer toe and heavy callus formation to the dorsal PIP.  R shoulder nttp with ROM, no TTP to the lateral epicondyle or with resisted anterior flexion. No impingement signs appreciated.    A/P:  TIA/HLD/Carotid stenosis/anticoagulation/ Pulm HTN  Sx well controlled. Con't eliquis, statin, ASA,and work on BP. Plan to con't ASA 81mg and eliquis together indefinitely, provided pt has no bleeding problems.     HTN and hx CKD  Pressures good today on low dose aldactone 12.5mg/d,  losartan 12.5mg/d, norvasc 10mg/d. Stay hydrated, rohan in hot weather. Monitor, check labs, adj PRN.    Hyperglycemia  Doing so/so lately. Last A1c great. May benefit from SGLT2 Tx PRN. Recheck today, adj PRN  Lab Results   Component Value Date    LABA1C 6.3 (H) 09/18/2023    LABA1C 6.5 (H) 05/25/2023    LABA1C 6.1 (H) 02/02/2022    GLUCOSE 141 (H) 09/18/2023    CREATININE 0.92 09/18/2023    LDLCALC 22.8 05/25/2023     R 2nd toe hammer toe  Treat with callus management and use corn pads, chafe prevention.    R upper chest wall discomfort  No obv signs. Ddx includes pleurisy,     Hx GERD and GI bleed prophy  con't prilosec, is on double anticoag indefinitely. CBC, iron ok 5/2023, so iron loss not

## 2024-03-14 LAB
ALBUMIN SERPL-MCNC: 3.4 G/DL (ref 3.5–5)
ALBUMIN/GLOB SERPL: 0.9 (ref 1.1–2.2)
ALP SERPL-CCNC: 102 U/L (ref 45–117)
ALT SERPL-CCNC: 16 U/L (ref 12–78)
ANION GAP SERPL CALC-SCNC: 3 MMOL/L (ref 5–15)
AST SERPL-CCNC: 14 U/L (ref 15–37)
BILIRUB SERPL-MCNC: 0.6 MG/DL (ref 0.2–1)
BUN SERPL-MCNC: 18 MG/DL (ref 6–20)
BUN/CREAT SERPL: 19 (ref 12–20)
CALCIUM SERPL-MCNC: 9.6 MG/DL (ref 8.5–10.1)
CHLORIDE SERPL-SCNC: 109 MMOL/L (ref 97–108)
CHOLEST SERPL-MCNC: 129 MG/DL
CO2 SERPL-SCNC: 29 MMOL/L (ref 21–32)
CREAT SERPL-MCNC: 0.95 MG/DL (ref 0.55–1.02)
CREAT UR-MCNC: 193 MG/DL
EST. AVERAGE GLUCOSE BLD GHB EST-MCNC: 120 MG/DL
GLOBULIN SER CALC-MCNC: 3.7 G/DL (ref 2–4)
GLUCOSE SERPL-MCNC: 90 MG/DL (ref 65–100)
HBA1C MFR BLD: 5.8 % (ref 4–5.6)
HDLC SERPL-MCNC: 86 MG/DL
HDLC SERPL: 1.5 (ref 0–5)
LDLC SERPL CALC-MCNC: 32.6 MG/DL (ref 0–100)
MICROALBUMIN UR-MCNC: 2.63 MG/DL
MICROALBUMIN/CREAT UR-RTO: 14 MG/G (ref 0–30)
POTASSIUM SERPL-SCNC: 3.5 MMOL/L (ref 3.5–5.1)
PROT SERPL-MCNC: 7.1 G/DL (ref 6.4–8.2)
SODIUM SERPL-SCNC: 141 MMOL/L (ref 136–145)
TRIGL SERPL-MCNC: 52 MG/DL
VLDLC SERPL CALC-MCNC: 10.4 MG/DL

## 2024-03-29 RX ORDER — ATORVASTATIN CALCIUM 10 MG/1
TABLET, FILM COATED ORAL
Qty: 90 TABLET | Refills: 3 | Status: SHIPPED | OUTPATIENT
Start: 2024-03-29

## 2024-04-01 ENCOUNTER — TELEPHONE (OUTPATIENT)
Age: 89
End: 2024-04-01

## 2024-04-01 RX ORDER — AMLODIPINE BESYLATE 5 MG/1
5 TABLET ORAL DAILY
Qty: 90 TABLET | Refills: 3 | Status: SHIPPED | OUTPATIENT
Start: 2024-04-01

## 2024-04-01 NOTE — TELEPHONE ENCOUNTER
Pt requesting med refill for Amlodipine 5 mg be called in to [facility] [place], [place]

## 2024-07-31 ENCOUNTER — TELEPHONE (OUTPATIENT)
Age: 89
End: 2024-07-31

## 2024-07-31 DIAGNOSIS — I10 PRIMARY HYPERTENSION: Primary | ICD-10-CM

## 2024-07-31 DIAGNOSIS — R60.0 PEDAL EDEMA: ICD-10-CM

## 2024-07-31 RX ORDER — SPIRONOLACTONE 25 MG/1
TABLET ORAL
Qty: 45 TABLET | Refills: 3 | Status: SHIPPED | OUTPATIENT
Start: 2024-07-31

## 2024-07-31 NOTE — TELEPHONE ENCOUNTER
Hilaria (daughter) states Mrs. Muro has swelling in legs & ankles since May 2024.  Requesting Spironolactone 25 mg be called in to [facility] [place].

## 2024-10-01 ENCOUNTER — OFFICE VISIT (OUTPATIENT)
Age: 89
End: 2024-10-01
Payer: MEDICARE

## 2024-10-01 VITALS
RESPIRATION RATE: 18 BRPM | DIASTOLIC BLOOD PRESSURE: 61 MMHG | OXYGEN SATURATION: 99 % | TEMPERATURE: 97.8 F | WEIGHT: 157.4 LBS | BODY MASS INDEX: 24.71 KG/M2 | HEART RATE: 72 BPM | SYSTOLIC BLOOD PRESSURE: 123 MMHG | HEIGHT: 67 IN

## 2024-10-01 DIAGNOSIS — R73.9 HYPERGLYCEMIA: ICD-10-CM

## 2024-10-01 DIAGNOSIS — I35.8 AORTIC VALVE SCLEROSIS: ICD-10-CM

## 2024-10-01 DIAGNOSIS — I10 PRIMARY HYPERTENSION: ICD-10-CM

## 2024-10-01 DIAGNOSIS — I27.20 PULMONARY HYPERTENSION (HCC): ICD-10-CM

## 2024-10-01 DIAGNOSIS — N18.31 CKD STAGE 3A, GFR 45-59 ML/MIN (HCC): ICD-10-CM

## 2024-10-01 DIAGNOSIS — Z87.828 HISTORY OF KIDNEY INJURY: ICD-10-CM

## 2024-10-01 DIAGNOSIS — Z87.39 HX OF ACUTE GOUTY ARTHRITIS: ICD-10-CM

## 2024-10-01 DIAGNOSIS — E78.2 MIXED HYPERLIPIDEMIA: ICD-10-CM

## 2024-10-01 DIAGNOSIS — Z23 NEEDS FLU SHOT: ICD-10-CM

## 2024-10-01 DIAGNOSIS — Z86.73 HISTORY OF TIA (TRANSIENT ISCHEMIC ATTACK): ICD-10-CM

## 2024-10-01 DIAGNOSIS — I65.23 BILATERAL CAROTID ARTERY STENOSIS: ICD-10-CM

## 2024-10-01 DIAGNOSIS — Z00.00 MEDICARE ANNUAL WELLNESS VISIT, SUBSEQUENT: Primary | ICD-10-CM

## 2024-10-01 PROCEDURE — 90653 IIV ADJUVANT VACCINE IM: CPT | Performed by: FAMILY MEDICINE

## 2024-10-01 PROCEDURE — G8482 FLU IMMUNIZE ORDER/ADMIN: HCPCS | Performed by: FAMILY MEDICINE

## 2024-10-01 PROCEDURE — 1090F PRES/ABSN URINE INCON ASSESS: CPT | Performed by: FAMILY MEDICINE

## 2024-10-01 PROCEDURE — 1123F ACP DISCUSS/DSCN MKR DOCD: CPT | Performed by: FAMILY MEDICINE

## 2024-10-01 PROCEDURE — G0008 ADMIN INFLUENZA VIRUS VAC: HCPCS | Performed by: FAMILY MEDICINE

## 2024-10-01 PROCEDURE — 1036F TOBACCO NON-USER: CPT | Performed by: FAMILY MEDICINE

## 2024-10-01 PROCEDURE — G8420 CALC BMI NORM PARAMETERS: HCPCS | Performed by: FAMILY MEDICINE

## 2024-10-01 PROCEDURE — 99214 OFFICE O/P EST MOD 30 MIN: CPT | Performed by: FAMILY MEDICINE

## 2024-10-01 PROCEDURE — G0439 PPPS, SUBSEQ VISIT: HCPCS | Performed by: FAMILY MEDICINE

## 2024-10-01 PROCEDURE — G8427 DOCREV CUR MEDS BY ELIG CLIN: HCPCS | Performed by: FAMILY MEDICINE

## 2024-10-01 RX ORDER — LOSARTAN POTASSIUM 25 MG/1
12.5 TABLET ORAL DAILY
Qty: 45 TABLET | Refills: 3 | Status: SHIPPED | OUTPATIENT
Start: 2024-10-01

## 2024-10-01 SDOH — ECONOMIC STABILITY: FOOD INSECURITY: WITHIN THE PAST 12 MONTHS, YOU WORRIED THAT YOUR FOOD WOULD RUN OUT BEFORE YOU GOT MONEY TO BUY MORE.: NEVER TRUE

## 2024-10-01 SDOH — ECONOMIC STABILITY: FOOD INSECURITY: WITHIN THE PAST 12 MONTHS, THE FOOD YOU BOUGHT JUST DIDN'T LAST AND YOU DIDN'T HAVE MONEY TO GET MORE.: NEVER TRUE

## 2024-10-01 SDOH — ECONOMIC STABILITY: INCOME INSECURITY: HOW HARD IS IT FOR YOU TO PAY FOR THE VERY BASICS LIKE FOOD, HOUSING, MEDICAL CARE, AND HEATING?: NOT HARD AT ALL

## 2024-10-01 ASSESSMENT — PATIENT HEALTH QUESTIONNAIRE - PHQ9
SUM OF ALL RESPONSES TO PHQ QUESTIONS 1-9: 0
SUM OF ALL RESPONSES TO PHQ QUESTIONS 1-9: 0
1. LITTLE INTEREST OR PLEASURE IN DOING THINGS: NOT AT ALL
2. FEELING DOWN, DEPRESSED OR HOPELESS: NOT AT ALL
SUM OF ALL RESPONSES TO PHQ QUESTIONS 1-9: 0
SUM OF ALL RESPONSES TO PHQ9 QUESTIONS 1 & 2: 0
SUM OF ALL RESPONSES TO PHQ QUESTIONS 1-9: 0

## 2024-10-01 NOTE — PROGRESS NOTES
Michele Muro is a 89 y.o. female presenting for/with:    Chief Complaint   Patient presents with    Medicare AWV    Immunizations     Flu shot given (right arm)       Vitals:    10/01/24 1048   BP: 123/61   Site: Left Upper Arm   Position: Sitting   Cuff Size: Medium Adult   Pulse: 72   Resp: 18   Temp: 97.8 °F (36.6 °C)   TempSrc: Temporal   SpO2: 99%   Weight: 71.4 kg (157 lb 6.4 oz)   Height: 1.702 m (5' 7\")       Pain Scale: 0 - No pain/10  Pain Location:     \"Have you been to the ER, urgent care clinic since your last visit?  Hospitalized since your last visit?\"    NO    “Have you seen or consulted any other health care providers outside of [facility] since your last visit?”    NO                 10/1/2024    10:45 AM   PHQ-9    Little interest or pleasure in doing things 0   Feeling down, depressed, or hopeless 0   PHQ-2 Score 0   PHQ-9 Total Score 0           3/13/2024     9:50 AM 8/11/2023     4:30 PM 7/6/2023     2:20 PM 6/15/2022    12:00 AM 2/9/2022    12:00 AM 9/7/2021    12:00 AM 6/7/2021    12:00 AM   [facility] AMB LEARNING ASSESSMENT   Primary Learner Patient Patient Patient Patient Patient Patient Patient   co-learner caregiver   Yes       Co-Learner Name   dtr       Primary Language ENGLISH ENGLISH ENGLISH ENGLISH ENGLISH ENGLISH ENGLISH   Learning Preference DEMONSTRATION DEMONSTRATION DEMONSTRATION DEMONSTRATION READING READING DEMONSTRATION   Answered By patient patient pt patient Patient patient patient   Relationship to Learner SELF SELF SELF SELF SELF SELF SELF            10/1/2024    10:46 AM   Amb Fall Risk Assessment and TUG Test   Do you feel unsteady or are you worried about falling?  yes   2 or more falls in past year? no   Fall with injury in past year? no           10/1/2024    10:00 AM 3/13/2024    10:00 AM 8/11/2023     3:00 PM 6/5/2023     3:00 PM   ADL ASSESSMENT   Feeding yourself No Help Needed No Help Needed No Help Needed No Help Needed   Getting from bed to chair 
care):   Patient Care Team:  Oziel Ramirez MD as PCP - General (Family Medicine)  Oziel Ramirez MD as PCP - Empaneled Provider      Reviewed and updated this visit:  Tobacco  Allergies  Meds  Problems  Med Hx  Surg Hx  Soc Hx  Fam Hx

## 2024-10-01 NOTE — PATIENT INSTRUCTIONS
To check on medicaid eligibility, you can go to Home  [email or web address] [email or web address]  RSV vaccine and new COVID omicron vaccines due at your pharmacy. Please get when available, can help prevent severe lung disease. Please consider getting the Shingrix (x2), and the TdAP (tetanus and whooping cough) at your pharmacy when ready    Preventing Falls: Care Instructions  Injuries and health problems such as trouble walking or poor eyesight can increase your risk of falling. So can some medicines. But there are things you can do to help prevent falls. You can exercise to get stronger. You can also arrange your home to make it safer.    Talk to your doctor about the medicines you take. Ask if any of them increase the risk of falls and whether they can be changed or stopped.   Try to exercise regularly. It can help improve your strength and balance. This can help lower your risk of falling.         Practice fall safety and prevention.   Wear low-heeled shoes that fit well and give your feet good support. Talk to your doctor if you have foot problems that make this hard.  Carry a cellphone or wear a medical alert device that you can use to call for help.  Use stepladders instead of chairs to reach high objects. Don't climb if you're at risk for falls. Ask for help, if needed.  Wear the correct eyeglasses, if you need them.        Make your home safer.   Remove rugs, cords, clutter, and furniture from walkways.  Keep your house well lit. Use night-lights in hallways and bathrooms.  Install and use sturdy handrails on stairways.  Wear nonskid footwear, even inside. Don't walk barefoot or in socks without shoes.        Be safe outside.   Use handrails, curb cuts, and ramps whenever possible.  Keep your hands free by using a shoulder bag or backpack.  Try to walk in well-lit areas. Watch out for uneven ground, changes in pavement, and debris.  Be careful in the winter. Walk on the grass or gravel when

## 2024-10-04 LAB
ANION GAP SERPL CALC-SCNC: 5 MMOL/L (ref 2–12)
BUN SERPL-MCNC: 23 MG/DL (ref 6–20)
BUN/CREAT SERPL: 19 (ref 12–20)
CALCIUM SERPL-MCNC: 9.3 MG/DL (ref 8.5–10.1)
CHLORIDE SERPL-SCNC: 107 MMOL/L (ref 97–108)
CO2 SERPL-SCNC: 28 MMOL/L (ref 21–32)
CREAT SERPL-MCNC: 1.23 MG/DL (ref 0.55–1.02)
EST. AVERAGE GLUCOSE BLD GHB EST-MCNC: 126 MG/DL
GLUCOSE SERPL-MCNC: 101 MG/DL (ref 65–100)
HBA1C MFR BLD: 6 % (ref 4–5.6)
HCT VFR BLD AUTO: 40.2 % (ref 35–47)
HGB BLD-MCNC: 13 G/DL (ref 11.5–16)
POTASSIUM SERPL-SCNC: 4.1 MMOL/L (ref 3.5–5.1)
SODIUM SERPL-SCNC: 140 MMOL/L (ref 136–145)
URATE SERPL-MCNC: 5.6 MG/DL (ref 2.6–6)

## 2024-11-06 ENCOUNTER — TELEPHONE (OUTPATIENT)
Age: 89
End: 2024-11-06

## 2024-11-06 DIAGNOSIS — K21.00 GASTROESOPHAGEAL REFLUX DISEASE WITH ESOPHAGITIS, UNSPECIFIED WHETHER HEMORRHAGE: Primary | ICD-10-CM

## 2025-01-31 ENCOUNTER — TELEPHONE (OUTPATIENT)
Age: 89
End: 2025-01-31

## 2025-01-31 NOTE — TELEPHONE ENCOUNTER
On call. Recd a call from patients family member that patient is out of her eliquis 2.5 mg BID. She is requesting a 7 day refill until mail order arrives. Patients daughter is very concerned and already contacted the pharmacy for a 3 day supply. She is requesting a week supply until mail order norm. . She does not live local and it is difficult for the patient to keep up with her medications. I will send in a week supply only. Advised she needs to contact her PCP for additional refills

## 2025-02-03 RX ORDER — APIXABAN 2.5 MG/1
2.5 TABLET, FILM COATED ORAL 2 TIMES DAILY
Qty: 2 TABLET | Refills: 0 | OUTPATIENT
Start: 2025-02-03

## 2025-02-03 RX ORDER — APIXABAN 2.5 MG/1
2.5 TABLET, FILM COATED ORAL 2 TIMES DAILY
Qty: 60 TABLET | Refills: 11 | Status: SHIPPED | OUTPATIENT
Start: 2025-02-03

## 2025-03-03 ENCOUNTER — OFFICE VISIT (OUTPATIENT)
Age: 89
End: 2025-03-03
Payer: MEDICARE

## 2025-03-03 VITALS
TEMPERATURE: 97.8 F | SYSTOLIC BLOOD PRESSURE: 157 MMHG | HEART RATE: 70 BPM | OXYGEN SATURATION: 96 % | WEIGHT: 161.8 LBS | BODY MASS INDEX: 25.39 KG/M2 | HEIGHT: 67 IN | DIASTOLIC BLOOD PRESSURE: 70 MMHG | RESPIRATION RATE: 16 BRPM

## 2025-03-03 DIAGNOSIS — I35.8 AORTIC VALVE SCLEROSIS: ICD-10-CM

## 2025-03-03 DIAGNOSIS — E78.2 MIXED HYPERLIPIDEMIA: ICD-10-CM

## 2025-03-03 DIAGNOSIS — I73.9 PERIPHERAL VASCULAR DISEASE: ICD-10-CM

## 2025-03-03 DIAGNOSIS — I10 PRIMARY HYPERTENSION: Primary | ICD-10-CM

## 2025-03-03 DIAGNOSIS — R73.9 HYPERGLYCEMIA: ICD-10-CM

## 2025-03-03 DIAGNOSIS — N18.32 STAGE 3B CHRONIC KIDNEY DISEASE (HCC): ICD-10-CM

## 2025-03-03 DIAGNOSIS — Z86.73 HISTORY OF STROKE: ICD-10-CM

## 2025-03-03 DIAGNOSIS — Z79.01 ANTICOAGULATED: ICD-10-CM

## 2025-03-03 PROCEDURE — 1160F RVW MEDS BY RX/DR IN RCRD: CPT | Performed by: FAMILY MEDICINE

## 2025-03-03 PROCEDURE — G8427 DOCREV CUR MEDS BY ELIG CLIN: HCPCS | Performed by: FAMILY MEDICINE

## 2025-03-03 PROCEDURE — G8419 CALC BMI OUT NRM PARAM NOF/U: HCPCS | Performed by: FAMILY MEDICINE

## 2025-03-03 PROCEDURE — 1090F PRES/ABSN URINE INCON ASSESS: CPT | Performed by: FAMILY MEDICINE

## 2025-03-03 PROCEDURE — 1126F AMNT PAIN NOTED NONE PRSNT: CPT | Performed by: FAMILY MEDICINE

## 2025-03-03 PROCEDURE — 99214 OFFICE O/P EST MOD 30 MIN: CPT | Performed by: FAMILY MEDICINE

## 2025-03-03 PROCEDURE — 1036F TOBACCO NON-USER: CPT | Performed by: FAMILY MEDICINE

## 2025-03-03 PROCEDURE — 1123F ACP DISCUSS/DSCN MKR DOCD: CPT | Performed by: FAMILY MEDICINE

## 2025-03-03 PROCEDURE — 1159F MED LIST DOCD IN RCRD: CPT | Performed by: FAMILY MEDICINE

## 2025-03-03 RX ORDER — LOSARTAN POTASSIUM 25 MG/1
25 TABLET ORAL DAILY
Qty: 90 TABLET | Refills: 3 | Status: SHIPPED | OUTPATIENT
Start: 2025-03-03

## 2025-03-03 SDOH — ECONOMIC STABILITY: FOOD INSECURITY: WITHIN THE PAST 12 MONTHS, YOU WORRIED THAT YOUR FOOD WOULD RUN OUT BEFORE YOU GOT MONEY TO BUY MORE.: NEVER TRUE

## 2025-03-03 SDOH — ECONOMIC STABILITY: FOOD INSECURITY: WITHIN THE PAST 12 MONTHS, THE FOOD YOU BOUGHT JUST DIDN'T LAST AND YOU DIDN'T HAVE MONEY TO GET MORE.: NEVER TRUE

## 2025-03-03 ASSESSMENT — PATIENT HEALTH QUESTIONNAIRE - PHQ9
2. FEELING DOWN, DEPRESSED OR HOPELESS: NOT AT ALL
1. LITTLE INTEREST OR PLEASURE IN DOING THINGS: NOT AT ALL
SUM OF ALL RESPONSES TO PHQ QUESTIONS 1-9: 0

## 2025-03-03 NOTE — PROGRESS NOTES
Michele Muro is a 89 y.o. female presenting for/with:    Chief Complaint   Patient presents with    Hypertension    Cholesterol Problem    Chronic Kidney Disease    Other     Would like to get Eliquis in 5mg tablets for cost        Vitals:    03/03/25 1151   BP: (!) 157/70   Site: Left Upper Arm   Position: Sitting   Pulse: 70   Resp: 16   Temp: 97.8 °F (36.6 °C)   TempSrc: Temporal   SpO2: 96%   Weight: 73.4 kg (161 lb 12.8 oz)   Height: 1.702 m (5' 7\")       Pain Scale: 0 - No pain/10  Pain Location:     \"Have you been to the ER, urgent care clinic since your last visit?  Hospitalized since your last visit?\"    NO    “Have you seen or consulted any other health care providers outside of [facility] since your last visit?”    NO                 3/3/2025    11:47 AM   PHQ-9    Little interest or pleasure in doing things 0   Feeling down, depressed, or hopeless 0   PHQ-2 Score 0   PHQ-9 Total Score 0           3/13/2024     9:50 AM 8/11/2023     4:30 PM 7/6/2023     2:20 PM 6/15/2022    12:00 AM 2/9/2022    12:00 AM 9/7/2021    12:00 AM 6/7/2021    12:00 AM   [facility] AMB LEARNING ASSESSMENT   Primary Learner Patient Patient Patient Patient Patient Patient Patient   co-learner caregiver   Yes       Co-Learner Name   dtr       Primary Language ENGLISH ENGLISH ENGLISH ENGLISH ENGLISH ENGLISH ENGLISH   Learning Preference DEMONSTRATION DEMONSTRATION DEMONSTRATION DEMONSTRATION READING READING DEMONSTRATION   Answered By patient patient pt patient Patient patient patient   Relationship to Learner SELF SELF SELF SELF SELF SELF SELF            10/1/2024    10:46 AM   Amb Fall Risk Assessment and TUG Test   Do you feel unsteady or are you worried about falling?  yes   2 or more falls in past year? no   Fall with injury in past year? no           3/3/2025    11:00 AM 10/1/2024    10:00 AM 3/13/2024    10:00 AM 8/11/2023     3:00 PM 6/5/2023     3:00 PM   ADL ASSESSMENT   Feeding yourself No Help Needed No Help

## 2025-03-03 NOTE — PATIENT INSTRUCTIONS
RSV vaccine due at your pharmacy. Please get when available, can help prevent severe lung disease.

## 2025-03-03 NOTE — PROGRESS NOTES
Michele Muro is a 89 y.o. female  Chief Complaint   Patient presents with    Hypertension    Cholesterol Problem    Chronic Kidney Disease    Other     Would like to get Eliquis in 5mg tablets for cost      HPI:  F/U TIA/CVA  No spells of dizziness since last year. Past workup shows old ASCVD changes and old microvascular changes. Con't on ASA 81mg every day, eliquis 2.5 BID, Remains on BP pills, and statin. No BRBPR since last visit.   Lab Results   Component Value Date    WBC 6.6 05/25/2023    HGB 13.0 10/01/2024    HCT 40.2 10/01/2024    MCV 91.6 05/25/2023     05/25/2023     Hypertension.   Blood pressures up lately. Has been eating better and gaining a little weight. Management at our last visit at included con't losartan 12.5mg/d, and Norvasc 5mg daily. May or may not be on aldactone 12.5mg, pt not sure, thinks not. Current regimen: ARB, calcium channel blocker.  Symptoms include no sx. minimal pedal edema. Patient denies headache, palpitations. Last GFR good.  Lab review:   Lab Results   Component Value Date/Time     10/01/2024 11:33 AM    K 4.1 10/01/2024 11:33 AM     10/01/2024 11:33 AM    CO2 28 10/01/2024 11:33 AM    BUN 23 10/01/2024 11:33 AM    CREATININE 1.23 10/01/2024 11:33 AM    GLUCOSE 101 10/01/2024 11:33 AM    GLUCOSE 141 09/18/2023 12:00 AM    CALCIUM 9.3 10/01/2024 11:33 AM    LABGLOM 42 10/01/2024 11:33 AM    LABGLOM 57 03/13/2024 10:58 AM    LABGLOM 60 09/18/2023 12:00 AM      Hyperlipidemia/ ASCVD.  On lipitor 10mg, ASA, eliquis Jenn well. No myalgias, arthralgias, unusual weakness.  Lab Results   Component Value Date    CHOL 129 03/13/2024    HDL 86 03/13/2024    TRIG 52 03/13/2024    AST 14 (L) 03/13/2024    ALT 16 03/13/2024    ALKPHOS 102 03/13/2024    BILITOT 0.6 03/13/2024     Gout  Current control good.  Current symptoms  none.  Typical location left first MTP joint  Last flareup: summer 2021  Number of flareups in past year:0  Current treatment: diet,

## 2025-03-06 ENCOUNTER — LAB (OUTPATIENT)
Age: 89
End: 2025-03-06

## 2025-03-06 DIAGNOSIS — E78.2 MIXED HYPERLIPIDEMIA: ICD-10-CM

## 2025-03-06 DIAGNOSIS — Z79.01 ANTICOAGULATED: ICD-10-CM

## 2025-03-06 DIAGNOSIS — N18.32 STAGE 3B CHRONIC KIDNEY DISEASE (HCC): ICD-10-CM

## 2025-03-06 DIAGNOSIS — I10 PRIMARY HYPERTENSION: ICD-10-CM

## 2025-03-06 DIAGNOSIS — Z86.73 HISTORY OF STROKE: ICD-10-CM

## 2025-03-06 DIAGNOSIS — R73.9 HYPERGLYCEMIA: ICD-10-CM

## 2025-03-06 DIAGNOSIS — I73.9 PERIPHERAL VASCULAR DISEASE: ICD-10-CM

## 2025-03-07 LAB
ALBUMIN SERPL-MCNC: 3.4 G/DL (ref 3.5–5)
ALBUMIN/GLOB SERPL: 0.9 (ref 1.1–2.2)
ALP SERPL-CCNC: 99 U/L (ref 45–117)
ALT SERPL-CCNC: 19 U/L (ref 12–78)
ANION GAP SERPL CALC-SCNC: 7 MMOL/L (ref 2–12)
AST SERPL-CCNC: 8 U/L (ref 15–37)
BILIRUB SERPL-MCNC: 0.4 MG/DL (ref 0.2–1)
BUN SERPL-MCNC: 20 MG/DL (ref 6–20)
BUN/CREAT SERPL: 19 (ref 12–20)
CALCIUM SERPL-MCNC: 9.2 MG/DL (ref 8.5–10.1)
CHLORIDE SERPL-SCNC: 108 MMOL/L (ref 97–108)
CHOLEST SERPL-MCNC: 134 MG/DL
CO2 SERPL-SCNC: 28 MMOL/L (ref 21–32)
CREAT SERPL-MCNC: 1.04 MG/DL (ref 0.55–1.02)
EST. AVERAGE GLUCOSE BLD GHB EST-MCNC: 123 MG/DL
GLOBULIN SER CALC-MCNC: 3.6 G/DL (ref 2–4)
GLUCOSE SERPL-MCNC: 87 MG/DL (ref 65–100)
HBA1C MFR BLD: 5.9 % (ref 4–5.6)
HCT VFR BLD AUTO: 40.4 % (ref 35–47)
HDLC SERPL-MCNC: 93 MG/DL
HDLC SERPL: 1.4 (ref 0–5)
HGB BLD-MCNC: 12.7 G/DL (ref 11.5–16)
LDLC SERPL CALC-MCNC: 29.8 MG/DL (ref 0–100)
POTASSIUM SERPL-SCNC: 3.6 MMOL/L (ref 3.5–5.1)
PROT SERPL-MCNC: 7 G/DL (ref 6.4–8.2)
SODIUM SERPL-SCNC: 143 MMOL/L (ref 136–145)
TRIGL SERPL-MCNC: 56 MG/DL
VLDLC SERPL CALC-MCNC: 11.2 MG/DL

## 2025-03-31 RX ORDER — ATORVASTATIN CALCIUM 10 MG/1
TABLET, FILM COATED ORAL
Qty: 90 TABLET | Refills: 3 | Status: SHIPPED | OUTPATIENT
Start: 2025-03-31

## 2025-06-10 ENCOUNTER — OFFICE VISIT (OUTPATIENT)
Age: 89
End: 2025-06-10
Payer: MEDICARE

## 2025-06-10 VITALS
BODY MASS INDEX: 25.15 KG/M2 | HEIGHT: 67 IN | OXYGEN SATURATION: 99 % | TEMPERATURE: 97.8 F | DIASTOLIC BLOOD PRESSURE: 85 MMHG | WEIGHT: 160.2 LBS | HEART RATE: 71 BPM | SYSTOLIC BLOOD PRESSURE: 145 MMHG | RESPIRATION RATE: 18 BRPM

## 2025-06-10 DIAGNOSIS — Z86.73 HISTORY OF STROKE: ICD-10-CM

## 2025-06-10 DIAGNOSIS — I10 PRIMARY HYPERTENSION: ICD-10-CM

## 2025-06-10 DIAGNOSIS — E78.2 MIXED HYPERLIPIDEMIA: ICD-10-CM

## 2025-06-10 DIAGNOSIS — M77.42 METATARSALGIA OF LEFT FOOT: Primary | ICD-10-CM

## 2025-06-10 DIAGNOSIS — N18.31 STAGE 3A CHRONIC KIDNEY DISEASE (HCC): ICD-10-CM

## 2025-06-10 PROCEDURE — 1036F TOBACCO NON-USER: CPT | Performed by: FAMILY MEDICINE

## 2025-06-10 PROCEDURE — 1159F MED LIST DOCD IN RCRD: CPT | Performed by: FAMILY MEDICINE

## 2025-06-10 PROCEDURE — G8419 CALC BMI OUT NRM PARAM NOF/U: HCPCS | Performed by: FAMILY MEDICINE

## 2025-06-10 PROCEDURE — 1123F ACP DISCUSS/DSCN MKR DOCD: CPT | Performed by: FAMILY MEDICINE

## 2025-06-10 PROCEDURE — 99214 OFFICE O/P EST MOD 30 MIN: CPT | Performed by: FAMILY MEDICINE

## 2025-06-10 PROCEDURE — G8427 DOCREV CUR MEDS BY ELIG CLIN: HCPCS | Performed by: FAMILY MEDICINE

## 2025-06-10 PROCEDURE — 1090F PRES/ABSN URINE INCON ASSESS: CPT | Performed by: FAMILY MEDICINE

## 2025-06-10 NOTE — PATIENT INSTRUCTIONS
TdAP (tetanus) vaccine and new Shingles vaccines recommended at your pharmacy. Please get when available, can help prevent severe disease.

## 2025-06-10 NOTE — PROGRESS NOTES
Michele Muro is a 89 y.o. female  Chief Complaint   Patient presents with    Toe Pain     Patient states that she is experiencing toe pain on both feet.       HPI:  Calluses and corns to L foot  With painful callus. Painful with wearing shoes. Has been filing down lately, helps, but still sore. Better with donut pad.     F/U TIA/CVA  No spells of dizziness since last year. Past workup shows old ASCVD changes and old microvascular changes. Con't on ASA 81mg every day, eliquis 2.5 BID, Remains on BP pills, and statin. No BRBPR since last visit.   Lab Results   Component Value Date    WBC 6.6 05/25/2023    HGB 12.7 03/06/2025    HCT 40.4 03/06/2025    MCV 91.6 05/25/2023     05/25/2023     Hypertension.   Blood pressures fair lately. Management at our last visit at included boost losartan to 25mg/d, and con't Norvasc 5mg daily. Current regimen: ARB, calcium channel blocker.  Symptoms include no sx. minimal pedal edema. Patient denies headache, palpitations. Last GFR good.  Lab review:   Lab Results   Component Value Date/Time     03/06/2025 11:21 AM    K 3.6 03/06/2025 11:21 AM     03/06/2025 11:21 AM    CO2 28 03/06/2025 11:21 AM    BUN 20 03/06/2025 11:21 AM    CREATININE 1.04 03/06/2025 11:21 AM    GLUCOSE 87 03/06/2025 11:21 AM    GLUCOSE 141 09/18/2023 12:00 AM    CALCIUM 9.2 03/06/2025 11:21 AM    LABGLOM 51 03/06/2025 11:21 AM    LABGLOM 57 03/13/2024 10:58 AM    LABGLOM 60 09/18/2023 12:00 AM      Hyperlipidemia/ ASCVD.  On lipitor 10mg, ASA, eliquis Jenn well. No myalgias, arthralgias, unusual weakness.  Lab Results   Component Value Date    CHOL 134 03/06/2025    HDL 93 03/06/2025    TRIG 56 03/06/2025    AST 8 (L) 03/06/2025    ALT 19 03/06/2025    ALKPHOS 99 03/06/2025    BILITOT 0.4 03/06/2025     Gout  Current control good.  Current symptoms  none.  Typical location left first MTP joint  Last flareup: summer 2021  Number of flareups in past year:0  Current treatment: diet, 
Child - [phone]    Secondary Decision Maker: Kae De Jesus - Other - [phone]    Secondary Decision Maker: Catherine Scott - Other - [phone]

## 2025-06-30 RX ORDER — AMLODIPINE BESYLATE 5 MG/1
5 TABLET ORAL DAILY
Qty: 90 TABLET | Refills: 3 | OUTPATIENT
Start: 2025-06-30

## 2025-06-30 RX ORDER — AMLODIPINE BESYLATE 5 MG/1
5 TABLET ORAL DAILY
Qty: 90 TABLET | Refills: 3 | Status: SHIPPED | OUTPATIENT
Start: 2025-06-30

## 2025-07-18 ENCOUNTER — OFFICE VISIT (OUTPATIENT)
Age: 89
End: 2025-07-18
Payer: MEDICARE

## 2025-07-18 VITALS
SYSTOLIC BLOOD PRESSURE: 136 MMHG | TEMPERATURE: 97.9 F | OXYGEN SATURATION: 94 % | HEART RATE: 63 BPM | DIASTOLIC BLOOD PRESSURE: 82 MMHG | RESPIRATION RATE: 16 BRPM

## 2025-07-18 DIAGNOSIS — M17.11 PRIMARY OSTEOARTHRITIS OF RIGHT KNEE: Primary | ICD-10-CM

## 2025-07-18 PROCEDURE — 1160F RVW MEDS BY RX/DR IN RCRD: CPT | Performed by: FAMILY MEDICINE

## 2025-07-18 PROCEDURE — G8419 CALC BMI OUT NRM PARAM NOF/U: HCPCS | Performed by: FAMILY MEDICINE

## 2025-07-18 PROCEDURE — 1126F AMNT PAIN NOTED NONE PRSNT: CPT | Performed by: FAMILY MEDICINE

## 2025-07-18 PROCEDURE — 1159F MED LIST DOCD IN RCRD: CPT | Performed by: FAMILY MEDICINE

## 2025-07-18 PROCEDURE — 1123F ACP DISCUSS/DSCN MKR DOCD: CPT | Performed by: FAMILY MEDICINE

## 2025-07-18 PROCEDURE — 99213 OFFICE O/P EST LOW 20 MIN: CPT | Performed by: FAMILY MEDICINE

## 2025-07-18 PROCEDURE — G8427 DOCREV CUR MEDS BY ELIG CLIN: HCPCS | Performed by: FAMILY MEDICINE

## 2025-07-18 PROCEDURE — 1036F TOBACCO NON-USER: CPT | Performed by: FAMILY MEDICINE

## 2025-07-18 PROCEDURE — 1090F PRES/ABSN URINE INCON ASSESS: CPT | Performed by: FAMILY MEDICINE

## 2025-07-18 RX ORDER — PREDNISONE 20 MG/1
40 TABLET ORAL
Qty: 10 TABLET | Refills: 0 | Status: SHIPPED | OUTPATIENT
Start: 2025-07-18 | End: 2025-07-23

## 2025-07-18 ASSESSMENT — PATIENT HEALTH QUESTIONNAIRE - PHQ9
1. LITTLE INTEREST OR PLEASURE IN DOING THINGS: NOT AT ALL
SUM OF ALL RESPONSES TO PHQ QUESTIONS 1-9: 0
2. FEELING DOWN, DEPRESSED OR HOPELESS: NOT AT ALL
SUM OF ALL RESPONSES TO PHQ QUESTIONS 1-9: 0

## 2025-07-18 NOTE — PROGRESS NOTES
Michele Muro is a 89 y.o. female presenting for/with:    Chief Complaint   Patient presents with    Knee Pain     R knee pain x 1 week .. Painful when she goes to stand and at times it has given out on her        Vitals:    07/18/25 1542   BP: 136/82   BP Site: Right Upper Arm   Patient Position: Sitting   Pulse: 63   Resp: 16   Temp: 97.9 °F (36.6 °C)   TempSrc: Temporal   SpO2: 94%       Pain Scale: 0 - No pain/10  Pain Location:     \"Have you been to the ER, urgent care clinic since your last visit?  Hospitalized since your last visit?\"    NO    “Have you seen or consulted any other health care providers outside of [facility] since your last visit?”    NO                 7/18/2025     3:41 PM   PHQ-9    Little interest or pleasure in doing things 0   Feeling down, depressed, or hopeless 0   PHQ-2 Score 0   PHQ-9 Total Score 0           3/13/2024     9:50 AM 8/11/2023     4:30 PM 7/6/2023     2:20 PM 6/15/2022    12:00 AM 2/9/2022    12:00 AM 9/7/2021    12:00 AM 6/7/2021    12:00 AM   [facility] AMB LEARNING ASSESSMENT   Primary Learner Patient Patient Patient Patient Patient Patient Patient   co-learner caregiver   Yes       Co-Learner Name   dtr       Primary Language ENGLISH ENGLISH ENGLISH ENGLISH ENGLISH ENGLISH ENGLISH   Learning Preference DEMONSTRATION DEMONSTRATION DEMONSTRATION DEMONSTRATION READING READING DEMONSTRATION   Answered By patient patient pt patient Patient patient patient   Relationship to Learner SELF SELF SELF SELF SELF SELF SELF            10/1/2024    10:46 AM   Amb Fall Risk Assessment and TUG Test   Do you feel unsteady or are you worried about falling?  yes   2 or more falls in past year? no   Fall with injury in past year? no           7/18/2025     3:00 PM 3/3/2025    11:00 AM 10/1/2024    10:00 AM 3/13/2024    10:00 AM 8/11/2023     3:00 PM 6/5/2023     3:00 PM   ADL ASSESSMENT   Feeding yourself No Help Needed No Help Needed No Help Needed No Help Needed No Help

## 2025-07-18 NOTE — PROGRESS NOTES
Michele Muro is a 89 y.o. female  Chief Complaint   Patient presents with    Knee Pain     R knee pain x 1 week .. Painful when she goes to stand and at times it has given out on her      HPI:  R knee pain  No trauma. Gradually worsened over past week or so. Hurts to stand up ater sitting a while, feels like it will give way. Better if holds still for a min      PMH, SH, Medications/Allergies: reviewed, on chart.    ROS:  Constitutional: No fever, chills or abnormal weight loss  Respiratory: No cough, SOB   CV: No  Palpitations. Minimal pedal edema.    Reviewed PMH, PSH, SH, Medications, allergies (see chart).  Current Outpatient Medications   Medication Sig    amLODIPine (NORVASC) 5 MG tablet Take 1 tablet by mouth once daily for blood pressure    atorvastatin (LIPITOR) 10 MG tablet TAKE 1 TABLET BY MOUTH ONCE DAILY FOR  CHOLESTEROL  OR  STROKE    apixaban (ELIQUIS) 5 MG TABS tablet Take 0.5-1 tablets by mouth 2 times daily    losartan (COZAAR) 25 MG tablet Take 1 tablet by mouth daily Indications: heart and pressure    omeprazole (PRILOSEC) 20 MG delayed release capsule Take 1 capsule by mouth every morning (before breakfast) Indications: protect stomach from bleeding    acetaminophen (ACETAMINOPHEN 8 HOUR) 650 MG extended release tablet Take 1 tablet by mouth every 8 hours as needed for Pain    clotrimazole-betamethasone (LOTRISONE) 1-0.05 % cream Apply topically 2 times daily.    aspirin 81 MG EC tablet Take 1 tablet by mouth daily    vitamin D 25 MCG (1000 UT) CAPS Take 1 capsule by mouth daily    oxybutynin (DITROPAN-XL) 10 MG extended release tablet Take 1 tablet by mouth daily (Patient not taking: Reported on 7/18/2025)     No current facility-administered medications for this visit.     ROS:   General: No fever, chills, or abnormal weight loss  Respiratory: No cough, dyspnea  CV: No chest pain, palpitations    Objective:  Visit Vitals  /82 (BP Site: Right Upper Arm, Patient Position: Sitting)

## (undated) DEVICE — COVER,MAYO STAND,STERILE: Brand: MEDLINE

## (undated) DEVICE — SKIN MARKER,REGULAR TIP WITH RULER AND LABELS: Brand: DEVON

## (undated) DEVICE — TUBING IRRIG L96IN DIA0.241IN L BOR T-U-R W/ NVENT PIERCING

## (undated) DEVICE — SOLUTION SCRB 4OZ 10% PVP I POVIDONE IOD TOP PAINT EXIDINE

## (undated) DEVICE — ENDOSCOPIC VALVE WITH ADAPTER.: Brand: SURSEAL® II

## (undated) DEVICE — SOLUTION IRRIG 1000ML H2O STRL BLT

## (undated) DEVICE — JELLY,LUBE,STERILE,FLIP TOP,TUBE,4-OZ: Brand: MEDLINE

## (undated) DEVICE — Device

## (undated) DEVICE — GOWN,SIRUS,FABRNF,XL,20/CS: Brand: MEDLINE

## (undated) DEVICE — GUIDEWIRE URO L150CM DIA0.035IN STD NIT HYDRPHLC STR TIP

## (undated) DEVICE — SOLUTION IRRIGATION H2O 0797305] ICU MEDICAL INC]

## (undated) DEVICE — TOWEL SURG W17XL27IN STD BLU COT NONFENESTRATED PREWASHED

## (undated) DEVICE — GDWIRE URET STR STD .035X150 -- ZIPWIRE STD

## (undated) DEVICE — CYSTOSCOPY PACK: Brand: CONVERTORS

## (undated) DEVICE — SOLUTION IRRIG 3000ML 0.9% SOD CHL FLX CONT 0797208] ICU MEDICAL INC]

## (undated) DEVICE — GAUZE SPONGES,12 PLY: Brand: CURITY

## (undated) DEVICE — SYR LR LCK 1ML GRAD NSAF 30ML --

## (undated) DEVICE — BAG COLLECTION FLD OR-TBL NS --

## (undated) DEVICE — SYR 10ML LUER LOK 1/5ML GRAD --

## (undated) DEVICE — 4-PORT MANIFOLD: Brand: NEPTUNE 2

## (undated) DEVICE — DEVON™ KNEE AND BODY STRAP 60" X 3" (1.5 M X 7.6 CM): Brand: DEVON

## (undated) DEVICE — INFECTION CONTROL KIT SYS

## (undated) DEVICE — MEDI-VAC NON-CONDUCTIVE SUCTION TUBING: Brand: CARDINAL HEALTH

## (undated) DEVICE — STERILE POLYISOPRENE POWDER-FREE SURGICAL GLOVES: Brand: PROTEXIS

## (undated) DEVICE — 3000CC GUARDIAN II: Brand: GUARDIAN

## (undated) DEVICE — HANDLE LT SNAP ON ULT DURABLE LENS FOR TRUMPF ALC DISPOSABLE